# Patient Record
Sex: MALE | Race: BLACK OR AFRICAN AMERICAN | NOT HISPANIC OR LATINO | Employment: OTHER | URBAN - METROPOLITAN AREA
[De-identification: names, ages, dates, MRNs, and addresses within clinical notes are randomized per-mention and may not be internally consistent; named-entity substitution may affect disease eponyms.]

---

## 2019-03-07 ENCOUNTER — OFFICE VISIT (OUTPATIENT)
Dept: FAMILY MEDICINE CLINIC | Facility: CLINIC | Age: 64
End: 2019-03-07

## 2019-03-07 VITALS
OXYGEN SATURATION: 98 % | RESPIRATION RATE: 14 BRPM | HEART RATE: 86 BPM | HEIGHT: 72 IN | WEIGHT: 170 LBS | BODY MASS INDEX: 23.03 KG/M2

## 2019-03-07 DIAGNOSIS — Z12.11 SCREENING FOR COLON CANCER: ICD-10-CM

## 2019-03-07 DIAGNOSIS — R03.0 ELEVATED BLOOD-PRESSURE READING WITHOUT DIAGNOSIS OF HYPERTENSION: Primary | ICD-10-CM

## 2019-03-07 PROBLEM — F17.210 CIGARETTE NICOTINE DEPENDENCE WITHOUT COMPLICATION: Status: ACTIVE | Noted: 2019-03-07

## 2019-03-07 PROCEDURE — 99203 OFFICE O/P NEW LOW 30 MIN: CPT | Performed by: FAMILY MEDICINE

## 2019-03-07 RX ORDER — AMLODIPINE BESYLATE 5 MG/1
5 TABLET ORAL DAILY
Qty: 30 TABLET | Refills: 1 | Status: SHIPPED | OUTPATIENT
Start: 2019-03-07 | End: 2020-10-23

## 2019-03-07 NOTE — PATIENT INSTRUCTIONS
Monitor BP at home  Goal < 130/80- If you remain up, consider medication  Advise decrease smoking and caffeine intake  Bring recent lab results here  You should have colonoscopy  Recheck BP here 2-3 months  If BP remains up ,start RX for amlodipine 5mg daily

## 2019-03-07 NOTE — PROGRESS NOTES
[id number]         NAME: Charla Bamberger is a 58 y o  male  : 1955    MRN: [id number]  DATE: 2019  TIME: 12:28 PM    Assessment and Plan   Elevated blood-pressure reading without diagnosis of hypertension [R03 0]  1  Elevated blood-pressure reading without diagnosis of hypertension  amLODIPine (NORVASC) 5 mg tablet   2  Screening for colon cancer  Ambulatory referral to Gastroenterology       No problem-specific Assessment & Plan notes found for this encounter  Patient Instructions     Patient Instructions   Monitor BP at home  Goal < 130/80- If you remain up, consider medication  Advise decrease smoking and caffeine intake  Bring recent lab results here  You should have colonoscopy  Recheck BP here 2-3 months  If BP remains up ,start RX for amlodipine 5mg daily  Chief Complaint     Chief Complaint   Patient presents with    PT Initial Evaluation     new pt est care - bp readings         History of Present Illness       Pt here to establish care  Had life insurance exam with borderline high blood pressure  Average of 3 readings was 140/90  No h/o hypertension  No current medications  Arthritis of kness and wrists- Aleve 3/day  Review of Systems   Review of Systems   Constitutional: Negative for activity change, appetite change, diaphoresis and fatigue  Respiratory: Negative for cough, chest tightness, shortness of breath and wheezing  Cardiovascular: Negative for chest pain, palpitations and leg swelling  Fast or slow heart rate   Gastrointestinal: Negative for abdominal pain, blood in stool, constipation, diarrhea, nausea and vomiting  Genitourinary: Negative for difficulty urinating, dysuria and frequency  Musculoskeletal: Positive for arthralgias  Negative for gait problem, joint swelling and myalgias  Neurological: Negative for dizziness, light-headedness and headaches     Psychiatric/Behavioral: Negative for agitation, confusion, dysphoric mood and sleep disturbance  The patient is not nervous/anxious  Current Medications       Current Outpatient Medications:     amLODIPine (NORVASC) 5 mg tablet, Take 1 tablet (5 mg total) by mouth daily, Disp: 30 tablet, Rfl: 1    Current Allergies     Allergies as of 03/07/2019    (No Known Allergies)            The following portions of the patient's history were reviewed and updated as appropriate: allergies, current medications, past family history, past medical history, past social history, past surgical history and problem list      Past Medical History:   Diagnosis Date    Arthritis     both knees       Past Surgical History:   Procedure Laterality Date    CARPAL TUNNEL RELEASE  2015    Both wrists       Family History   Problem Relation Age of Onset    Breast cancer Mother     Substance Abuse Neg Hx     Mental illness Neg Hx          Medications have been verified  Objective   Pulse 86   Resp 14   Ht 6' (1 829 m)   Wt 77 1 kg (170 lb)   SpO2 98%   BMI 23 06 kg/m²        Physical Exam     Physical Exam   Constitutional: He is oriented to person, place, and time  He appears well-developed and well-nourished  No distress  HENT:   Head: Normocephalic and atraumatic  Right Ear: Tympanic membrane, external ear and ear canal normal    Left Ear: Tympanic membrane, external ear and ear canal normal    Nose: No mucosal edema or rhinorrhea  Right sinus exhibits no maxillary sinus tenderness  Left sinus exhibits no maxillary sinus tenderness  Mouth/Throat: Uvula is midline  Mucous membranes are not pale and not dry  Normal dentition  No oropharyngeal exudate  Eyes: Pupils are equal, round, and reactive to light  EOM are normal  Right eye exhibits no discharge  Left eye exhibits no discharge  Neck: Normal range of motion  Neck supple  No thyromegaly present  Cardiovascular: Normal rate, regular rhythm, normal heart sounds and intact distal pulses  No murmur heard  Pulmonary/Chest: Effort normal and breath sounds normal  No respiratory distress  He has no wheezes  He has no rales  Abdominal: Soft  He exhibits no mass  There is no tenderness  There is no rebound and no guarding  Musculoskeletal: Normal range of motion  He exhibits no edema or tenderness  Lymphadenopathy:     He has no cervical adenopathy  Neurological: He is alert and oriented to person, place, and time  No cranial nerve deficit  Skin: He is not diaphoretic  Psychiatric: He has a normal mood and affect   His behavior is normal

## 2020-10-23 ENCOUNTER — OFFICE VISIT (OUTPATIENT)
Dept: FAMILY MEDICINE CLINIC | Facility: CLINIC | Age: 65
End: 2020-10-23
Payer: MEDICARE

## 2020-10-23 VITALS
HEIGHT: 71 IN | HEART RATE: 71 BPM | OXYGEN SATURATION: 99 % | TEMPERATURE: 97.8 F | SYSTOLIC BLOOD PRESSURE: 140 MMHG | DIASTOLIC BLOOD PRESSURE: 100 MMHG | WEIGHT: 183.8 LBS | RESPIRATION RATE: 26 BRPM | BODY MASS INDEX: 25.73 KG/M2

## 2020-10-23 DIAGNOSIS — Z23 NEED FOR INFLUENZA VACCINATION: ICD-10-CM

## 2020-10-23 DIAGNOSIS — Z13.6 SCREENING FOR AAA (ABDOMINAL AORTIC ANEURYSM): ICD-10-CM

## 2020-10-23 DIAGNOSIS — Z00.00 WELCOME TO MEDICARE PREVENTIVE VISIT: Primary | ICD-10-CM

## 2020-10-23 DIAGNOSIS — Z13.6 SCREENING FOR CARDIOVASCULAR CONDITION: ICD-10-CM

## 2020-10-23 DIAGNOSIS — I10 ESSENTIAL HYPERTENSION: ICD-10-CM

## 2020-10-23 DIAGNOSIS — Z12.5 ENCOUNTER FOR PROSTATE CANCER SCREENING: ICD-10-CM

## 2020-10-23 DIAGNOSIS — Z12.2 ENCOUNTER FOR SCREENING FOR LUNG CANCER: ICD-10-CM

## 2020-10-23 DIAGNOSIS — Z87.891 PERSONAL HISTORY OF NICOTINE DEPENDENCE: ICD-10-CM

## 2020-10-23 DIAGNOSIS — Z23 NEED FOR PNEUMOCOCCAL VACCINATION: ICD-10-CM

## 2020-10-23 PROCEDURE — G0008 ADMIN INFLUENZA VIRUS VAC: HCPCS

## 2020-10-23 PROCEDURE — 96372 THER/PROPH/DIAG INJ SC/IM: CPT | Performed by: FAMILY MEDICINE

## 2020-10-23 PROCEDURE — 90662 IIV NO PRSV INCREASED AG IM: CPT

## 2020-10-23 PROCEDURE — 90670 PCV13 VACCINE IM: CPT

## 2020-10-23 PROCEDURE — G0438 PPPS, INITIAL VISIT: HCPCS | Performed by: FAMILY MEDICINE

## 2020-10-23 PROCEDURE — G0009 ADMIN PNEUMOCOCCAL VACCINE: HCPCS

## 2020-10-23 RX ORDER — AMLODIPINE BESYLATE 5 MG/1
5 TABLET ORAL DAILY
Qty: 90 TABLET | Refills: 1 | Status: SHIPPED | OUTPATIENT
Start: 2020-10-23 | End: 2021-06-22 | Stop reason: SDUPTHER

## 2020-12-09 LAB
ALBUMIN SERPL-MCNC: 4.1 G/DL (ref 3.6–5.1)
ALBUMIN/GLOB SERPL: 1.5 (CALC) (ref 1–2.5)
ALP SERPL-CCNC: 47 U/L (ref 35–144)
ALT SERPL-CCNC: 10 U/L (ref 9–46)
AST SERPL-CCNC: 17 U/L (ref 10–35)
BILIRUB SERPL-MCNC: 0.6 MG/DL (ref 0.2–1.2)
BUN SERPL-MCNC: 9 MG/DL (ref 7–25)
BUN/CREAT SERPL: NORMAL (CALC) (ref 6–22)
CALCIUM SERPL-MCNC: 9.5 MG/DL (ref 8.6–10.3)
CHLORIDE SERPL-SCNC: 104 MMOL/L (ref 98–110)
CHOLEST SERPL-MCNC: 188 MG/DL
CHOLEST/HDLC SERPL: 4.3 (CALC)
CO2 SERPL-SCNC: 27 MMOL/L (ref 20–32)
CREAT SERPL-MCNC: 1.13 MG/DL (ref 0.7–1.25)
GLOBULIN SER CALC-MCNC: 2.8 G/DL (CALC) (ref 1.9–3.7)
GLUCOSE SERPL-MCNC: 99 MG/DL (ref 65–99)
HDLC SERPL-MCNC: 44 MG/DL
LDLC SERPL CALC-MCNC: 124 MG/DL (CALC)
NONHDLC SERPL-MCNC: 144 MG/DL (CALC)
POTASSIUM SERPL-SCNC: 4.3 MMOL/L (ref 3.5–5.3)
PROT SERPL-MCNC: 6.9 G/DL (ref 6.1–8.1)
PSA SERPL-MCNC: 8 NG/ML
SL AMB EGFR AFRICAN AMERICAN: 78 ML/MIN/1.73M2
SL AMB EGFR NON AFRICAN AMERICAN: 67 ML/MIN/1.73M2
SODIUM SERPL-SCNC: 138 MMOL/L (ref 135–146)
TRIGL SERPL-MCNC: 101 MG/DL

## 2020-12-10 ENCOUNTER — TELEPHONE (OUTPATIENT)
Dept: FAMILY MEDICINE CLINIC | Facility: CLINIC | Age: 65
End: 2020-12-10

## 2021-01-05 ENCOUNTER — TELEPHONE (OUTPATIENT)
Dept: UROLOGY | Facility: MEDICAL CENTER | Age: 66
End: 2021-01-05

## 2021-01-05 NOTE — TELEPHONE ENCOUNTER
Please Triage - [address] Patient- Ref by pcp Dr Karina Kaufman office   [phone]  What is the reason for the patients appointment? Elevated psa 8 0       Imaging/Lab Results: Psa, total screen (12/8/20)      Do we accept the patient's insurance or is the patient Self-Pay?   Provider & Plan: MEDICARE A AND B  Member ID#: [id number]     Has the patient had any previous urologist(s)? no       Have patient records been requested? epic       Has the patient had any outside testing done? epic      Does the patient have a personal history of cancer? no      Patient can be reached at : [phone]

## 2021-02-18 ENCOUNTER — TELEMEDICINE (OUTPATIENT)
Dept: UROLOGY | Facility: HOSPITAL | Age: 66
End: 2021-02-18
Payer: MEDICARE

## 2021-02-18 DIAGNOSIS — Z12.5 ENCOUNTER FOR SCREENING FOR MALIGNANT NEOPLASM OF PROSTATE: ICD-10-CM

## 2021-02-18 DIAGNOSIS — R97.20 ELEVATED PSA: Primary | ICD-10-CM

## 2021-02-18 PROCEDURE — 99213 OFFICE O/P EST LOW 20 MIN: CPT | Performed by: NURSE PRACTITIONER

## 2021-02-18 NOTE — PROGRESS NOTES
Virtual Regular Visit      Assessment/Plan:    Problem List Items Addressed This Visit        Other    Elevated PSA - Primary     We reviewed the results of his recent PSA from 12/8/2020 which was 8 0  No prior PSAs available in Epic to review  We discussed causes for false elevation  Patient believes he engaged in sexual activity before PSA  We will repeat his PSA and have him return to the office to perform digital rectal examination  If PSA remains elevated, we will proceed with prostate biopsy  Relevant Orders    PSA, Total Screen      Other Visit Diagnoses     Encounter for screening for malignant neoplasm of prostate         Relevant Orders    PSA, Total Screen      Follow up in 4 weeks with PSA and YOSHI         Reason for visit is   Chief Complaint   Patient presents with    Virtual Regular Visit        Encounter provider Ubaldo Lambert, [facility]    Provider located at [place] [address] [address] [place] [address]      Recent Visits  No visits were found meeting these conditions  Showing recent visits within past 7 days and meeting all other requirements     Today's Visits  Date Type Provider Dept   [facility]   Showing today's visits and meeting all other requirements     Future Appointments  No visits were found meeting these conditions  Showing future appointments within next 150 days and meeting all other requirements        The patient was identified by name and date of birth  Citlali Moeller was informed that this is a telemedicine visit and that the visit is being conducted through [employer] and patient was informed that this is not a secure, HIPAA-compliant platform  He agrees to proceed     My office door was closed  No one else was in the room  He acknowledged consent and understanding of privacy and security of the video platform   The patient has agreed to participate and understands they can discontinue the visit at any time  Patient is aware this is a billable service  Mely Gonzalez is a 76 y o  male who is being evaluated in consultation for elevated PSA  Patient referred by his PCP  He was recently found to have PSA of 8 0 on annual physical examination in December  Patient denies any prior urologic history, surgical intervention, or instrumentation  He denies any lower urinary tract symptoms, gross hematuria, or dysuria  Patient also denies any strong family history of prostate cancer  HPI     Past Medical History:   Diagnosis Date    Arthritis     both knees       Past Surgical History:   Procedure Laterality Date    CARPAL TUNNEL RELEASE  2015    Both wrists    TONSILLECTOMY         Current Outpatient Medications   Medication Sig Dispense Refill    amLODIPine (NORVASC) 5 mg tablet Take 1 tablet (5 mg total) by mouth daily 90 tablet 1     No current facility-administered medications for this visit  No Known Allergies    Review of Systems   Constitutional: Negative  HENT: Negative  Respiratory: Negative  Cardiovascular: Negative  Gastrointestinal: Negative  Genitourinary: Negative for decreased urine volume, difficulty urinating, dysuria, flank pain, frequency, hematuria and urgency  Musculoskeletal: Negative  Skin: Negative  Neurological: Negative  Psychiatric/Behavioral: Negative  Video Exam    There were no vitals filed for this visit  Physical Exam  Constitutional:       Appearance: Normal appearance  Neurological:      General: No focal deficit present  Mental Status: He is alert and oriented to person, place, and time  Psychiatric:         Mood and Affect: Mood normal          Behavior: Behavior normal          Thought Content:  Thought content normal          Judgment: Judgment normal           I spent 15 minutes with patient today in which greater than 50% of the time was spent in counseling/coordination of care regarding plan of care      Sarita 33 acknowledges that he has consented to an online visit or consultation  He understands that the online visit is based solely on information provided by him, and that, in the absence of a face-to-face physical evaluation by the physician, the diagnosis he receives is both limited and provisional in terms of accuracy and completeness  This is not intended to replace a full medical face-to-face evaluation by the physician  THE [facility] understands and accepts these terms

## 2021-02-18 NOTE — ASSESSMENT & PLAN NOTE
We reviewed the results of his recent PSA from 12/8/2020 which was 8 0  No prior PSAs available in Epic to review  We discussed causes for false elevation  Patient believes he engaged in sexual activity before PSA  We will repeat his PSA and have him return to the office to perform digital rectal examination  If PSA remains elevated, we will proceed with prostate biopsy

## 2021-03-01 ENCOUNTER — TELEPHONE (OUTPATIENT)
Dept: UROLOGY | Facility: AMBULATORY SURGERY CENTER | Age: 66
End: 2021-03-01

## 2021-03-01 DIAGNOSIS — R97.20 ELEVATED PSA: Primary | ICD-10-CM

## 2021-03-01 NOTE — TELEPHONE ENCOUNTER
WORKING ON WAITLIST, WHEN PT CALLS BACK ISAÍAS Hager 3-4 weeks follow up with PSA PTV   1ST ATTEMPT TO CONTACT PT ON 03/01/21 @ 2:01P OLLIE TO CALL THE OFFICE TO SCHED AN APPT

## 2021-03-04 DIAGNOSIS — Z23 ENCOUNTER FOR IMMUNIZATION: ICD-10-CM

## 2021-03-08 NOTE — TELEPHONE ENCOUNTER
Patient's wife called to say he went to [facility] for labs but did not have script   Faxed script to [phone]  One he gets his psa will call back

## 2021-03-13 LAB — PSA SERPL-MCNC: 10.7 NG/ML

## 2021-03-15 ENCOUNTER — TELEPHONE (OUTPATIENT)
Dept: UROLOGY | Facility: AMBULATORY SURGERY CENTER | Age: 66
End: 2021-03-15

## 2021-03-15 RX ORDER — CIPROFLOXACIN 500 MG/1
500 TABLET, FILM COATED ORAL EVERY 12 HOURS SCHEDULED
Qty: 2 TABLET | Refills: 0 | Status: SHIPPED | OUTPATIENT
Start: 2021-03-15 | End: 2021-03-16

## 2021-03-15 NOTE — TELEPHONE ENCOUNTER
[facility] and his wife Laurence Stephenson the a prostate biopsy is needed  Schedule him with Dr Stella Wright in [place] for biopsy and Robert franks for follow up  Reivewed the prostate biopsy with them and mailed information along with appointment cards   Once they receive information - they will call if they have questions

## 2021-03-15 NOTE — TELEPHONE ENCOUNTER
Results of recent PSA remain elevated  His PSA is now 10  Patient needs to be scheduled for next available prostate biopsy  Please review biopsy preparation instructions  Prescription for Cipro was electronically sent to his pharmacy

## 2021-03-25 ENCOUNTER — IMMUNIZATIONS (OUTPATIENT)
Dept: FAMILY MEDICINE CLINIC | Facility: HOSPITAL | Age: 66
End: 2021-03-25

## 2021-03-25 DIAGNOSIS — Z23 ENCOUNTER FOR IMMUNIZATION: Primary | ICD-10-CM

## 2021-03-25 PROCEDURE — 91300 SARS-COV-2 / COVID-19 MRNA VACCINE (PFIZER-BIONTECH) 30 MCG: CPT

## 2021-03-25 PROCEDURE — 0001A SARS-COV-2 / COVID-19 MRNA VACCINE (PFIZER-BIONTECH) 30 MCG: CPT

## 2021-04-15 ENCOUNTER — IMMUNIZATIONS (OUTPATIENT)
Dept: FAMILY MEDICINE CLINIC | Facility: HOSPITAL | Age: 66
End: 2021-04-15

## 2021-04-15 DIAGNOSIS — Z23 ENCOUNTER FOR IMMUNIZATION: Primary | ICD-10-CM

## 2021-04-15 PROCEDURE — 91300 SARS-COV-2 / COVID-19 MRNA VACCINE (PFIZER-BIONTECH) 30 MCG: CPT

## 2021-04-15 PROCEDURE — 0002A SARS-COV-2 / COVID-19 MRNA VACCINE (PFIZER-BIONTECH) 30 MCG: CPT

## 2021-04-21 ENCOUNTER — TELEPHONE (OUTPATIENT)
Dept: UROLOGY | Facility: MEDICAL CENTER | Age: 66
End: 2021-04-21

## 2021-04-21 DIAGNOSIS — R97.20 ELEVATED PSA: Primary | ICD-10-CM

## 2021-04-21 RX ORDER — CIPROFLOXACIN 750 MG/1
750 TABLET, FILM COATED ORAL EVERY 12 HOURS SCHEDULED
Qty: 2 TABLET | Refills: 0 | Status: SHIPPED | OUTPATIENT
Start: 2021-04-21 | End: 2021-04-22

## 2021-04-21 NOTE — TELEPHONE ENCOUNTER
Patient of Dr Carolyne Velasco    Patient wife calling to get antibiotics called into pharmacy before procedure on 04/28

## 2021-04-28 ENCOUNTER — PROCEDURE VISIT (OUTPATIENT)
Dept: UROLOGY | Facility: MEDICAL CENTER | Age: 66
End: 2021-04-28
Payer: MEDICARE

## 2021-04-28 VITALS
HEIGHT: 72 IN | BODY MASS INDEX: 24.24 KG/M2 | SYSTOLIC BLOOD PRESSURE: 126 MMHG | WEIGHT: 179 LBS | DIASTOLIC BLOOD PRESSURE: 96 MMHG

## 2021-04-28 DIAGNOSIS — R97.20 ELEVATED PSA: ICD-10-CM

## 2021-04-28 DIAGNOSIS — Z12.11 ENCOUNTER FOR SCREENING COLONOSCOPY: Primary | ICD-10-CM

## 2021-04-28 PROCEDURE — 96372 THER/PROPH/DIAG INJ SC/IM: CPT | Performed by: UROLOGY

## 2021-04-28 PROCEDURE — G0416 PROSTATE BIOPSY, ANY MTHD: HCPCS | Performed by: PATHOLOGY

## 2021-04-28 PROCEDURE — 88344 IMHCHEM/IMCYTCHM EA MLT ANTB: CPT | Performed by: PATHOLOGY

## 2021-04-28 PROCEDURE — 76942 ECHO GUIDE FOR BIOPSY: CPT | Performed by: UROLOGY

## 2021-04-28 PROCEDURE — 55700 PR BIOPSY OF PROSTATE,NEEDLE/PUNCH: CPT | Performed by: UROLOGY

## 2021-04-28 RX ORDER — CEFTRIAXONE 1 G/1
1000 INJECTION, POWDER, FOR SOLUTION INTRAMUSCULAR; INTRAVENOUS ONCE
Status: COMPLETED | OUTPATIENT
Start: 2021-04-28 | End: 2021-04-28

## 2021-04-28 RX ADMIN — CEFTRIAXONE 1000 MG: 1 INJECTION, POWDER, FOR SOLUTION INTRAMUSCULAR; INTRAVENOUS at 13:57

## 2021-04-28 NOTE — PROGRESS NOTES
Biopsy prostate     Date/Time 4/28/2021 4:44 PM     Performed by  Michaela Peña MD     Authorized by Michaela Peña MD      Procedure Details   Procedure Notes:   Preop diagnosis:  Elevated PSA    Prostate YOSHI abnormality       Postop diagnosis same    Procedure:  Ultrasound-guided transrectal needle biopsy prostate       The patient had undergone preoperative preparation with Fleet enema and antibiotics as described  Informed consent had been obtained  He was positioned in left lateral decubitus position  Digital exam showed moderate induration right lateral lobe at the edge, 1 cm  The ultrasound probe was gently inserted in the rectum  Ultrasound images of the prostate were obtained in two different planes  1% xylocaine, 5 mL on each side, was used to anesthetize the nerves on each side of the prostate  Prostate volume was measured at  51 mL  Careful examination for any abnormalities showed non  Twelve cores of tissue were taken with the 18 gauge biopsy needle, in the appropriate zones of the prostate  Minimal bleeding was encountered upon removal of the probe  No significant bleeding from the urethra was seen  After appropriate observation, he was accompanied to the bathroom, having tolerated the procedure well

## 2021-05-05 ENCOUNTER — TELEPHONE (OUTPATIENT)
Dept: UROLOGY | Facility: MEDICAL CENTER | Age: 66
End: 2021-05-05

## 2021-05-05 DIAGNOSIS — C61 PROSTATE CANCER (HCC): Primary | ICD-10-CM

## 2021-05-05 NOTE — TELEPHONE ENCOUNTER
LMOM for patient to call back  Patient needs to be scheduled for NM bone scan/CT abd/pelvis  BMP ordered

## 2021-05-05 NOTE — PROGRESS NOTES
I discussed results of prostate biopsy showing cancer, [place] seven in numerous cores on right and left side        Will order CT and bone scan, then office visit to discuss option for treatment

## 2021-05-06 ENCOUNTER — TELEPHONE (OUTPATIENT)
Dept: UROLOGY | Facility: MEDICAL CENTER | Age: 66
End: 2021-05-06

## 2021-05-06 NOTE — TELEPHONE ENCOUNTER
Called pt re scheduling CT and bone scan  Left message to call us back re scheduling the testing  Called his cell phone and left message

## 2021-05-10 ENCOUNTER — TELEPHONE (OUTPATIENT)
Dept: UROLOGY | Facility: MEDICAL CENTER | Age: 66
End: 2021-05-10

## 2021-05-10 NOTE — TELEPHONE ENCOUNTER
Called and left message on patient's home phone [phone]  Left message for patient to call to schedule his tests, gave [phone] Central Scheduling #, or to call here if he has any questions

## 2021-05-12 ENCOUNTER — HOSPITAL ENCOUNTER (OUTPATIENT)
Dept: NUCLEAR MEDICINE | Facility: HOSPITAL | Age: 66
Discharge: HOME | End: 2021-05-12
Attending: UROLOGY
Payer: MEDICARE

## 2021-05-12 ENCOUNTER — LAB (OUTPATIENT)
Dept: LAB | Facility: HOSPITAL | Age: 66
End: 2021-05-12
Attending: UROLOGY
Payer: MEDICARE

## 2021-05-12 DIAGNOSIS — C61 PROSTATE CANCER (HCC): ICD-10-CM

## 2021-05-12 LAB
ANION GAP SERPL CALCULATED.3IONS-SCNC: 10 MMOL/L (ref 4–13)
BUN SERPL-MCNC: 9 MG/DL (ref 5–25)
CALCIUM SERPL-MCNC: 9.4 MG/DL (ref 8.3–10.1)
CHLORIDE SERPL-SCNC: 108 MMOL/L (ref 100–108)
CO2 SERPL-SCNC: 23 MMOL/L (ref 21–32)
CREAT SERPL-MCNC: 1.05 MG/DL (ref 0.6–1.3)
GFR SERPL CREATININE-BSD FRML MDRD: 85 ML/MIN/1.73SQ M
GLUCOSE P FAST SERPL-MCNC: 108 MG/DL (ref 65–99)
POTASSIUM SERPL-SCNC: 3.6 MMOL/L (ref 3.5–5.3)
SODIUM SERPL-SCNC: 141 MMOL/L (ref 136–145)

## 2021-05-12 PROCEDURE — 80048 BASIC METABOLIC PNL TOTAL CA: CPT

## 2021-05-12 PROCEDURE — 78306 BONE IMAGING WHOLE BODY: CPT

## 2021-05-12 PROCEDURE — 36415 COLL VENOUS BLD VENIPUNCTURE: CPT

## 2021-05-12 PROCEDURE — A9503 TC99M MEDRONATE: HCPCS

## 2021-05-12 PROCEDURE — G1004 CDSM NDSC: HCPCS

## 2021-05-14 ENCOUNTER — HOSPITAL ENCOUNTER (OUTPATIENT)
Dept: CT IMAGING | Facility: HOSPITAL | Age: 66
Discharge: HOME | End: 2021-05-14
Attending: UROLOGY
Payer: MEDICARE

## 2021-05-14 DIAGNOSIS — C61 PROSTATE CANCER (HCC): ICD-10-CM

## 2021-05-14 PROCEDURE — G1004 CDSM NDSC: HCPCS

## 2021-05-14 PROCEDURE — 74177 CT ABD & PELVIS W/CONTRAST: CPT

## 2021-05-14 RX ADMIN — IOHEXOL 100 ML: 350 INJECTION, SOLUTION INTRAVENOUS at 19:23

## 2021-05-20 ENCOUNTER — TELEPHONE (OUTPATIENT)
Dept: UROLOGY | Facility: MEDICAL CENTER | Age: 66
End: 2021-05-20

## 2021-05-21 ENCOUNTER — OFFICE VISIT (OUTPATIENT)
Dept: UROLOGY | Facility: HOSPITAL | Age: 66
End: 2021-05-21
Payer: MEDICARE

## 2021-05-21 VITALS
SYSTOLIC BLOOD PRESSURE: 112 MMHG | HEART RATE: 106 BPM | BODY MASS INDEX: 24.24 KG/M2 | WEIGHT: 179 LBS | DIASTOLIC BLOOD PRESSURE: 72 MMHG | HEIGHT: 72 IN

## 2021-05-21 DIAGNOSIS — C61 PROSTATE CANCER (HCC): Primary | ICD-10-CM

## 2021-05-21 PROCEDURE — 99214 OFFICE O/P EST MOD 30 MIN: CPT | Performed by: UROLOGY

## 2021-05-21 NOTE — PATIENT INSTRUCTIONS
Call Radiation Oncology [phone]    Ask for consult with Dr Figueroa Severino new diagnosis prostate cancer

## 2021-05-21 NOTE — PROGRESS NOTES
HISTORY:    Recent diagnosis prostate cancer, Rachel score seven in several cores  Summer or 4+3, others were 3+4  Bone scan and CT negative  Minimal BPH symptoms         ASSESSMENT / PLAN:    Long talk regarding options    Carotid radical prostatectomy, robotic technique, catheter and potential complications incontinence impotence    Radiation therapy, length of treatment, potential complications, extensive set up and lead up procedures to the radiation  Patient will likely need hormone therapy based on his pathology    After much discussion, patient says he wants radiation  He does not want any risk of injury to erections, I told him the rad has a lower, but still definite possibility of injury to nerve for erection  Consult arranged    The following portions of the patient's history were reviewed and updated as appropriate: allergies, current medications, past family history, past medical history, past social history, past surgical history and problem list     Review of Systems   All other systems reviewed and are negative  Objective:     Physical Exam  Constitutional:       General: He is not in acute distress  Appearance: He is well-developed  He is not diaphoretic  HENT:      Head: Normocephalic and atraumatic  Eyes:      General: No scleral icterus  Pulmonary:      Effort: Pulmonary effort is normal    Skin:     Coloration: Skin is not pale  Neurological:      Mental Status: He is alert and oriented to person, place, and time  Psychiatric:         Behavior: Behavior normal          Thought Content:  Thought content normal          Judgment: Judgment normal            0   Lab Value Date/Time    PSA 10 7 (H) 03/12/2021 0851    PSA 8 0 (H) 12/08/2020 0905   ]  BUN   Date Value Ref Range Status   05/12/2021 9 5 - 25 mg/dL Final   12/08/2020 9 7 - 25 mg/dL Final     Creatinine   Date Value Ref Range Status   05/12/2021 1 05 0 60 - 1 30 mg/dL Final     Comment:     Standardized to IDMS reference method   05/14/2014 0 94 0 60 - 1 30 mg/dL Final     Comment:     Standardized to IDMS reference method     No components found for: CBC      Patient Active Problem List   Diagnosis    Essential hypertension    Cigarette nicotine dependence without complication    Elevated PSA    Prostate cancer ([facility] [address] )        Diagnoses and all orders for this visit:    Prostate cancer [facility])  -     Ambulatory referral to Radiation Oncology; Future           Patient ID: Moni Munguia is a 76 y o  male        Current Outpatient Medications:     amLODIPine (NORVASC) 5 mg tablet, Take 1 tablet (5 mg total) by mouth daily, Disp: 90 tablet, Rfl: 1    Past Medical History:   Diagnosis Date    Arthritis     both knees       Past Surgical History:   Procedure Laterality Date    CARPAL TUNNEL RELEASE  2015    Both wrists    TONSILLECTOMY         Social History

## 2021-06-03 ENCOUNTER — RADIATION ONCOLOGY CONSULT (OUTPATIENT)
Dept: RADIATION ONCOLOGY | Facility: CLINIC | Age: 66
End: 2021-06-03
Attending: RADIOLOGY
Payer: MEDICARE

## 2021-06-03 ENCOUNTER — TELEPHONE (OUTPATIENT)
Dept: RADIATION ONCOLOGY | Facility: CLINIC | Age: 66
End: 2021-06-03

## 2021-06-03 ENCOUNTER — CLINICAL SUPPORT (OUTPATIENT)
Dept: RADIATION ONCOLOGY | Facility: CLINIC | Age: 66
End: 2021-06-03
Attending: RADIOLOGY

## 2021-06-03 VITALS
HEIGHT: 72 IN | BODY MASS INDEX: 23.83 KG/M2 | WEIGHT: 175.93 LBS | OXYGEN SATURATION: 98 % | DIASTOLIC BLOOD PRESSURE: 90 MMHG | RESPIRATION RATE: 18 BRPM | HEART RATE: 96 BPM | TEMPERATURE: 97.4 F | SYSTOLIC BLOOD PRESSURE: 114 MMHG

## 2021-06-03 DIAGNOSIS — C61 PROSTATE CANCER (HCC): Primary | ICD-10-CM

## 2021-06-03 PROCEDURE — 99211 OFF/OP EST MAY X REQ PHY/QHP: CPT | Performed by: RADIOLOGY

## 2021-06-03 NOTE — PROGRESS NOTES
Radha Foley 1955 is a 76 y o  male with newly diagnosed Rachel 4+3=7 prostate cancer  He presents today for radiation oncology consult  PSA's  3/12/21   10 7  12/8/20   8 0    2/18/21 Christina Morgan NP virtual visit- seen in consult for elevated PSA of 8  Denies lower urinary tract symptoms  He believes he had sexual activity before PSA  Repeat PSA and have him return to office for YOSHI    4/28/21 prostate biopsy    5/12/21 bone scan- 1  Heterogeneous activity in the lumbar spine may be degenerative  Correlation with radiographs recommended  2   Otherwise no scintigraphic evidence of osseous metastasis  5/14/21 CT abdomen pelvis- No evidence of metastatic disease throughout the lung bases, abdomen or pelvis  5/21/21 Dr Akosua Olmos- [place] 7 in several cores, some 4+3 others 3+4  Imaging negative  Minimal BPH symptoms  Discussed surgery and radiation  Patient will likely need hormone therapy based on his pathology  He says he wants radiation  He does not want any risk of injury to erections, I told him radiation has a lower, but still definite possibility of injury to nerve for erection  Oncology History   Prostate cancer ([facility] [address] )   4/28/2021 Initial Diagnosis    Prostate cancer ([facility] [address] )     4/28/2021 Biopsy    Final Diagnosis   A  Prostate, right lateral base:  - Prostatic adenocarcinoma, [place] score 3 + 4 = 7, Prognostic Grade Group 2, discontinuously involving 7% of one core:  - see Note  * tumor is 10% grade 4   * periprostatic fat invasion:  not identified  * lymph-vascular invasion:  not identified  * perineural invasion:  not identified   - Additional pathologic findings:  N/A     B  Prostate, right lateral mid:  - Atypical small acinar proliferation in a single focus, < 5% of one core, suspicious for low grade prostatic adenocarcinoma - see Note       C  Prostate, right lateral apex x 2:  - Prostatic adenocarcinoma, [place] score 4 + 3 = 7, Prognostic Grade Group 3, discontinuously involving 23% of one of two cores:  - see Note  * tumor is 60% grade 4, with focal cribriform morphology   * periprostatic fat invasion:  not identified  * lymph-vascular invasion:  not identified  * perineural invasion:  not identified   - Additional pathologic findings:  N/A     D  Prostate, left lateral base:  - Prostatic adenocarcinoma, Rachel score 4 + 3 = 7, Prognostic Grade Group 3, discontinuously involving 35% of one core:  - see Note  * tumor is 70% grade 4, with focal cribriform morphology   * periprostatic fat invasion:  not identified  * lymph-vascular invasion:  not identified  * perineural invasion:  not identified   - Additional pathologic findings:  N/A     Note: Block D1 is suggested if additional studies are indicated (at least 0 5 mm of tumor for Prolaris)      E  Prostate, left lateral mid:  - Atypical small acinar proliferation in a single focus, < 5% of one core, suspicious for low grade prostatic adenocarcinoma - see Note  F  Prostate, left lateral apex:  - Benign prostate tissue  G  Prostate, left base:  - Prostatic adenocarcinoma, [place] score 3 + 4 = 7, Prognostic Grade Group 2, continuously involving 5% of one core:   * tumor is 20% grade 4   * periprostatic fat invasion:  not identified  * lymph-vascular invasion:  not identified  * perineural invasion:  focally present  - Additional pathologic findings:  N/A     H  Prostate, left mid:  - High grade prostatic intraepithelial neoplasia - see Note        I  Prostate, left apex:  - Benign prostate tissue  J  Prostate, right base:  - Benign prostate tissue  K  Prostate, right mid:  - Prostatic adenocarcinoma, Rachel score 4 + 3 = 7, Prognostic Grade Group 3, continuously involving 19% of one core:   * tumor is 90% grade 4 and displays focal cribriform morphology   * periprostatic fat invasion:  not identified  * lymph-vascular invasion:  not identified  * perineural invasion:  not identified    - Additional pathologic findings:  N/A     L  Prostate, right apex:  - Prostatic adenocarcinoma, [place] score 3 + 4 = 7, Prognostic Grade Group 2, discontinuously involving 19% of one core - see Note  * tumor is 40% grade 4  * periprostatic fat invasion:  not identified  * lymph-vascular invasion:  not identified     * perineural invasion:  focally present  - Additional pathologic findings:  N/A            Clinical Trial: no      Health Maintenance   Topic Date Due    Hepatitis C Screening  Never done    DTaP,Tdap,and Td Vaccines (1 - Tdap) Never done    Lung Cancer Screening  Never done    Colorectal Cancer Screening  Never done    Depression Screening PHQ  10/23/2021    Fall Risk  10/23/2021    Medicare Annual Wellness Visit (AWV)  10/23/2021    Pneumococcal Vaccine: 65+ Years (2 of 2 - PPSV23) 10/23/2021    BMI: Adult  05/21/2022    Influenza Vaccine  Completed    COVID-19 Vaccine  Completed    HIB Vaccine  Aged Out    Hepatitis B Vaccine  Aged Out    IPV Vaccine  Aged Out    Hepatitis A Vaccine  Aged Out    Meningococcal ACWY Vaccine  Aged Out    HPV Vaccine  Aged Out       Past Medical History:   Diagnosis Date    Arthritis     both knees       Past Surgical History:   Procedure Laterality Date    CARPAL TUNNEL RELEASE  2015    Both wrists    TONSILLECTOMY         Family History   Problem Relation Age of Onset    Breast cancer Mother     Substance Abuse Neg Hx     Mental illness Neg Hx        Social History     Tobacco Use    Smoking status: Current Every Day Smoker     Packs/day: 1 00     Years: 40 00     Pack years: 40 00     Types: Cigarettes    Smokeless tobacco: Never Used   Substance Use Topics    Alcohol use: Yes     Frequency: Monthly or less     Drinks per session: 3 or 4     Binge frequency: Never    Drug use: Never          Current Outpatient Medications:     amLODIPine (NORVASC) 5 mg tablet, Take 1 tablet (5 mg total) by mouth daily, Disp: 90 tablet, Rfl: 1    No Known Allergies     Review of Systems:  Review of Systems   Constitutional: Negative  HENT: Negative  Eyes: Negative  Respiratory: Negative  Cardiovascular: Negative  Gastrointestinal: Negative  Endocrine: Negative  Genitourinary: Negative  Negative for dysuria  Nocturia x 1, reports stream is strong, occasional hesitancy   Musculoskeletal: Positive for arthralgias (knees, wrists)  Skin: Negative  Allergic/Immunologic: Negative  Neurological: Positive for numbness (hands in awhile)  Hematological: Negative  Psychiatric/Behavioral: Negative  Vitals:    06/03/21 0935   BP: 114/90   Pulse: 96   Resp: 18   Temp: (!) 97 4 °F (36 3 °C)   SpO2: 98%   Weight: 79 8 kg (175 lb 14 8 oz)   Height: 6' (1 829 m)       Pain Score: 0-No pain    IPSS Questionnaire (AUA-7): Over the past month    1)  How often have you had a sensation of not emptying your bladder completely after you finish urinating? 0 - Not at all   2)  How often have you had to urinate again less than two hours after you finished urinating? 1 - Less than 1 time in 5   3)  How often have you found you stopped and started again several times when you urinated? 0 - Not at all   4) How difficult have you found it to postpone urination? 0 - Not at all   5) How often have you had a weak urinary stream?  0 - Not at all   6) How often have you had to push or strain to begin urination? 0 - Not at all   7) How many times did you most typically get up to urinate from the time you went to bed until the time you got up in the morning? 1 - 1 time   Total Score:  2       Pain assessment: 0    Imaging:No images are attached to the encounter       Teaching NCI RT packet given    Implantable Devices [facility], pacemaker, pain stimulator) no    Hip Replacement no

## 2021-06-03 NOTE — PROGRESS NOTES
Consultation - Radiation Oncology     QMK:[id number] : 1955  Encounter: [id number]  Patient Information: [address]  Chief Complaint   Patient presents with    Consult     Cancer Staging  Prostate cancer [facility])  Staging form: Prostate, AJCC 8th Edition  - Clinical stage from 6/3/2021: Stage IIC (cT2a, cN0, cM0, PSA: 10 7, Grade Group: 3) - Signed by Martin Servin MD on 6/3/2021  Histopathologic type: Adenocarcinoma, NOS  Prostate specific antigen (PSA) range: 10 to 19  Rachel primary pattern: 4  Rachel secondary pattern: 3  Rachel score: 7  Histologic grading system: 5 grade system  Location of positive needle core biopsies: Both sides  Stage used in treatment planning: Yes  National guidelines used in treatment planning: Yes           History of Present Illness   Samuel Colbert is a 76y o  year old male who presents with newly diagnosed [place] 4+3=7 prostate cancer  He presents today for radiation oncology consult      PSA's  3/12/21   10 7  20   8 0     21 Tracie Saleh NP virtual visit- seen in consult for elevated PSA of 8  Denies lower urinary tract symptoms  He believes he had sexual activity before PSA  Repeat PSA and have him return to office for YOSHI     21 prostate biopsy - first biopsy - He had right lateral lobe 1cm induration at biopsy  There were 6/12 biopsy cores positive with 4 on the right side and 2 on the left side 3 of which were [place] score 4+3=7      21 bone scan- 1   Heterogeneous activity in the lumbar spine may be degenerative   Correlation with radiographs recommended  2   Otherwise no scintigraphic evidence of osseous metastasis      21 CT abdomen pelvis- No evidence of metastatic disease throughout the lung bases, abdomen or pelvis      21 Dr Tray Ruiz- Rachel 7 in several cores, some 4+3 others 3+4  Imaging negative  Minimal BPH symptoms  Discussed surgery and radiation   Patient will likely need hormone therapy based on his pathology  He says he wants radiation  He does not want any risk of injury to erections, I told him radiation has a lower, but still definite possibility of injury to nerve for erection  Patient seen for consultation today with his wife  He reports a good urinary stream   He sometimes has nocturia x1 but often none  He can obtain erections and is sexually active  He denies any previous history of any cancer including no skin cancers  Denies any radiation therapy nor any chemotherapy  He has been smoking 1 pack per day for 40 years  His wife does not smoke  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 [facility]  He has 2 sons ages 43 and 40 that are healthy  His father  of myocardial infarction at the age of 61 a and had no history of prostate cancer  Mother  of breast cancer at the age of 46  He has no brothers and no sisters  He and his wife have received COVID vaccination  Historical Information   Oncology History   Prostate cancer ([facility] )   2021 Initial Diagnosis    Prostate cancer ([facility] )     2021 Biopsy    Final Diagnosis   A  Prostate, right lateral base:  - Prostatic adenocarcinoma, [place] score 3 + 4 = 7, Prognostic Grade Group 2, discontinuously involving 7% of one core:  - see Note  * tumor is 10% grade 4   * periprostatic fat invasion:  not identified  * lymph-vascular invasion:  not identified  * perineural invasion:  not identified   - Additional pathologic findings:  N/A     B  Prostate, right lateral mid:  - Atypical small acinar proliferation in a single focus, < 5% of one core, suspicious for low grade prostatic adenocarcinoma - see Note  C  Prostate, right lateral apex x 2:  - Prostatic adenocarcinoma, Rachel score 4 + 3 = 7, Prognostic Grade Group 3, discontinuously involving 23% of one of two cores:  - see Note  * tumor is 60% grade 4, with focal cribriform morphology   * periprostatic fat invasion:  not identified     * lymph-vascular invasion:  not identified  * perineural invasion:  not identified   - Additional pathologic findings:  N/A     D  Prostate, left lateral base:  - Prostatic adenocarcinoma, [place] score 4 + 3 = 7, Prognostic Grade Group 3, discontinuously involving 35% of one core:  - see Note  * tumor is 70% grade 4, with focal cribriform morphology   * periprostatic fat invasion:  not identified  * lymph-vascular invasion:  not identified  * perineural invasion:  not identified   - Additional pathologic findings:  N/A     Note: Block D1 is suggested if additional studies are indicated (at least 0 5 mm of tumor for Prolaris)      E  Prostate, left lateral mid:  - Atypical small acinar proliferation in a single focus, < 5% of one core, suspicious for low grade prostatic adenocarcinoma - see Note  F  Prostate, left lateral apex:  - Benign prostate tissue  G  Prostate, left base:  - Prostatic adenocarcinoma, Rachel score 3 + 4 = 7, Prognostic Grade Group 2, continuously involving 5% of one core:   * tumor is 20% grade 4   * periprostatic fat invasion:  not identified  * lymph-vascular invasion:  not identified  * perineural invasion:  focally present  - Additional pathologic findings:  N/A     H  Prostate, left mid:  - High grade prostatic intraepithelial neoplasia - see Note        I  Prostate, left apex:  - Benign prostate tissue  J  Prostate, right base:  - Benign prostate tissue  K  Prostate, right mid:  - Prostatic adenocarcinoma, Rachel score 4 + 3 = 7, Prognostic Grade Group 3, continuously involving 19% of one core:   * tumor is 90% grade 4 and displays focal cribriform morphology   * periprostatic fat invasion:  not identified  * lymph-vascular invasion:  not identified  * perineural invasion:  not identified   - Additional pathologic findings:  N/A     L   Prostate, right apex:  - Prostatic adenocarcinoma, [place] score 3 + 4 = 7, Prognostic Grade Group 2, discontinuously involving 19% of one core - see Note  * tumor is 40% grade 4  * periprostatic fat invasion:  not identified  * lymph-vascular invasion:  not identified  * perineural invasion:  focally present  - Additional pathologic findings:  N/A        6/3/2021 -  Cancer Staged    Staging form: Prostate, AJCC 8th Edition  - Clinical stage from 6/3/2021: Stage IIC (cT2a, cN0, cM0, PSA: 10 7, Grade Group: 3) - Signed by Angi Carmichael MD on 6/3/2021  Histopathologic type: Adenocarcinoma, NOS  Prostate specific antigen (PSA) range: 10 to 19  Keller primary pattern: 4  Keller secondary pattern: 3  Rachel score: 7  Histologic grading system: 5 grade system  Location of positive needle core biopsies: Both sides  Stage used in treatment planning: Yes  National guidelines used in treatment planning: Yes         Past Medical History:   Diagnosis Date    Arthritis     both knees     Past Surgical History:   Procedure Laterality Date    CARPAL TUNNEL RELEASE  2015    Both wrists    TONSILLECTOMY         Family History   Problem Relation Age of Onset    Breast cancer Mother     Substance Abuse Neg Hx     Mental illness Neg Hx      Social History   Social History     Substance and Sexual Activity   Alcohol Use Yes    Frequency: Monthly or less    Drinks per session: 3 or 4    Binge frequency: Never     Social History     Substance and Sexual Activity   Drug Use Never     Social History     Tobacco Use   Smoking Status Current Every Day Smoker    Packs/day: 1 00    Years: 40 00    Pack years: 40 00    Types: Cigarettes   Smokeless Tobacco Never Used     Meds/Allergies     Current Outpatient Medications:     amLODIPine (NORVASC) 5 mg tablet, Take 1 tablet (5 mg total) by mouth daily, Disp: 90 tablet, Rfl: 1  No Known Allergies      Review of Systems   Constitutional: Negative  HENT: Negative  Eyes: Negative  Respiratory: Negative  Cardiovascular: Negative  Gastrointestinal: Negative     Endocrine: Negative  Genitourinary: Negative  Negative for dysuria  Nocturia x 1, reports stream is strong, occasional hesitancy   Musculoskeletal: Positive for arthralgias (knees, wrists)  Skin: Negative  Allergic/Immunologic: Negative  Neurological: Positive for numbness (hands in awhile)  Hematological: Negative  Psychiatric/Behavioral: Negative  IPSS Questionnaire (AUA-7): Over the past month    1) How often have you had a sensation of not emptying your bladder completely after you finish urinating? 0 - Not at all   2) How often have you had to urinate again less than two hours after you finished urinating? 1 - Less than 1 time in 5   3) How often have you found you stopped and started again several times when you urinated? 0 - Not at all   4) How difficult have you found it to postpone urination? 0 - Not at all   5) How often have you had a weak urinary stream?  0 - Not at all   6) How often have you had to push or strain to begin urination? 0 - Not at all   7) How many times did you most typically get up to urinate from the time you went to bed until the time you got up in the morning? 1 - 1 time   Total Score: 2     OBJECTIVE:   /90   Pulse 96   Temp (!) 97 4 °F (36 3 °C)   Resp 18   Ht 6' (1 829 m)   Wt 79 8 kg (175 lb 14 8 oz)   SpO2 98%   BMI 23 86 kg/m²   Pain Assessment:  0  Performance Status: ECOG/Zubrod/WHO: 0 - Asymptomatic    Physical Exam  Vitals and nursing note reviewed  Constitutional:       General: He is not in acute distress  Appearance: He is well-developed  He is not diaphoretic  HENT:      Head: Normocephalic and atraumatic  Mouth/Throat:      Pharynx: No oropharyngeal exudate  Eyes:      General: No scleral icterus  Conjunctiva/sclera: Conjunctivae normal       Pupils: Pupils are equal, round, and reactive to light  Neck:      Thyroid: No thyromegaly  Trachea: No tracheal deviation     Cardiovascular:      Rate and Rhythm: Normal rate and regular rhythm  Heart sounds: Normal heart sounds  Pulmonary:      Effort: Pulmonary effort is normal  No respiratory distress  Breath sounds: Normal breath sounds  No wheezing or rales  Chest:      Chest wall: No tenderness  Abdominal:      General: Bowel sounds are normal  There is no distension  Palpations: Abdomen is soft  There is no mass  Tenderness: There is no abdominal tenderness  Genitourinary:     Comments: Rectal examination deferred  Musculoskeletal:         General: No tenderness  Normal range of motion  Cervical back: Normal range of motion and neck supple  Lymphadenopathy:      Cervical: No cervical adenopathy  Upper Body:      Right upper body: No supraclavicular adenopathy  Left upper body: No supraclavicular adenopathy  Lower Body: No right inguinal adenopathy  No left inguinal adenopathy  Skin:     General: Skin is warm and dry  Coloration: Skin is not pale  Findings: No erythema or rash  Neurological:      General: No focal deficit present  Mental Status: He is alert and oriented to person, place, and time  Cranial Nerves: No cranial nerve deficit  Coordination: Coordination normal    Psychiatric:         Mood and Affect: Mood normal          Behavior: Behavior normal          Thought Content: Thought content normal          Judgment: Judgment normal        RESULTS  Lab Results  Lab Results   Component Value Date    PSA 10 7 (H) 03/12/2021    PSA 8 0 (H) 12/08/2020     Imaging Studies  Nm Bone Scan Whole Body    Result Date: 5/12/2021  Narrative: BONE SCAN  WHOLE BODY INDICATION: C61: Malignant neoplasm of prostate PREVIOUS FILM CORRELATION:    No prior pertinent studies for comparison  TECHNIQUE:   This study was performed following the intravenous administration of 27 3 mCi Tc-99m labeled MDP  Delayed, anterior and posterior whole body images were acquired, 2-3 hours after radiopharmaceutical administration  FINDINGS:  Heterogeneous activity in the lumbar spine may be degenerative  Degenerative changes in the shoulders, wrists, knees  Symmetric renal uptake  No additional lesions that would be concerning for osseous metastases  Impression: 1  Heterogeneous activity in the lumbar spine may be degenerative  Correlation with radiographs recommended  2   Otherwise no scintigraphic evidence of osseous metastasis  Workstation performed: [id number]     Ct Abdomen Pelvis W Contrast    Result Date: 5/17/2021  Narrative: CT ABDOMEN AND PELVIS WITH IV CONTRAST INDICATION:   C61: Malignant neoplasm of prostate  COMPARISON:  None  TECHNIQUE:  CT examination of the abdomen and pelvis was performed  Axial, sagittal, and coronal 2D reformatted images were created from the source data and submitted for interpretation  Radiation dose length product (DLP) for this visit:  427 21 mGy-cm   This examination, like all CT scans performed in the [facility], was performed utilizing techniques to minimize radiation dose exposure, including the use of iterative  reconstruction and automated exposure control  IV Contrast:  100 mL of iohexol (OMNIPAQUE) Enteric Contrast:  Enteric contrast was not administered  FINDINGS: ABDOMEN LOWER CHEST:  No clinically significant abnormality identified in the visualized lower chest  LIVER/BILIARY TREE:  Enlarged fatty liver noted  GALLBLADDER:  No calcified gallstones  No pericholecystic inflammatory change  SPLEEN:  Unremarkable  PANCREAS:  Unremarkable  ADRENAL GLANDS:  Unremarkable  KIDNEYS/URETERS:  Unremarkable  No hydronephrosis  STOMACH AND BOWEL:  Extensive diverticular disease throughout the colon  APPENDIX:  A normal appendix was visualized  ABDOMINOPELVIC CAVITY:  No ascites  No pneumoperitoneum  No lymphadenopathy  VESSELS:  Unremarkable for patient's age  PELVIS REPRODUCTIVE ORGANS:  Unremarkable for patient's age  URINARY BLADDER:  Unremarkable   ABDOMINAL WALL/INGUINAL REGIONS:  Unremarkable  OSSEOUS STRUCTURES:  Degenerative change noted lower lumbar spine  No sclerotic osseous lesions identified  Impression: No evidence of metastatic disease throughout the lung bases, abdomen or pelvis  Workstation performed: [id number]     Pathology: See Above    ASSESSMENT  1  Prostate cancer [facility])  Ambulatory referral to Radiation Oncology    Radiation Simulation Treatment     Cancer Staging  Prostate cancer [facility])  Staging form: Prostate, AJCC 8th Edition  - Clinical stage from 6/3/2021: Stage IIC (cT2a, cN0, cM0, PSA: 10 7, Grade Group: 3) - Signed by Shawn Alberto MD on 6/3/2021  Histopathologic type: Adenocarcinoma, NOS  Prostate specific antigen (PSA) range: 10 to 19  Rachel primary pattern: 4  Rachel secondary pattern: 3  [place] score: 7  Histologic grading system: 5 grade system  Location of positive needle core biopsies: Both sides  Stage used in treatment planning: Yes  National guidelines used in treatment planning: Yes      Risk Category: moderate  Bone Scan: yes  Androgen Deprivation Therapy: no      PLAN/DISCUSSION  Orders Placed This Encounter   Procedures   Dayana Sanford is a 76y o  year old male with a stage IIC [place] score 4+3=7 prostate adenocarcinoma diagnosed in the elevated PSA 10 7 NG/mL  He had his 1st prostate biopsies April 28, 2021 that were positive in 6/12 cores bilaterally for [place] score 4+3=7 disease in 3 cores with the other 3 cores showing 3+4=7 disease  His bone scan was negative for any evidence of metastatic disease and CT scan of the abdomen pelvis was also negative for evidence of metastatic disease  We discussed treatment options to include prostatectomy versus definitive external beam radiation therapy as well as observation  He is not comfortable with observation and also does not wish to pursue surgery    We discussed definitive radiation therapy alone or definitive radiation therapy with concurrent androgen deprivation therapy  He declined the androgen deprivation therapy and wants to be treated with radiation therapy alone  We discussed placement of fiducial markers for image guided radiation therapy as well as the placement of a space-oar to reduce dose to the rectum  We recommend treatment with intensity modulated radiation therapy using RapidArc technology as well as the image guided radiation therapy  This will allow for delivery of curative doses radiation to the prostate while sparing the normal surrounding bladder, rectal, bowel, and hip tissues  We discussed the acute side effects and the potential chronic complications of treatment  He does agree to proceed with the radiation therapy  He will return for simulation treatment planning purposes in the [facility] after placement of fiducial markers and SpaceOAR  Shawn Howard MD  6/3/2021,10:41 AM      Portions of the record may have been created with voice recognition software   Occasional wrong word or "sound a like" substitutions may have occurred due to the inherent limitations of voice recognition software   Read the chart carefully and recognize, using context, where substitutions have occurred

## 2021-06-04 NOTE — TELEPHONE ENCOUNTER
Patient scheduled for 1st available 7/29/2021 at [facility] with Dr Kelly Notice     -instructions given verbally and mailed  -patient aware IV sed, needs  and to be NPO after midnight the night prior  -CBC, CMP, PSA and EKG 10 days prior  -MCR only - no auth required

## 2021-06-17 ENCOUNTER — PATIENT OUTREACH (OUTPATIENT)
Dept: UROLOGY | Facility: AMBULATORY SURGERY CENTER | Age: 66
End: 2021-06-17

## 2021-06-17 DIAGNOSIS — C61 PROSTATE CANCER (HCC): Primary | ICD-10-CM

## 2021-06-17 NOTE — PROGRESS NOTES
Called [facility] to schedule Urology approximately 3 months after completing RT  OV scheduled on 1/17/21  He was instructed to have PSA PTV  [facility] declined ADT

## 2021-06-22 DIAGNOSIS — I10 ESSENTIAL HYPERTENSION: ICD-10-CM

## 2021-06-22 RX ORDER — AMLODIPINE BESYLATE 5 MG/1
5 TABLET ORAL DAILY
Qty: 90 TABLET | Refills: 1 | Status: SHIPPED | OUTPATIENT
Start: 2021-06-22 | End: 2021-06-23 | Stop reason: SDUPTHER

## 2021-06-22 RX ORDER — AMLODIPINE BESYLATE 5 MG/1
5 TABLET ORAL DAILY
Qty: 90 TABLET | Refills: 0 | Status: SHIPPED | OUTPATIENT
Start: 2021-06-22 | End: 2021-06-22 | Stop reason: SDUPTHER

## 2021-06-23 DIAGNOSIS — I10 ESSENTIAL HYPERTENSION: ICD-10-CM

## 2021-06-23 RX ORDER — AMLODIPINE BESYLATE 5 MG/1
5 TABLET ORAL DAILY
Qty: 90 TABLET | Refills: 1 | Status: SHIPPED | OUTPATIENT
Start: 2021-06-23 | End: 2022-02-21

## 2021-06-23 NOTE — TELEPHONE ENCOUNTER
Medication was sent to the wrong pharmacy   amLODIPine (NORVASC) 5 mg tablet    Please send to     [facility] [phone]

## 2021-07-27 NOTE — H&P
HISTORY AND PHYSICAL  ? ? Patient Name: Sandhya Ramos  Patient MRN: [id number]  Attending Provider: Gaby Valentine MD  Service: Urology  Chief Complaint    Prostate cancer    HPI   Sandhya Ramos is a 76 y o  male with prostate cancer  He has elected radiation therapy  I plan insertion of fiducial markers and SpaceOAR  Potential risks and complications discussed, and informed consent was given by the patient  Medications  Meds/Allergies   No current facility-administered medications for this encounter  Prior to Admission Medications   Prescriptions Last Dose Informant Patient Reported? Taking? amLODIPine (NORVASC) 5 mg tablet   No No   Sig: Take 1 tablet (5 mg total) by mouth daily      Facility-Administered Medications: None     No current facility-administered medications for this encounter  Review of Systems  10 point review of systems negative except as noted in HPI  Allergies  No Known Allergies  PMH  Past Medical History:   Diagnosis Date    Arthritis     both knees     Past surgical history  Past Surgical History:   Procedure Laterality Date    CARPAL TUNNEL RELEASE  2015    Both wrists    TONSILLECTOMY       Social history  Social History     Tobacco Use    Smoking status: Current Every Day Smoker     Packs/day: 1 00     Years: 40 00     Pack years: 40 00     Types: Cigarettes    Smokeless tobacco: Never Used   Vaping Use    Vaping Use: Never used   Substance Use Topics    Alcohol use: Yes    Drug use: Never     ? Physical Exam      There were no vitals taken for this visit  General appearance: alert and oriented, in no acute distress  Head: Normocephalic, without obvious abnormality, atraumatic  Neck: supple, symmetrical, trachea midline  Back: symmetric, no curvature  ROM normal  No CVA tenderness    Lungs: Normal effort  Heart: regular rate and rhythm  Abdomen: soft, non-tender; bowel sounds normal; no masses,  no organomegaly  Male genitalia: normal  Extremities: extremities normal, warm and well-perfused; no cyanosis, clubbing, or edema  Neurologic: Grossly normal     Monserrat Weston MD

## 2021-07-28 ENCOUNTER — ANESTHESIA EVENT (OUTPATIENT)
Dept: GASTROENTEROLOGY | Facility: HOSPITAL | Age: 66
End: 2021-07-28
Payer: MEDICARE

## 2021-07-28 ENCOUNTER — TELEPHONE (OUTPATIENT)
Dept: UROLOGY | Facility: MEDICAL CENTER | Age: 66
End: 2021-07-28

## 2021-07-28 NOTE — TELEPHONE ENCOUNTER
Called and left VM     "My name is Gabriel Kearney     I am calling in regrades to your prep instructions for your appointment at the [facility] [address]  on 7/29/21 with Dr Edin Stern  under IV SE please make sure to stop all blood 7 day prior to you appointment  Nothing to eat after midnight the day before the procedure   And please make sure you have a    Pt will need to use an enema with in 2 hour of the appointment  If you have any questions please call [phone] and ask for Nolanmandie Gone "

## 2021-07-29 ENCOUNTER — ANESTHESIA (OUTPATIENT)
Dept: GASTROENTEROLOGY | Facility: HOSPITAL | Age: 66
End: 2021-07-29
Payer: MEDICARE

## 2021-07-29 ENCOUNTER — HOSPITAL ENCOUNTER (OUTPATIENT)
Facility: HOSPITAL | Age: 66
Setting detail: OUTPATIENT SURGERY
Discharge: HOME | End: 2021-07-29
Attending: UROLOGY | Admitting: UROLOGY
Payer: MEDICARE

## 2021-07-29 VITALS
SYSTOLIC BLOOD PRESSURE: 126 MMHG | WEIGHT: 175 LBS | DIASTOLIC BLOOD PRESSURE: 94 MMHG | TEMPERATURE: 97.1 F | OXYGEN SATURATION: 100 % | RESPIRATION RATE: 18 BRPM | HEART RATE: 80 BPM | BODY MASS INDEX: 23.7 KG/M2 | HEIGHT: 72 IN

## 2021-07-29 PROBLEM — M19.90 ARTHRITIS: Status: ACTIVE | Noted: 2021-07-29

## 2021-07-29 PROBLEM — F17.200 SMOKING: Status: ACTIVE | Noted: 2021-07-29

## 2021-07-29 PROBLEM — IMO0001 SMOKING: Status: ACTIVE | Noted: 2021-07-29

## 2021-07-29 PROCEDURE — 55876 PLACE RT DEVICE/MARKER PROS: CPT | Performed by: UROLOGY

## 2021-07-29 PROCEDURE — A4648 IMPLANTABLE TISSUE MARKER: HCPCS | Performed by: UROLOGY

## 2021-07-29 PROCEDURE — NC001 PR NO CHARGE: Performed by: UROLOGY

## 2021-07-29 PROCEDURE — 55874 TPRNL PLMT BIODEGRDABL MATRL: CPT | Performed by: UROLOGY

## 2021-07-29 DEVICE — STERILE PLACEMENT NEEDLES (17GA ETW X 20CM) WITH BONE WAX AND (1.2 X 3MM) SOFT TISSUE GOLD MARKER [3]
Type: IMPLANTABLE DEVICE | Site: PROSTATE | Status: FUNCTIONAL
Brand: FIDUCIAL MARKER KIT

## 2021-07-29 RX ORDER — LIDOCAINE HYDROCHLORIDE 20 MG/ML
INJECTION, SOLUTION EPIDURAL; INFILTRATION; INTRACAUDAL; PERINEURAL AS NEEDED
Status: DISCONTINUED | OUTPATIENT
Start: 2021-07-29 | End: 2021-07-29 | Stop reason: HOSPADM

## 2021-07-29 RX ORDER — ACETAMINOPHEN 325 MG/1
650 TABLET ORAL EVERY 6 HOURS PRN
Status: CANCELLED | OUTPATIENT
Start: 2021-07-29

## 2021-07-29 RX ORDER — KETOROLAC TROMETHAMINE 30 MG/ML
15 INJECTION, SOLUTION INTRAMUSCULAR; INTRAVENOUS EVERY 6 HOURS SCHEDULED
Status: CANCELLED | OUTPATIENT
Start: 2021-07-29 | End: 2021-07-30

## 2021-07-29 RX ORDER — SODIUM CHLORIDE 9 MG/ML
100 INJECTION, SOLUTION INTRAVENOUS CONTINUOUS
Status: DISCONTINUED | OUTPATIENT
Start: 2021-07-29 | End: 2021-07-29 | Stop reason: HOSPADM

## 2021-07-29 RX ORDER — PROPOFOL 10 MG/ML
INJECTION, EMULSION INTRAVENOUS AS NEEDED
Status: DISCONTINUED | OUTPATIENT
Start: 2021-07-29 | End: 2021-07-29

## 2021-07-29 RX ORDER — SODIUM CHLORIDE 9 MG/ML
INJECTION, SOLUTION INTRAVENOUS CONTINUOUS PRN
Status: DISCONTINUED | OUTPATIENT
Start: 2021-07-29 | End: 2021-07-29

## 2021-07-29 RX ORDER — BUPIVACAINE HYDROCHLORIDE 5 MG/ML
INJECTION, SOLUTION EPIDURAL; INTRACAUDAL AS NEEDED
Status: DISCONTINUED | OUTPATIENT
Start: 2021-07-29 | End: 2021-07-29 | Stop reason: HOSPADM

## 2021-07-29 RX ORDER — LIDOCAINE HYDROCHLORIDE 10 MG/ML
INJECTION, SOLUTION EPIDURAL; INFILTRATION; INTRACAUDAL; PERINEURAL AS NEEDED
Status: DISCONTINUED | OUTPATIENT
Start: 2021-07-29 | End: 2021-07-29

## 2021-07-29 RX ORDER — FENTANYL CITRATE 50 UG/ML
INJECTION, SOLUTION INTRAMUSCULAR; INTRAVENOUS AS NEEDED
Status: DISCONTINUED | OUTPATIENT
Start: 2021-07-29 | End: 2021-07-29

## 2021-07-29 RX ORDER — LIDOCAINE HYDROCHLORIDE 10 MG/ML
0.5 INJECTION, SOLUTION EPIDURAL; INFILTRATION; INTRACAUDAL; PERINEURAL ONCE AS NEEDED
Status: DISCONTINUED | OUTPATIENT
Start: 2021-07-29 | End: 2021-07-29 | Stop reason: HOSPADM

## 2021-07-29 RX ADMIN — CEFTRIAXONE SODIUM 1000 MG: 10 INJECTION, POWDER, FOR SOLUTION INTRAVENOUS at 09:04

## 2021-07-29 RX ADMIN — PROPOFOL 100 MG: 10 INJECTION, EMULSION INTRAVENOUS at 09:47

## 2021-07-29 RX ADMIN — PROPOFOL 50 MG: 10 INJECTION, EMULSION INTRAVENOUS at 09:49

## 2021-07-29 RX ADMIN — LIDOCAINE HYDROCHLORIDE 100 MG: 10 INJECTION, SOLUTION EPIDURAL; INFILTRATION; INTRACAUDAL; PERINEURAL at 09:46

## 2021-07-29 RX ADMIN — FENTANYL CITRATE 25 MCG: 50 INJECTION INTRAMUSCULAR; INTRAVENOUS at 09:56

## 2021-07-29 RX ADMIN — SODIUM CHLORIDE: 0.9 INJECTION, SOLUTION INTRAVENOUS at 09:35

## 2021-07-29 RX ADMIN — PROPOFOL 30 MG: 10 INJECTION, EMULSION INTRAVENOUS at 09:59

## 2021-07-29 RX ADMIN — SODIUM CHLORIDE 100 ML/HR: 0.9 INJECTION, SOLUTION INTRAVENOUS at 09:04

## 2021-07-29 RX ADMIN — PROPOFOL 30 MG: 10 INJECTION, EMULSION INTRAVENOUS at 10:03

## 2021-07-29 RX ADMIN — FENTANYL CITRATE 25 MCG: 50 INJECTION INTRAMUSCULAR; INTRAVENOUS at 09:46

## 2021-07-29 RX ADMIN — PROPOFOL 30 MG: 10 INJECTION, EMULSION INTRAVENOUS at 09:56

## 2021-07-29 RX ADMIN — PROPOFOL 20 MG: 10 INJECTION, EMULSION INTRAVENOUS at 09:52

## 2021-07-29 RX ADMIN — PROPOFOL 30 MG: 10 INJECTION, EMULSION INTRAVENOUS at 10:05

## 2021-07-29 RX ADMIN — FENTANYL CITRATE 25 MCG: 50 INJECTION INTRAMUSCULAR; INTRAVENOUS at 09:51

## 2021-07-29 NOTE — OP NOTE
OPERATIVE REPORT  PATIENT NAME: Severo Zaragoza    :  1955  MRN: [id number]  Pt Location: [facility]    SURGERY DATE: 2021    Surgeon(s) and Role:     * Thelma Anguiano MD - Primary    Preop Diagnosis:  Malignant neoplasm of prostate ([facility] ) Dangelo Ann    Post-Op Diagnosis Codes:     * Malignant neoplasm of prostate ([facility] ) [C61]    Procedure(s) (LRB):  INSERTION OF FIDUCIAL MARKER (TRANSRECTAL ULTRASOUND GUIDANCE), SPACEOAR (N/A)    Specimen(s):  * No specimens in log *    Estimated Blood Loss:   Minimal    Drains:  * No LDAs found *    Anesthesia Type:   IV Sedation with Anesthesia    Operative Indications:  Malignant [facility]oplasm [facility] prostate ([facility] ) [C61]      Operative Findings:  Successful placement of fiducial markers and SpaceOAR    Complications:   None    Procedure and Technique:  The patient was brought to the operating room properly identified  Intravenous sedation was administered  He was placed in lithotomy position prepped and draped in usual sterile fashion  Intravenous antibiotic was administered by preoperatively in the holding area  An appropriate time-out was performed  The patient was prepped and draped  The scrotum was taped up to the anterior abdominal wall to allow access to the perineum  Digital rectal examination was then performed  Transrectal ultrasound probe was then placed into the rectum and the prostate visualized  Under ultrasound guidance gold fiducials were then placed into the prostate in the right base, left base and right apex of the gland  SpaceOAR Hydrogel-Frankie was then prepared as described in the manufacture's instructions for use  With the patient maintained  in dorsal lithotomy position, the transrectal ultrasound probe was positioned  to enable visual guidance of the needle into the space between the prostate and the rectum    Under transrectal ultrasound guidance, the 15 cm 18 gauge needle was  inserted through the skin, subcutaneous tissue and rectal urethralis muscle and the needle tip advanced into the perirectal fat inferior to the prostate all by using a transperineal approach and with side fire transrectal ultrasound guidance  The needle position was confirmed in both sagittal and axial fields  Saline was used to dissect the space between Denonvilliers' fascia and the anterior rectal wall  In this way a space was created with hydro dissection  With the needle tip at mid gland, the axial field was used to  confirm the needle was not in the rectal wall  While maintaining  desired position aspiration was done to ensure that the needle was not in a vascular space  The assembled SpaceOAR  delivery system was then attached to the 18 gauge needle  Under ultrasound guidance in the sagittal plane, smooth continuous  injection technique was used to dispense the SpaceOAR  Hydrogel into the space between the prostate and rectum  The entire syringe contents (10 mL) was injected without stopping  Optimal visualization of the needle during Hydrogel administration was maintained at all times  No suspected penetration or compromise of the rectal wall occurred  The patient tolerated procedure well  The needle was removed  The patient was taken out of lithotomy position and awakened from anesthesia and taken to the recovery room in satisfactory condition     I was present for the entire procedure    Patient Disposition:  APU    SIGNATURE: Yamilka Robert MD  DATE: July 29, 2021  TIME: 10:15 AM

## 2021-07-29 NOTE — ANESTHESIA POSTPROCEDURE EVALUATION
Post-Op Assessment Note    CV Status:  Stable  Pain Score: 0    Pain management: adequate     Mental Status:  Awake   Hydration Status:  Euvolemic   PONV Controlled:  None   Airway Patency:  Patent      Post Op Vitals Reviewed: Yes      Staff: Anesthesiologist, CRNA   Comments: report given to RN; VSS; RA        No complications documented      BP   92/64   Temp   97 1   Pulse  72   Resp   20   SpO2   98

## 2021-07-29 NOTE — INTERVAL H&P NOTE
H&P reviewed  After examining the patient I find no changes in the patients condition since the H&P had been written  Vitals:    07/29/21 0902   BP: 139/85   Pulse: 81   Resp: 18   Temp: 98 °F (36 7 °C)   SpO2: 99%   Procedure reviewed with the patient in the holding area  Risks were discussed and consent form signed

## 2021-07-29 NOTE — DISCHARGE INSTRUCTIONS
Rest today and tomorrow  Tylenol and or ibuprofen can be used for pain  An ice pack can be used for pain in the perineum  Call for fever, difficulty voiding or heavy bleeding

## 2021-08-05 ENCOUNTER — APPOINTMENT (OUTPATIENT)
Dept: RADIATION ONCOLOGY | Facility: CLINIC | Age: 66
End: 2021-08-05
Attending: RADIOLOGY
Payer: MEDICARE

## 2021-08-05 PROCEDURE — 77334 RADIATION TREATMENT AID(S): CPT | Performed by: RADIOLOGY

## 2021-08-05 PROCEDURE — 77263 THER RADIOLOGY TX PLNG CPLX: CPT | Performed by: RADIOLOGY

## 2021-08-05 PROCEDURE — 77399 UNLISTED PX MED RADJ PHYSICS: CPT | Performed by: RADIOLOGY

## 2021-08-18 ENCOUNTER — APPOINTMENT (OUTPATIENT)
Dept: RADIATION ONCOLOGY | Facility: CLINIC | Age: 66
End: 2021-08-18
Payer: MEDICARE

## 2021-08-18 PROCEDURE — 77300 RADIATION THERAPY DOSE PLAN: CPT | Performed by: RADIOLOGY

## 2021-08-18 PROCEDURE — 77301 RADIOTHERAPY DOSE PLAN IMRT: CPT | Performed by: RADIOLOGY

## 2021-08-18 PROCEDURE — 77338 DESIGN MLC DEVICE FOR IMRT: CPT | Performed by: RADIOLOGY

## 2021-08-19 ENCOUNTER — APPOINTMENT (OUTPATIENT)
Dept: RADIATION ONCOLOGY | Facility: CLINIC | Age: 66
End: 2021-08-19
Attending: RADIOLOGY
Payer: MEDICARE

## 2021-08-20 ENCOUNTER — APPOINTMENT (OUTPATIENT)
Dept: RADIATION ONCOLOGY | Facility: CLINIC | Age: 66
End: 2021-08-20
Attending: RADIOLOGY
Payer: MEDICARE

## 2021-08-20 PROCEDURE — 77014 CHG CT GUIDANCE PLACEMENT RAD THERAPY FIELDS: CPT | Performed by: INTERNAL MEDICINE

## 2021-08-23 ENCOUNTER — APPOINTMENT (OUTPATIENT)
Dept: RADIATION ONCOLOGY | Facility: CLINIC | Age: 66
End: 2021-08-23
Attending: RADIOLOGY
Payer: MEDICARE

## 2021-08-23 PROCEDURE — 77427 RADIATION TX MANAGEMENT X5: CPT | Performed by: RADIOLOGY

## 2021-08-23 PROCEDURE — 77014 CHG CT GUIDANCE PLACEMENT RAD THERAPY FIELDS: CPT | Performed by: RADIOLOGY

## 2021-08-23 PROCEDURE — 77385 HB NTSTY MODUL RAD TX DLVR SMPL: CPT | Performed by: RADIOLOGY

## 2021-08-24 ENCOUNTER — APPOINTMENT (OUTPATIENT)
Dept: RADIATION ONCOLOGY | Facility: CLINIC | Age: 66
End: 2021-08-24
Attending: RADIOLOGY
Payer: MEDICARE

## 2021-08-24 PROCEDURE — 77385 HB NTSTY MODUL RAD TX DLVR SMPL: CPT | Performed by: INTERNAL MEDICINE

## 2021-08-25 ENCOUNTER — APPOINTMENT (OUTPATIENT)
Dept: RADIATION ONCOLOGY | Facility: CLINIC | Age: 66
End: 2021-08-25
Attending: RADIOLOGY
Payer: MEDICARE

## 2021-08-25 PROCEDURE — 77385 HB NTSTY MODUL RAD TX DLVR SMPL: CPT | Performed by: RADIOLOGY

## 2021-08-25 PROCEDURE — 77014 CHG CT GUIDANCE PLACEMENT RAD THERAPY FIELDS: CPT | Performed by: RADIOLOGY

## 2021-08-26 ENCOUNTER — APPOINTMENT (OUTPATIENT)
Dept: RADIATION ONCOLOGY | Facility: CLINIC | Age: 66
End: 2021-08-26
Attending: RADIOLOGY
Payer: MEDICARE

## 2021-08-26 PROCEDURE — 77385 HB NTSTY MODUL RAD TX DLVR SMPL: CPT | Performed by: RADIOLOGY

## 2021-08-26 PROCEDURE — G6002 STEREOSCOPIC X-RAY GUIDANCE: HCPCS | Performed by: RADIOLOGY

## 2021-08-27 ENCOUNTER — APPOINTMENT (OUTPATIENT)
Dept: RADIATION ONCOLOGY | Facility: CLINIC | Age: 66
End: 2021-08-27
Attending: RADIOLOGY
Payer: MEDICARE

## 2021-08-27 PROCEDURE — 77385 HB NTSTY MODUL RAD TX DLVR SMPL: CPT | Performed by: RADIOLOGY

## 2021-08-27 PROCEDURE — 77014 CHG CT GUIDANCE PLACEMENT RAD THERAPY FIELDS: CPT | Performed by: RADIOLOGY

## 2021-08-27 PROCEDURE — 77336 RADIATION PHYSICS CONSULT: CPT | Performed by: RADIOLOGY

## 2021-08-30 ENCOUNTER — APPOINTMENT (OUTPATIENT)
Dept: RADIATION ONCOLOGY | Facility: CLINIC | Age: 66
End: 2021-08-30
Attending: RADIOLOGY
Payer: MEDICARE

## 2021-08-30 PROCEDURE — 77427 RADIATION TX MANAGEMENT X5: CPT | Performed by: INTERNAL MEDICINE

## 2021-08-30 PROCEDURE — 77385 HB NTSTY MODUL RAD TX DLVR SMPL: CPT | Performed by: RADIOLOGY

## 2021-08-30 PROCEDURE — 77014 CHG CT GUIDANCE PLACEMENT RAD THERAPY FIELDS: CPT | Performed by: RADIOLOGY

## 2021-08-31 ENCOUNTER — APPOINTMENT (OUTPATIENT)
Dept: RADIATION ONCOLOGY | Facility: CLINIC | Age: 66
End: 2021-08-31
Attending: RADIOLOGY
Payer: MEDICARE

## 2021-08-31 PROCEDURE — 77385 HB NTSTY MODUL RAD TX DLVR SMPL: CPT | Performed by: RADIOLOGY

## 2021-08-31 PROCEDURE — G6002 STEREOSCOPIC X-RAY GUIDANCE: HCPCS | Performed by: RADIOLOGY

## 2021-09-01 ENCOUNTER — APPOINTMENT (OUTPATIENT)
Dept: RADIATION ONCOLOGY | Facility: CLINIC | Age: 66
End: 2021-09-01
Attending: RADIOLOGY
Payer: MEDICARE

## 2021-09-01 PROCEDURE — 77385 HB NTSTY MODUL RAD TX DLVR SMPL: CPT | Performed by: RADIOLOGY

## 2021-09-01 PROCEDURE — 77014 CHG CT GUIDANCE PLACEMENT RAD THERAPY FIELDS: CPT | Performed by: RADIOLOGY

## 2021-09-02 ENCOUNTER — APPOINTMENT (OUTPATIENT)
Dept: RADIATION ONCOLOGY | Facility: CLINIC | Age: 66
End: 2021-09-02
Attending: RADIOLOGY
Payer: MEDICARE

## 2021-09-03 ENCOUNTER — APPOINTMENT (OUTPATIENT)
Dept: RADIATION ONCOLOGY | Facility: CLINIC | Age: 66
End: 2021-09-03
Attending: RADIOLOGY
Payer: MEDICARE

## 2021-09-03 ENCOUNTER — APPOINTMENT (OUTPATIENT)
Dept: LAB | Facility: MEDICAL CENTER | Age: 66
End: 2021-09-03
Payer: MEDICARE

## 2021-09-03 DIAGNOSIS — C61 PROSTATE CANCER (HCC): ICD-10-CM

## 2021-09-03 LAB
BASOPHILS # BLD AUTO: 0.03 THOUSANDS/ΜL (ref 0–0.1)
BASOPHILS NFR BLD AUTO: 1 % (ref 0–1)
EOSINOPHIL # BLD AUTO: 0.05 THOUSAND/ΜL (ref 0–0.61)
EOSINOPHIL NFR BLD AUTO: 1 % (ref 0–6)
ERYTHROCYTE [DISTWIDTH] IN BLOOD BY AUTOMATED COUNT: 15.5 % (ref 11.6–15.1)
HCT VFR BLD AUTO: 40.2 % (ref 36.5–49.3)
HGB BLD-MCNC: 12.9 G/DL (ref 12–17)
IMM GRANULOCYTES # BLD AUTO: 0.01 THOUSAND/UL (ref 0–0.2)
IMM GRANULOCYTES NFR BLD AUTO: 0 % (ref 0–2)
LYMPHOCYTES # BLD AUTO: 1.54 THOUSANDS/ΜL (ref 0.6–4.47)
LYMPHOCYTES NFR BLD AUTO: 40 % (ref 14–44)
MCH RBC QN AUTO: 29.7 PG (ref 26.8–34.3)
MCHC RBC AUTO-ENTMCNC: 32.1 G/DL (ref 31.4–37.4)
MCV RBC AUTO: 93 FL (ref 82–98)
MONOCYTES # BLD AUTO: 0.56 THOUSAND/ΜL (ref 0.17–1.22)
MONOCYTES NFR BLD AUTO: 15 % (ref 4–12)
NEUTROPHILS # BLD AUTO: 1.63 THOUSANDS/ΜL (ref 1.85–7.62)
NEUTS SEG NFR BLD AUTO: 43 % (ref 43–75)
NRBC BLD AUTO-RTO: 0 /100 WBCS
PLATELET # BLD AUTO: 344 THOUSANDS/UL (ref 149–390)
PMV BLD AUTO: 8.6 FL (ref 8.9–12.7)
PSA SERPL-MCNC: 9.1 NG/ML (ref 0–4)
RBC # BLD AUTO: 4.34 MILLION/UL (ref 3.88–5.62)
WBC # BLD AUTO: 3.82 THOUSAND/UL (ref 4.31–10.16)

## 2021-09-03 PROCEDURE — 84153 ASSAY OF PSA TOTAL: CPT

## 2021-09-03 PROCEDURE — 77385 HB NTSTY MODUL RAD TX DLVR SMPL: CPT | Performed by: RADIOLOGY

## 2021-09-03 PROCEDURE — G6002 STEREOSCOPIC X-RAY GUIDANCE: HCPCS | Performed by: RADIOLOGY

## 2021-09-03 PROCEDURE — 36415 COLL VENOUS BLD VENIPUNCTURE: CPT

## 2021-09-03 PROCEDURE — 85025 COMPLETE CBC W/AUTO DIFF WBC: CPT

## 2021-09-07 ENCOUNTER — APPOINTMENT (OUTPATIENT)
Dept: RADIATION ONCOLOGY | Facility: CLINIC | Age: 66
End: 2021-09-07
Attending: RADIOLOGY
Payer: MEDICARE

## 2021-09-07 PROCEDURE — 77385 HB NTSTY MODUL RAD TX DLVR SMPL: CPT | Performed by: INTERNAL MEDICINE

## 2021-09-07 PROCEDURE — G6002 STEREOSCOPIC X-RAY GUIDANCE: HCPCS | Performed by: INTERNAL MEDICINE

## 2021-09-07 PROCEDURE — 77336 RADIATION PHYSICS CONSULT: CPT | Performed by: INTERNAL MEDICINE

## 2021-09-08 ENCOUNTER — APPOINTMENT (OUTPATIENT)
Dept: RADIATION ONCOLOGY | Facility: CLINIC | Age: 66
End: 2021-09-08
Attending: RADIOLOGY
Payer: MEDICARE

## 2021-09-08 PROCEDURE — 77427 RADIATION TX MANAGEMENT X5: CPT | Performed by: RADIOLOGY

## 2021-09-08 PROCEDURE — 77385 HB NTSTY MODUL RAD TX DLVR SMPL: CPT | Performed by: RADIOLOGY

## 2021-09-09 ENCOUNTER — APPOINTMENT (OUTPATIENT)
Dept: RADIATION ONCOLOGY | Facility: CLINIC | Age: 66
End: 2021-09-09
Attending: RADIOLOGY
Payer: MEDICARE

## 2021-09-09 PROCEDURE — 77385 HB NTSTY MODUL RAD TX DLVR SMPL: CPT | Performed by: RADIOLOGY

## 2021-09-09 PROCEDURE — G6002 STEREOSCOPIC X-RAY GUIDANCE: HCPCS | Performed by: RADIOLOGY

## 2021-09-10 ENCOUNTER — APPOINTMENT (OUTPATIENT)
Dept: RADIATION ONCOLOGY | Facility: CLINIC | Age: 66
End: 2021-09-10
Attending: RADIOLOGY
Payer: MEDICARE

## 2021-09-10 PROCEDURE — 77014 CHG CT GUIDANCE PLACEMENT RAD THERAPY FIELDS: CPT | Performed by: RADIOLOGY

## 2021-09-10 PROCEDURE — 77385 HB NTSTY MODUL RAD TX DLVR SMPL: CPT | Performed by: RADIOLOGY

## 2021-09-13 ENCOUNTER — APPOINTMENT (OUTPATIENT)
Dept: RADIATION ONCOLOGY | Facility: CLINIC | Age: 66
End: 2021-09-13
Attending: RADIOLOGY
Payer: MEDICARE

## 2021-09-13 PROCEDURE — 77014 CHG CT GUIDANCE PLACEMENT RAD THERAPY FIELDS: CPT | Performed by: RADIOLOGY

## 2021-09-13 PROCEDURE — 77385 HB NTSTY MODUL RAD TX DLVR SMPL: CPT | Performed by: RADIOLOGY

## 2021-09-14 ENCOUNTER — APPOINTMENT (OUTPATIENT)
Dept: RADIATION ONCOLOGY | Facility: CLINIC | Age: 66
End: 2021-09-14
Attending: RADIOLOGY
Payer: MEDICARE

## 2021-09-14 DIAGNOSIS — C61 PROSTATE CANCER (HCC): Primary | ICD-10-CM

## 2021-09-14 LAB
BILIRUB UR QL STRIP: NEGATIVE
CLARITY UR: CLEAR
COLOR UR: YELLOW
GLUCOSE UR STRIP-MCNC: NEGATIVE MG/DL
HGB UR QL STRIP.AUTO: NEGATIVE
KETONES UR STRIP-MCNC: NEGATIVE MG/DL
LEUKOCYTE ESTERASE UR QL STRIP: NEGATIVE
NITRITE UR QL STRIP: NEGATIVE
PH UR STRIP.AUTO: 6 [PH]
PROT UR STRIP-MCNC: NEGATIVE MG/DL
SP GR UR STRIP.AUTO: 1.01 (ref 1–1.03)
UROBILINOGEN UR QL STRIP.AUTO: 0.2 E.U./DL

## 2021-09-14 PROCEDURE — G6002 STEREOSCOPIC X-RAY GUIDANCE: HCPCS | Performed by: RADIOLOGY

## 2021-09-14 PROCEDURE — 77336 RADIATION PHYSICS CONSULT: CPT | Performed by: RADIOLOGY

## 2021-09-14 PROCEDURE — 77385 HB NTSTY MODUL RAD TX DLVR SMPL: CPT | Performed by: RADIOLOGY

## 2021-09-14 PROCEDURE — 81003 URINALYSIS AUTO W/O SCOPE: CPT

## 2021-09-15 ENCOUNTER — APPOINTMENT (OUTPATIENT)
Dept: RADIATION ONCOLOGY | Facility: CLINIC | Age: 66
End: 2021-09-15
Attending: RADIOLOGY
Payer: MEDICARE

## 2021-09-15 PROCEDURE — 77427 RADIATION TX MANAGEMENT X5: CPT | Performed by: RADIOLOGY

## 2021-09-15 PROCEDURE — 77014 CHG CT GUIDANCE PLACEMENT RAD THERAPY FIELDS: CPT | Performed by: RADIOLOGY

## 2021-09-15 PROCEDURE — 77385 HB NTSTY MODUL RAD TX DLVR SMPL: CPT | Performed by: RADIOLOGY

## 2021-09-16 ENCOUNTER — APPOINTMENT (OUTPATIENT)
Dept: RADIATION ONCOLOGY | Facility: CLINIC | Age: 66
End: 2021-09-16
Attending: RADIOLOGY
Payer: MEDICARE

## 2021-09-16 DIAGNOSIS — C61 PROSTATE CANCER (HCC): Primary | ICD-10-CM

## 2021-09-16 DIAGNOSIS — N52.9 ERECTILE DYSFUNCTION, UNSPECIFIED ERECTILE DYSFUNCTION TYPE: ICD-10-CM

## 2021-09-16 PROCEDURE — G6002 STEREOSCOPIC X-RAY GUIDANCE: HCPCS | Performed by: RADIOLOGY

## 2021-09-16 PROCEDURE — 77385 HB NTSTY MODUL RAD TX DLVR SMPL: CPT | Performed by: RADIOLOGY

## 2021-09-16 RX ORDER — SILDENAFIL 100 MG/1
100 TABLET, FILM COATED ORAL DAILY PRN
Qty: 6 TABLET | Refills: 6 | Status: SHIPPED | OUTPATIENT
Start: 2021-09-16 | End: 2022-02-22 | Stop reason: SDUPTHER

## 2021-09-17 ENCOUNTER — APPOINTMENT (OUTPATIENT)
Dept: RADIATION ONCOLOGY | Facility: CLINIC | Age: 66
End: 2021-09-17
Attending: RADIOLOGY
Payer: MEDICARE

## 2021-09-20 ENCOUNTER — APPOINTMENT (OUTPATIENT)
Dept: RADIATION ONCOLOGY | Facility: CLINIC | Age: 66
End: 2021-09-20
Attending: RADIOLOGY
Payer: MEDICARE

## 2021-09-21 ENCOUNTER — APPOINTMENT (OUTPATIENT)
Dept: RADIATION ONCOLOGY | Facility: CLINIC | Age: 66
End: 2021-09-21
Attending: RADIOLOGY
Payer: MEDICARE

## 2021-09-21 PROCEDURE — 77385 HB NTSTY MODUL RAD TX DLVR SMPL: CPT | Performed by: RADIOLOGY

## 2021-09-21 PROCEDURE — G6002 STEREOSCOPIC X-RAY GUIDANCE: HCPCS | Performed by: RADIOLOGY

## 2021-09-22 ENCOUNTER — APPOINTMENT (OUTPATIENT)
Dept: RADIATION ONCOLOGY | Facility: CLINIC | Age: 66
End: 2021-09-22
Attending: RADIOLOGY
Payer: MEDICARE

## 2021-09-22 PROCEDURE — 77385 HB NTSTY MODUL RAD TX DLVR SMPL: CPT | Performed by: RADIOLOGY

## 2021-09-22 PROCEDURE — 77014 CHG CT GUIDANCE PLACEMENT RAD THERAPY FIELDS: CPT | Performed by: RADIOLOGY

## 2021-09-23 ENCOUNTER — APPOINTMENT (OUTPATIENT)
Dept: RADIATION ONCOLOGY | Facility: CLINIC | Age: 66
End: 2021-09-23
Attending: RADIOLOGY
Payer: MEDICARE

## 2021-09-23 PROCEDURE — 77336 RADIATION PHYSICS CONSULT: CPT | Performed by: RADIOLOGY

## 2021-09-23 PROCEDURE — 77385 HB NTSTY MODUL RAD TX DLVR SMPL: CPT | Performed by: RADIOLOGY

## 2021-09-23 PROCEDURE — G6002 STEREOSCOPIC X-RAY GUIDANCE: HCPCS | Performed by: RADIOLOGY

## 2021-09-24 ENCOUNTER — APPOINTMENT (OUTPATIENT)
Dept: RADIATION ONCOLOGY | Facility: CLINIC | Age: 66
End: 2021-09-24
Attending: RADIOLOGY
Payer: MEDICARE

## 2021-09-24 PROCEDURE — 77385 HB NTSTY MODUL RAD TX DLVR SMPL: CPT | Performed by: RADIOLOGY

## 2021-09-24 PROCEDURE — 77014 CHG CT GUIDANCE PLACEMENT RAD THERAPY FIELDS: CPT | Performed by: RADIOLOGY

## 2021-09-24 PROCEDURE — 77427 RADIATION TX MANAGEMENT X5: CPT | Performed by: RADIOLOGY

## 2021-09-27 ENCOUNTER — TELEPHONE (OUTPATIENT)
Dept: UROLOGY | Facility: HOSPITAL | Age: 66
End: 2021-09-27

## 2021-09-27 ENCOUNTER — APPOINTMENT (OUTPATIENT)
Dept: RADIATION ONCOLOGY | Facility: CLINIC | Age: 66
End: 2021-09-27
Attending: RADIOLOGY
Payer: MEDICARE

## 2021-09-27 PROCEDURE — 77385 HB NTSTY MODUL RAD TX DLVR SMPL: CPT | Performed by: RADIOLOGY

## 2021-09-27 PROCEDURE — 77014 CHG CT GUIDANCE PLACEMENT RAD THERAPY FIELDS: CPT | Performed by: RADIOLOGY

## 2021-09-27 NOTE — TELEPHONE ENCOUNTER
Patient's wife dropped off insurance claim forms to be filled out by Dr Lisa Candelaria or Nurse  (Envelope on Shweta's desk)  I was unable to find a form for records fee  Patient's wife informed of fee required for filling out forms  Please fill out and Pt will pay when forms are ready to

## 2021-09-28 ENCOUNTER — APPOINTMENT (OUTPATIENT)
Dept: RADIATION ONCOLOGY | Facility: CLINIC | Age: 66
End: 2021-09-28
Attending: RADIOLOGY
Payer: MEDICARE

## 2021-09-28 PROCEDURE — 77385 HB NTSTY MODUL RAD TX DLVR SMPL: CPT | Performed by: RADIOLOGY

## 2021-09-28 PROCEDURE — G6002 STEREOSCOPIC X-RAY GUIDANCE: HCPCS | Performed by: RADIOLOGY

## 2021-09-28 NOTE — TELEPHONE ENCOUNTER
Called Veda back and  Notified her that paperwork is almost complete   Once Dr Martines Pick signs paperwork will have her come in to office to pick it up

## 2021-09-29 ENCOUNTER — APPOINTMENT (OUTPATIENT)
Dept: RADIATION ONCOLOGY | Facility: CLINIC | Age: 66
End: 2021-09-29
Attending: RADIOLOGY
Payer: MEDICARE

## 2021-09-29 PROCEDURE — 77014 CHG CT GUIDANCE PLACEMENT RAD THERAPY FIELDS: CPT | Performed by: RADIOLOGY

## 2021-09-29 PROCEDURE — 77385 HB NTSTY MODUL RAD TX DLVR SMPL: CPT | Performed by: RADIOLOGY

## 2021-09-30 ENCOUNTER — APPOINTMENT (OUTPATIENT)
Dept: RADIATION ONCOLOGY | Facility: CLINIC | Age: 66
End: 2021-09-30
Attending: RADIOLOGY
Payer: MEDICARE

## 2021-09-30 PROCEDURE — 77336 RADIATION PHYSICS CONSULT: CPT | Performed by: RADIOLOGY

## 2021-09-30 PROCEDURE — G6002 STEREOSCOPIC X-RAY GUIDANCE: HCPCS | Performed by: RADIOLOGY

## 2021-09-30 PROCEDURE — 77385 HB NTSTY MODUL RAD TX DLVR SMPL: CPT | Performed by: RADIOLOGY

## 2021-10-01 ENCOUNTER — APPOINTMENT (OUTPATIENT)
Dept: RADIATION ONCOLOGY | Facility: CLINIC | Age: 66
End: 2021-10-01
Attending: RADIOLOGY
Payer: MEDICARE

## 2021-10-01 PROCEDURE — 77427 RADIATION TX MANAGEMENT X5: CPT | Performed by: INTERNAL MEDICINE

## 2021-10-01 PROCEDURE — 77385 HB NTSTY MODUL RAD TX DLVR SMPL: CPT | Performed by: RADIOLOGY

## 2021-10-04 ENCOUNTER — APPOINTMENT (OUTPATIENT)
Dept: RADIATION ONCOLOGY | Facility: CLINIC | Age: 66
End: 2021-10-04
Attending: RADIOLOGY
Payer: MEDICARE

## 2021-10-04 PROCEDURE — 77014 CHG CT GUIDANCE PLACEMENT RAD THERAPY FIELDS: CPT | Performed by: RADIOLOGY

## 2021-10-04 PROCEDURE — 77385 HB NTSTY MODUL RAD TX DLVR SMPL: CPT | Performed by: RADIOLOGY

## 2021-10-05 ENCOUNTER — APPOINTMENT (OUTPATIENT)
Dept: RADIATION ONCOLOGY | Facility: CLINIC | Age: 66
End: 2021-10-05
Attending: RADIOLOGY
Payer: MEDICARE

## 2021-10-05 PROCEDURE — 77385 HB NTSTY MODUL RAD TX DLVR SMPL: CPT | Performed by: RADIOLOGY

## 2021-10-05 PROCEDURE — G6002 STEREOSCOPIC X-RAY GUIDANCE: HCPCS | Performed by: RADIOLOGY

## 2021-10-06 ENCOUNTER — APPOINTMENT (OUTPATIENT)
Dept: RADIATION ONCOLOGY | Facility: CLINIC | Age: 66
End: 2021-10-06
Attending: RADIOLOGY
Payer: MEDICARE

## 2021-10-06 PROCEDURE — 77385 HB NTSTY MODUL RAD TX DLVR SMPL: CPT | Performed by: RADIOLOGY

## 2021-10-06 PROCEDURE — 77014 CHG CT GUIDANCE PLACEMENT RAD THERAPY FIELDS: CPT | Performed by: RADIOLOGY

## 2021-10-07 ENCOUNTER — APPOINTMENT (OUTPATIENT)
Dept: RADIATION ONCOLOGY | Facility: CLINIC | Age: 66
End: 2021-10-07
Attending: RADIOLOGY
Payer: MEDICARE

## 2021-10-07 PROCEDURE — G6002 STEREOSCOPIC X-RAY GUIDANCE: HCPCS | Performed by: INTERNAL MEDICINE

## 2021-10-07 PROCEDURE — 77336 RADIATION PHYSICS CONSULT: CPT | Performed by: INTERNAL MEDICINE

## 2021-10-07 PROCEDURE — 77385 HB NTSTY MODUL RAD TX DLVR SMPL: CPT | Performed by: INTERNAL MEDICINE

## 2021-10-08 ENCOUNTER — APPOINTMENT (OUTPATIENT)
Dept: RADIATION ONCOLOGY | Facility: CLINIC | Age: 66
End: 2021-10-08
Attending: RADIOLOGY
Payer: MEDICARE

## 2021-10-08 PROCEDURE — 77427 RADIATION TX MANAGEMENT X5: CPT | Performed by: RADIOLOGY

## 2021-10-08 PROCEDURE — 77014 CHG CT GUIDANCE PLACEMENT RAD THERAPY FIELDS: CPT | Performed by: INTERNAL MEDICINE

## 2021-10-08 PROCEDURE — 77385 HB NTSTY MODUL RAD TX DLVR SMPL: CPT | Performed by: INTERNAL MEDICINE

## 2021-10-11 ENCOUNTER — APPOINTMENT (OUTPATIENT)
Dept: RADIATION ONCOLOGY | Facility: CLINIC | Age: 66
End: 2021-10-11
Attending: RADIOLOGY
Payer: MEDICARE

## 2021-10-11 PROCEDURE — 77385 HB NTSTY MODUL RAD TX DLVR SMPL: CPT | Performed by: RADIOLOGY

## 2021-10-11 PROCEDURE — 77014 CHG CT GUIDANCE PLACEMENT RAD THERAPY FIELDS: CPT | Performed by: RADIOLOGY

## 2021-10-12 ENCOUNTER — APPOINTMENT (OUTPATIENT)
Dept: RADIATION ONCOLOGY | Facility: CLINIC | Age: 66
End: 2021-10-12
Attending: RADIOLOGY
Payer: MEDICARE

## 2021-10-12 PROCEDURE — G6002 STEREOSCOPIC X-RAY GUIDANCE: HCPCS | Performed by: RADIOLOGY

## 2021-10-12 PROCEDURE — 77385 HB NTSTY MODUL RAD TX DLVR SMPL: CPT | Performed by: RADIOLOGY

## 2021-10-13 ENCOUNTER — APPOINTMENT (OUTPATIENT)
Dept: RADIATION ONCOLOGY | Facility: CLINIC | Age: 66
End: 2021-10-13
Attending: RADIOLOGY
Payer: MEDICARE

## 2021-10-13 PROCEDURE — 77385 HB NTSTY MODUL RAD TX DLVR SMPL: CPT | Performed by: RADIOLOGY

## 2021-10-13 PROCEDURE — 77014 CHG CT GUIDANCE PLACEMENT RAD THERAPY FIELDS: CPT | Performed by: RADIOLOGY

## 2021-10-14 ENCOUNTER — APPOINTMENT (OUTPATIENT)
Dept: RADIATION ONCOLOGY | Facility: CLINIC | Age: 66
End: 2021-10-14
Attending: RADIOLOGY
Payer: MEDICARE

## 2021-10-14 ENCOUNTER — IMMUNIZATIONS (OUTPATIENT)
Dept: FAMILY MEDICINE CLINIC | Facility: CLINIC | Age: 66
End: 2021-10-14
Payer: MEDICARE

## 2021-10-14 DIAGNOSIS — Z23 FLU VACCINE NEED: Primary | ICD-10-CM

## 2021-10-14 PROCEDURE — 90662 IIV NO PRSV INCREASED AG IM: CPT | Performed by: FAMILY MEDICINE

## 2021-10-14 PROCEDURE — 77014 CHG CT GUIDANCE PLACEMENT RAD THERAPY FIELDS: CPT | Performed by: RADIOLOGY

## 2021-10-14 PROCEDURE — 77336 RADIATION PHYSICS CONSULT: CPT | Performed by: RADIOLOGY

## 2021-10-14 PROCEDURE — G0008 ADMIN INFLUENZA VIRUS VAC: HCPCS | Performed by: FAMILY MEDICINE

## 2021-10-14 PROCEDURE — 77385 HB NTSTY MODUL RAD TX DLVR SMPL: CPT | Performed by: RADIOLOGY

## 2021-10-15 ENCOUNTER — APPOINTMENT (OUTPATIENT)
Dept: RADIATION ONCOLOGY | Facility: CLINIC | Age: 66
End: 2021-10-15
Attending: RADIOLOGY
Payer: MEDICARE

## 2021-10-15 PROCEDURE — 77427 RADIATION TX MANAGEMENT X5: CPT | Performed by: RADIOLOGY

## 2021-10-15 PROCEDURE — 77014 CHG CT GUIDANCE PLACEMENT RAD THERAPY FIELDS: CPT | Performed by: INTERNAL MEDICINE

## 2021-10-15 PROCEDURE — 77385 HB NTSTY MODUL RAD TX DLVR SMPL: CPT | Performed by: INTERNAL MEDICINE

## 2021-10-18 ENCOUNTER — APPOINTMENT (OUTPATIENT)
Dept: RADIATION ONCOLOGY | Facility: CLINIC | Age: 66
End: 2021-10-18
Attending: RADIOLOGY
Payer: MEDICARE

## 2021-10-18 PROCEDURE — 77014 CHG CT GUIDANCE PLACEMENT RAD THERAPY FIELDS: CPT | Performed by: RADIOLOGY

## 2021-10-18 PROCEDURE — 77385 HB NTSTY MODUL RAD TX DLVR SMPL: CPT | Performed by: RADIOLOGY

## 2021-10-19 ENCOUNTER — APPOINTMENT (OUTPATIENT)
Dept: RADIATION ONCOLOGY | Facility: CLINIC | Age: 66
End: 2021-10-19
Attending: RADIOLOGY
Payer: MEDICARE

## 2021-10-19 PROCEDURE — 77385 HB NTSTY MODUL RAD TX DLVR SMPL: CPT | Performed by: INTERNAL MEDICINE

## 2021-10-19 PROCEDURE — G6002 STEREOSCOPIC X-RAY GUIDANCE: HCPCS | Performed by: INTERNAL MEDICINE

## 2021-10-20 ENCOUNTER — APPOINTMENT (OUTPATIENT)
Dept: RADIATION ONCOLOGY | Facility: CLINIC | Age: 66
End: 2021-10-20
Attending: RADIOLOGY
Payer: MEDICARE

## 2021-10-20 PROCEDURE — 77014 CHG CT GUIDANCE PLACEMENT RAD THERAPY FIELDS: CPT | Performed by: RADIOLOGY

## 2021-10-20 PROCEDURE — 77385 HB NTSTY MODUL RAD TX DLVR SMPL: CPT | Performed by: RADIOLOGY

## 2021-10-21 ENCOUNTER — APPOINTMENT (OUTPATIENT)
Dept: RADIATION ONCOLOGY | Facility: CLINIC | Age: 66
End: 2021-10-21
Attending: RADIOLOGY
Payer: MEDICARE

## 2021-10-21 DIAGNOSIS — C61 PROSTATE CANCER (HCC): Primary | ICD-10-CM

## 2021-10-21 PROCEDURE — G6002 STEREOSCOPIC X-RAY GUIDANCE: HCPCS | Performed by: RADIOLOGY

## 2021-10-21 PROCEDURE — 77336 RADIATION PHYSICS CONSULT: CPT | Performed by: RADIOLOGY

## 2021-10-21 PROCEDURE — 77385 HB NTSTY MODUL RAD TX DLVR SMPL: CPT | Performed by: RADIOLOGY

## 2021-10-22 ENCOUNTER — APPOINTMENT (OUTPATIENT)
Dept: RADIATION ONCOLOGY | Facility: CLINIC | Age: 66
End: 2021-10-22
Attending: RADIOLOGY
Payer: MEDICARE

## 2021-10-22 PROCEDURE — 77014 CHG CT GUIDANCE PLACEMENT RAD THERAPY FIELDS: CPT | Performed by: INTERNAL MEDICINE

## 2021-10-22 PROCEDURE — 77385 HB NTSTY MODUL RAD TX DLVR SMPL: CPT | Performed by: INTERNAL MEDICINE

## 2021-10-25 ENCOUNTER — APPOINTMENT (OUTPATIENT)
Dept: RADIATION ONCOLOGY | Facility: CLINIC | Age: 66
End: 2021-10-25
Attending: RADIOLOGY
Payer: MEDICARE

## 2021-10-25 PROCEDURE — 77014 CHG CT GUIDANCE PLACEMENT RAD THERAPY FIELDS: CPT | Performed by: RADIOLOGY

## 2021-10-25 PROCEDURE — 77385 HB NTSTY MODUL RAD TX DLVR SMPL: CPT | Performed by: RADIOLOGY

## 2021-10-26 ENCOUNTER — APPOINTMENT (OUTPATIENT)
Dept: RADIATION ONCOLOGY | Facility: CLINIC | Age: 66
End: 2021-10-26
Attending: RADIOLOGY
Payer: MEDICARE

## 2021-10-26 PROCEDURE — G6002 STEREOSCOPIC X-RAY GUIDANCE: HCPCS | Performed by: INTERNAL MEDICINE

## 2021-10-26 PROCEDURE — 77385 HB NTSTY MODUL RAD TX DLVR SMPL: CPT | Performed by: INTERNAL MEDICINE

## 2021-10-27 ENCOUNTER — APPOINTMENT (OUTPATIENT)
Dept: RADIATION ONCOLOGY | Facility: CLINIC | Age: 66
End: 2021-10-27
Attending: RADIOLOGY
Payer: MEDICARE

## 2021-10-27 PROCEDURE — 77336 RADIATION PHYSICS CONSULT: CPT | Performed by: RADIOLOGY

## 2021-10-27 PROCEDURE — 77385 HB NTSTY MODUL RAD TX DLVR SMPL: CPT | Performed by: RADIOLOGY

## 2021-10-27 PROCEDURE — 77014 CHG CT GUIDANCE PLACEMENT RAD THERAPY FIELDS: CPT | Performed by: RADIOLOGY

## 2021-11-01 ENCOUNTER — TELEPHONE (OUTPATIENT)
Dept: FAMILY MEDICINE CLINIC | Facility: CLINIC | Age: 66
End: 2021-11-01

## 2021-11-16 ENCOUNTER — TELEPHONE (OUTPATIENT)
Dept: FAMILY MEDICINE CLINIC | Facility: CLINIC | Age: 66
End: 2021-11-16

## 2021-12-01 ENCOUNTER — LAB (OUTPATIENT)
Dept: LAB | Facility: HOSPITAL | Age: 66
End: 2021-12-01
Payer: MEDICARE

## 2021-12-01 DIAGNOSIS — R97.20 ELEVATED PSA: ICD-10-CM

## 2021-12-01 DIAGNOSIS — Z12.5 ENCOUNTER FOR SCREENING FOR MALIGNANT NEOPLASM OF PROSTATE: ICD-10-CM

## 2021-12-01 DIAGNOSIS — C61 PROSTATE CANCER (HCC): ICD-10-CM

## 2021-12-01 LAB — PSA SERPL-MCNC: 2.8 NG/ML (ref 0–4)

## 2021-12-01 PROCEDURE — G0103 PSA SCREENING: HCPCS

## 2021-12-01 PROCEDURE — 36415 COLL VENOUS BLD VENIPUNCTURE: CPT

## 2021-12-07 ENCOUNTER — TELEPHONE (OUTPATIENT)
Dept: FAMILY MEDICINE CLINIC | Facility: CLINIC | Age: 66
End: 2021-12-07

## 2021-12-09 ENCOUNTER — TELEMEDICINE (OUTPATIENT)
Dept: RADIATION ONCOLOGY | Facility: CLINIC | Age: 66
End: 2021-12-09
Attending: RADIOLOGY

## 2021-12-09 DIAGNOSIS — C61 PROSTATE CANCER (HCC): Primary | ICD-10-CM

## 2021-12-09 PROCEDURE — 99024 POSTOP FOLLOW-UP VISIT: CPT | Performed by: RADIOLOGY

## 2022-01-06 NOTE — TELEPHONE ENCOUNTER
Pt calling back regarding this encounter, stated she had spoken to Smita Locke prior  She had questions in regards to paperwork/billing  Please advise

## 2022-01-10 ENCOUNTER — TELEPHONE (OUTPATIENT)
Dept: OTHER | Facility: OTHER | Age: 67
End: 2022-01-10

## 2022-01-10 NOTE — TELEPHONE ENCOUNTER
Called Erika back about and she was looking for dates that [place] saw Dr Sharona Puente  - Radiation oncology  Explained that I didn't see any of he treatments in the system   She will have to call that office

## 2022-01-10 NOTE — TELEPHONE ENCOUNTER
Pt's wife called in stating she would like Yusuf Lao from the office to call her back regarding paperwork

## 2022-01-17 ENCOUNTER — OFFICE VISIT (OUTPATIENT)
Dept: UROLOGY | Facility: HOSPITAL | Age: 67
End: 2022-01-17
Payer: MEDICARE

## 2022-01-17 VITALS
BODY MASS INDEX: 24.38 KG/M2 | SYSTOLIC BLOOD PRESSURE: 128 MMHG | OXYGEN SATURATION: 94 % | DIASTOLIC BLOOD PRESSURE: 78 MMHG | HEART RATE: 111 BPM | WEIGHT: 180 LBS | HEIGHT: 72 IN

## 2022-01-17 DIAGNOSIS — C61 PROSTATE CANCER (HCC): Primary | ICD-10-CM

## 2022-01-17 PROCEDURE — 99213 OFFICE O/P EST LOW 20 MIN: CPT | Performed by: NURSE PRACTITIONER

## 2022-01-17 NOTE — PROGRESS NOTES
01/17/22    [facility]   1955   [id number]     Assessment  1 Sabina 7 prostate cancer s/p radiation therapy (10/2021)    Discussion/Plan  1 Rachel 7 prostate cancer s/p radiation therapy (10/2021)   12/1/21 PSA 2 8     Patient will obtain PSA in 3 months  Follow up with Radiation Oncology in 6 months  All questions answered at this time  Subjective  HPI   Rich Osman is a 78year old male known to Dr Bella Weiner who presents in follow up  He has history of Sabina 7 prostate cancer  He had his first prostate biopsy on 4/28/2021 which was positive in 6/12 cores bilaterally for Rachel score 4+3=7 disease in 3 cores with the other 3 cores showing 3+4=7 disease  His bone scan and CT were negative for metastatic disease  He elected to pursue radiation therapy and he was referred to Radiation Oncology  He had fiducial markers and SpaceOAR placed in July 2021 by Dr Jayla Guzman  He was diagnosed when his PSA was 10 7  He is a current 1 ppd smoker x 40 years  He has follow up with [facility] in 6 months  He takes 50 mg Sildenafil as needed for erectile dysfunction  He denies lower urinary jerome symptoms, pain, or gross hematuria  He completed Radiation in October 2021     PMH: HTN, arthritis, nicotine dependence, prostate cancer     Component      Latest Ref Rng & Units 12/8/2020 3/12/2021 9/3/2021 12/1/2021           9:05 AM  8:51 AM 12:06 PM 11:38 AM   PSA, Total      0 0 - 4 0 ng/mL 8 0 (H) 10 7 (H) 9 1 (H) 2 8       Review of Systems - History obtained from chart review and the patient  General ROS: negative  Psychological ROS: negative  Endocrine ROS: negative  Breast ROS: negative for breast lumps  Respiratory ROS: no cough, shortness of breath, or wheezing  Cardiovascular ROS: negative  Gastrointestinal ROS: no abdominal pain, change in bowel habits, or black or bloody stools  Genito-Urinary ROS: no dysuria, trouble voiding, or hematuria  Musculoskeletal ROS: negative  Neurological ROS: no TIA or stroke symptoms  Dermatological ROS: negative     Objective  Physical Exam  Vitals and nursing note reviewed  Constitutional:       General: He is awake  He is not in acute distress  Appearance: Normal appearance  He is well-developed, well-groomed and normal weight  He is not ill-appearing, toxic-appearing or diaphoretic  Pulmonary:      Effort: Pulmonary effort is normal    Abdominal:      Tenderness: There is no right CVA tenderness or left CVA tenderness  Musculoskeletal:         General: Normal range of motion  Cervical back: Normal range of motion and neck supple  Skin:     General: Skin is warm  Neurological:      General: No focal deficit present  Mental Status: He is alert and oriented to person, place, and time  Mental status is at baseline  Psychiatric:         Attention and Perception: Attention normal          Mood and Affect: Mood normal          Speech: Speech normal          Behavior: Behavior normal  Behavior is cooperative  Thought Content: Thought content normal          Cognition and Memory: Cognition normal          Judgment: Judgment normal            Final Diagnosis 4/2021    A  Prostate, right lateral base:  - Prostatic adenocarcinoma, [place] score 3 + 4 = 7, Prognostic Grade Group 2, discontinuously involving 7% of one core:  - see Note  * tumor is 10% grade 4   * periprostatic fat invasion:  not identified  * lymph-vascular invasion:  not identified  * perineural invasion:  not identified   - Additional pathologic findings:  N/A     B  Prostate, right lateral mid:  - Atypical small acinar proliferation in a single focus, < 5% of one core, suspicious for low grade prostatic adenocarcinoma - see Note  C  Prostate, right lateral apex x 2:  - Prostatic adenocarcinoma, [place] score 4 + 3 = 7, Prognostic Grade Group 3, discontinuously involving 23% of one of two cores:  - see Note     * tumor is 60% grade 4, with focal cribriform morphology   * periprostatic fat invasion:  not identified  * lymph-vascular invasion:  not identified  * perineural invasion:  not identified   - Additional pathologic findings:  N/A     D  Prostate, left lateral base:  - Prostatic adenocarcinoma, Rachel score 4 + 3 = 7, Prognostic Grade Group 3, discontinuously involving 35% of one core:  - see Note  * tumor is 70% grade 4, with focal cribriform morphology   * periprostatic fat invasion:  not identified  * lymph-vascular invasion:  not identified  * perineural invasion:  not identified   - Additional pathologic findings:  N/A     Note: Block D1 is suggested if additional studies are indicated (at least 0 5 mm of tumor for Prolaris)      E  Prostate, left lateral mid:  - Atypical small acinar proliferation in a single focus, < 5% of one core, suspicious for low grade prostatic adenocarcinoma - see Note  F  Prostate, left lateral apex:  - Benign prostate tissue      G  Prostate, left base:  - Prostatic adenocarcinoma, Rachel score 3 + 4 = 7, Prognostic Grade Group 2, continuously involving 5% of one core:   * tumor is 20% grade 4   * periprostatic fat invasion:  not identified  * lymph-vascular invasion:  not identified  * perineural invasion:  focally present  - Additional pathologic findings:  N/A     H  Prostate, left mid:  - High grade prostatic intraepithelial neoplasia - see Note        I  Prostate, left apex:  - Benign prostate tissue  J  Prostate, right base:  - Benign prostate tissue  K  Prostate, right mid:  - Prostatic adenocarcinoma, [place] score 4 + 3 = 7, Prognostic Grade Group 3, continuously involving 19% of one core:   * tumor is 90% grade 4 and displays focal cribriform morphology   * periprostatic fat invasion:  not identified  * lymph-vascular invasion:  not identified  * perineural invasion:  not identified   - Additional pathologic findings:  N/A     L   Prostate, right apex:  - Prostatic adenocarcinoma, Rachel score 3 + 4 = 7, Prognostic Grade Group 2, discontinuously involving 19% of one core - see Note  * tumor is 40% grade 4  * periprostatic fat invasion:  not identified  * lymph-vascular invasion:  not identified  * perineural invasion:  focally present  - Additional pathologic findings:  N/A         BONE SCAN  WHOLE BODY     INDICATION: C61: Malignant neoplasm of prostate     PREVIOUS FILM CORRELATION:    No prior pertinent studies for comparison      TECHNIQUE:   This study was performed following the intravenous administration of 27 3 mCi Tc-99m labeled MDP  Delayed, anterior and posterior whole body images were acquired, 2-3 hours after radiopharmaceutical administration      FINDINGS:     Heterogeneous activity in the lumbar spine may be degenerative  Degenerative changes in the shoulders, wrists, knees  Symmetric renal uptake  No additional lesions that would be concerning for osseous metastases      IMPRESSION:     1  Heterogeneous activity in the lumbar spine may be degenerative  Correlation with radiographs recommended  2   Otherwise no scintigraphic evidence of osseous metastasis  AUA SYMPTOM SCORE      Most Recent Value   AUA SYMPTOM SCORE    How often have you had a sensation of not emptying your bladder completely after you finished urinating? 1   How often have you had to urinate again less than two hours after you finished urinating? 2   How often have you found you stopped and started again several times when you urinate? 0   How often have you found it difficult to postpone urination? 1   How often have you had a weak urinary stream? 1   How often have you had to push or strain to begin urination?  1   How many times did you most typically get up to urinate from the time you went to bed at night until the time you got up in the morning? 0   Quality of Life: If you were to spend the rest of your life with your urinary condition just the way it is now, how would you feel about that? 1   AUA SYMPTOM SCORE 6 Jim Dior

## 2022-01-22 ENCOUNTER — IMMUNIZATIONS (OUTPATIENT)
Dept: FAMILY MEDICINE CLINIC | Facility: HOSPITAL | Age: 67
End: 2022-01-22

## 2022-01-22 DIAGNOSIS — Z23 ENCOUNTER FOR IMMUNIZATION: Primary | ICD-10-CM

## 2022-01-22 PROCEDURE — 0001A COVID-19 PFIZER VACC 0.3 ML: CPT

## 2022-01-22 PROCEDURE — 91300 COVID-19 PFIZER VACC 0.3 ML: CPT

## 2022-02-22 DIAGNOSIS — N52.9 ERECTILE DYSFUNCTION, UNSPECIFIED ERECTILE DYSFUNCTION TYPE: ICD-10-CM

## 2022-02-22 DIAGNOSIS — C61 PROSTATE CANCER (HCC): ICD-10-CM

## 2022-02-22 RX ORDER — SILDENAFIL 100 MG/1
100 TABLET, FILM COATED ORAL DAILY PRN
Qty: 6 TABLET | Refills: 6 | Status: SHIPPED | OUTPATIENT
Start: 2022-02-22 | End: 2022-07-06 | Stop reason: SDUPTHER

## 2022-04-08 ENCOUNTER — OFFICE VISIT (OUTPATIENT)
Dept: FAMILY MEDICINE CLINIC | Facility: CLINIC | Age: 67
End: 2022-04-08
Payer: MEDICARE

## 2022-04-08 VITALS
WEIGHT: 185 LBS | BODY MASS INDEX: 25.06 KG/M2 | HEART RATE: 80 BPM | OXYGEN SATURATION: 97 % | HEIGHT: 72 IN | SYSTOLIC BLOOD PRESSURE: 124 MMHG | DIASTOLIC BLOOD PRESSURE: 80 MMHG

## 2022-04-08 DIAGNOSIS — Z12.11 COLON CANCER SCREENING: ICD-10-CM

## 2022-04-08 DIAGNOSIS — F17.210 CIGARETTE NICOTINE DEPENDENCE WITHOUT COMPLICATION: ICD-10-CM

## 2022-04-08 DIAGNOSIS — M19.90 ARTHRITIS: ICD-10-CM

## 2022-04-08 DIAGNOSIS — I10 ESSENTIAL HYPERTENSION: ICD-10-CM

## 2022-04-08 DIAGNOSIS — Z00.00 MEDICARE ANNUAL WELLNESS VISIT, SUBSEQUENT: Primary | ICD-10-CM

## 2022-04-08 DIAGNOSIS — C61 PROSTATE CANCER (HCC): ICD-10-CM

## 2022-04-08 PROCEDURE — 1123F ACP DISCUSS/DSCN MKR DOCD: CPT | Performed by: FAMILY MEDICINE

## 2022-04-08 PROCEDURE — 99214 OFFICE O/P EST MOD 30 MIN: CPT | Performed by: FAMILY MEDICINE

## 2022-04-08 PROCEDURE — G0439 PPPS, SUBSEQ VISIT: HCPCS | Performed by: FAMILY MEDICINE

## 2022-04-08 RX ORDER — AMLODIPINE BESYLATE 5 MG/1
5 TABLET ORAL DAILY
Qty: 90 TABLET | Refills: 3 | Status: SHIPPED | OUTPATIENT
Start: 2022-04-08 | End: 2022-06-17

## 2022-04-08 NOTE — PATIENT INSTRUCTIONS
Continue current high blood pressure medication  Complete Cologuard when kit arise  Consider Prevnar-20 vaccine in the future  COVID-19 booster 2-6 months  Medicare Preventive Visit Patient Instructions  Thank you for completing your Welcome to Medicare Visit or Medicare Annual Wellness Visit today  Your next wellness visit will be due in one year (4/9/2023)  The screening/preventive services that you may require over the next 5-10 years are detailed below  Some tests may not apply to you based off risk factors and/or age  Screening tests ordered at today's visit but not completed yet may show as past due  Also, please note that scanned in results may not display below  Preventive Screenings:  Service Recommendations Previous Testing/Comments   Colorectal Cancer Screening  · Colonoscopy    · Fecal Occult Blood Test (FOBT)/Fecal Immunochemical Test (FIT)  · Fecal DNA/Cologuard Test  · Flexible Sigmoidoscopy Age: 54-65 years old   Colonoscopy: every 10 years (May be performed more frequently if at higher risk)  OR  FOBT/FIT: every 1 year  OR  Cologuard: every 3 years  OR  Sigmoidoscopy: every 5 years  Screening may be recommended earlier than age 48 if at higher risk for colorectal cancer  Also, an individualized decision between you and your healthcare provider will decide whether screening between the ages of 74-80 would be appropriate   Colonoscopy: Not on file  FOBT/FIT: Not on file  Cologuard: Not on file  Sigmoidoscopy: Not on file          Prostate Cancer Screening Individualized decision between patient and health care provider in men between ages of 53-78   Medicare will cover every 12 months beginning on the day after your 50th birthday PSA: 2 8 ng/mL           Hepatitis C Screening Once for adults born between [facility]  More frequently in patients at high risk for Hepatitis C Hep C Antibody: Not on file        Diabetes Screening 1-2 times per year if you're at risk for diabetes or have pre-diabetes Fasting glucose: 108 mg/dL   A1C: No results in last 5 years        Cholesterol Screening Once every 5 years if you don't have a lipid disorder  May order more often based on risk factors  Lipid panel: 12/08/2020           Other Preventive Screenings Covered by Medicare:  1  Abdominal Aortic Aneurysm (AAA) Screening: covered once if your at risk  You're considered to be at risk if you have a family history of AAA or a male between the age of 73-68 who smoking at least 100 cigarettes in your lifetime  2  Lung Cancer Screening: covers low dose CT scan once per year if you meet all of the following conditions: (1) Age 50-69; (2) No signs or symptoms of lung cancer; (3) Current smoker or have quit smoking within the last 15 years; (4) You have a tobacco smoking history of at least 30 pack years (packs per day x number of years you smoked); (5) You get a written order from a healthcare provider  3  Glaucoma Screening: covered annually if you're considered high risk: (1) You have diabetes OR (2) Family history of glaucoma OR (3)  aged 48 and older OR (3)  American aged 72 and older  3  Osteoporosis Screening: covered every 2 years if you meet one of the following conditions: (1) Have a vertebral abnormality; (2) On glucocorticoid therapy for more than 3 months; (3) Have primary hyperparathyroidism; (4) On osteoporosis medications and need to assess response to drug therapy  5  HIV Screening: covered annually if you're between the age of 12-76  Also covered annually if you are younger than 13 and older than 72 with risk factors for HIV infection  For pregnant patients, it is covered up to 3 times per pregnancy      Immunizations:  Immunization Recommendations   Influenza Vaccine Annual influenza vaccination during flu season is recommended for all persons aged >= 6 months who do not have contraindications   Pneumococcal Vaccine (Prevnar and Pneumovax)  * Prevnar = PCV13  * Pneumovax = PPSV23 Adults 25-60 years old: 1-3 doses may be recommended based on certain risk factors  Adults 72 years old: Prevnar (PCV13) vaccine recommended followed by Pneumovax (PPSV23) vaccine  If already received PPSV23 since turning 65, then PCV13 recommended at least one year after PPSV23 dose  Hepatitis B Vaccine 3 dose series if at intermediate or high risk (ex: diabetes, end stage renal disease, liver disease)   Tetanus (Td) Vaccine - COST NOT COVERED BY MEDICARE PART B Following completion of primary series, a booster dose should be given every 10 years to maintain immunity against tetanus  Td may also be given as tetanus wound prophylaxis  Tdap Vaccine - COST NOT COVERED BY MEDICARE PART B Recommended at least once for all adults  For pregnant patients, recommended with each pregnancy  Shingles Vaccine (Shingrix) - COST NOT COVERED BY MEDICARE PART B  2 shot series recommended in those aged 48 and above     Health Maintenance Due:      Topic Date Due    Hepatitis C Screening  Never done    Lung Cancer Screening  Never done    Colorectal Cancer Screening  Never done     Immunizations Due:      Topic Date Due    DTaP,Tdap,and Td Vaccines (1 - Tdap) Never done    Pneumococcal Vaccine: 65+ Years (1 of 2 - PPSV23) 12/18/2020     Advance Directives   What are advance directives? Advance directives are legal documents that state your wishes and plans for medical care  These plans are made ahead of time in case you lose your ability to make decisions for yourself  Advance directives can apply to any medical decision, such as the treatments you want, and if you want to donate organs  What are the types of advance directives? There are many types of advance directives, and each state has rules about how to use them  You may choose a combination of any of the following:  · Living will: This is a written record of the treatment you want   You can also choose which treatments you do not want, which to limit, and which to stop at a certain time  This includes surgery, medicine, IV fluid, and tube feedings  · Durable power of  for healthcare [facility]): This is a written record that states who you want to make healthcare choices for you when you are unable to make them for yourself  This person, called a proxy, is usually a family member or a friend  You may choose more than 1 proxy  · Do not resuscitate (DNR) order:  A DNR order is used in case your heart stops beating or you stop breathing  It is a request not to have certain forms of treatment, such as CPR  A DNR order may be included in other types of advance directives  · Medical directive: This covers the care that you want if you are in a coma, near death, or unable to make decisions for yourself  You can list the treatments you want for each condition  Treatment may include pain medicine, surgery, blood transfusions, dialysis, IV or tube feedings, and a ventilator (breathing machine)  · Values history: This document has questions about your views, beliefs, and how you feel and think about life  This information can help others choose the care that you would choose  Why are advance directives important? An advance directive helps you control your care  Although spoken wishes may be used, it is better to have your wishes written down  Spoken wishes can be misunderstood, or not followed  Treatments may be given even if you do not want them  An advance directive may make it easier for your family to make difficult choices about your care  Cigarette Smoking and Your Health   Risks to your health if you smoke:  Nicotine and other chemicals found in tobacco damage every cell in your body  Even if you are a light smoker, you have an increased risk for cancer, heart disease, and lung disease  If you are pregnant or have diabetes, smoking increases your risk for complications     Benefits to your health if you stop smoking:   · You decrease respiratory symptoms such as coughing, wheezing, and shortness of breath  · You reduce your risk for cancers of the lung, mouth, throat, kidney, bladder, pancreas, stomach, and cervix  If you already have cancer, you increase the benefits of chemotherapy  You also reduce your risk for cancer returning or a second cancer from developing  · You reduce your risk for heart disease, blood clots, heart attack, and stroke  · You reduce your risk for lung infections, and diseases such as pneumonia, asthma, chronic bronchitis, and emphysema  · Your circulation improves  More oxygen can be delivered to your body  If you have diabetes, you lower your risk for complications, such as kidney, artery, and eye diseases  You also lower your risk for nerve damage  Nerve damage can lead to amputations, poor vision, and blindness  · You improve your body's ability to heal and to fight infections  For more information and support to stop smoking:   · [facility]  Phone: 7- [phone]  Web Address: [employer]  Weight Management   Why it is important to manage your weight:  Being overweight increases your risk of health conditions such as heart disease, high blood pressure, type 2 diabetes, and certain types of cancer  It can also increase your risk for osteoarthritis, sleep apnea, and other respiratory problems  Aim for a slow, steady weight loss  Even a small amount of weight loss can lower your risk of health problems  How to lose weight safely:  A safe and healthy way to lose weight is to eat fewer calories and get regular exercise  You can lose up about 1 pound a week by decreasing the number of calories you eat by 500 calories each day  Healthy meal plan for weight management:  A healthy meal plan includes a variety of foods, contains fewer calories, and helps you stay healthy  A healthy meal plan includes the following:  · Eat whole-grain foods more often  A healthy meal plan should contain fiber   Fiber is the part of grains, fruits, and vegetables that is not broken down by your body  Whole-grain foods are healthy and provide extra fiber in your diet  Some examples of whole-grain foods are whole-wheat breads and pastas, oatmeal, brown rice, and bulgur  · Eat a variety of vegetables every day  Include dark, leafy greens such as spinach, kale, jose l greens, and mustard greens  Eat yellow and orange vegetables such as carrots, sweet potatoes, and winter squash  · Eat a variety of fruits every day  Choose fresh or canned fruit (canned in its own juice or light syrup) instead of juice  Fruit juice has very little or no fiber  · Eat low-fat dairy foods  Drink fat-free (skim) milk or 1% milk  Eat fat-free yogurt and low-fat cottage cheese  Try low-fat cheeses such as mozzarella and other reduced-fat cheeses  · Choose meat and other protein foods that are low in fat  Choose beans or other legumes such as split peas or lentils  Choose fish, skinless poultry (chicken or turkey), or lean cuts of red meat (beef or pork)  Before you cook meat or poultry, cut off any visible fat  · Use less fat and oil  Try baking foods instead of frying them  Add less fat, such as margarine, sour cream, regular salad dressing and mayonnaise to foods  Eat fewer high-fat foods  Some examples of high-fat foods include french fries, doughnuts, ice cream, and cakes  · Eat fewer sweets  Limit foods and drinks that are high in sugar  This includes candy, cookies, regular soda, and sweetened drinks  Exercise:  Exercise at least 30 minutes per day on most days of the week  Some examples of exercise include walking, biking, dancing, and swimming  You can also fit in more physical activity by taking the stairs instead of the elevator or parking farther away from stores  Ask your healthcare provider about the best exercise plan for you        © Copyright [address] 2018 Information is for End User's use only and may not be sold, redistributed or otherwise used for commercial purposes   All illustrations and images included in CareNotes® are the copyrighted property of A D A M , Inc  or [facility] Mecca Santos

## 2022-04-08 NOTE — PROGRESS NOTES
Depression Screening and Follow-up Plan: Patient was screened for depression during today's encounter  They screened negative with a PHQ-2 score of 0  Tobacco Cessation Counseling: Tobacco cessation counseling was provided  The patient is sincerely urged to quit consumption of tobacco  He is not ready to quit tobacco        Subjective:   Chief Complaint   Patient presents with    Medicare Wellness Visit        Patient ID: Rahat Lundberg is a 78 y o  male  Medical management-recent labs reviewed  Medications reviewed  1) hypertension-good control current medications  2) arthritis-mostly the knees  Using over-the-counter medication  3) prostate cancer-continues to be followed by urology  4) cigarette-nicotine addiction-1 pack per day  Not ready to quit as of yet  The following portions of the patient's history were reviewed and updated as appropriate: allergies, current medications, past family history, past medical history, past social history, past surgical history and problem list     Review of Systems   Constitutional: Negative for activity change, appetite change, diaphoresis and fatigue  HENT: Negative for congestion, rhinorrhea and sore throat  Respiratory: Negative for cough, chest tightness, shortness of breath and wheezing  Cardiovascular: Negative for chest pain, palpitations and leg swelling  Fast or slow heart rate   Gastrointestinal: Negative for abdominal pain, blood in stool, constipation, diarrhea, nausea and vomiting  Genitourinary: Negative for difficulty urinating, dysuria, frequency and hematuria  Musculoskeletal: Positive for arthralgias  Negative for gait problem, joint swelling and myalgias  Neurological: Negative for dizziness, light-headedness and headaches  Psychiatric/Behavioral: Negative for agitation, confusion, dysphoric mood and sleep disturbance  The patient is not nervous/anxious                Objective:  Vitals:    04/08/22 0751 BP: 124/80   Pulse: 80   SpO2: 97%   Weight: 83 9 kg (185 lb)   Height: 6' (1 829 m)      Physical Exam  Vitals reviewed  Constitutional:       General: He is not in acute distress  Appearance: He is well-developed  HENT:      Head: Normocephalic and atraumatic  Right Ear: Tympanic membrane and external ear normal  No drainage  Left Ear: Tympanic membrane normal  No drainage  Mouth/Throat:      Pharynx: No oropharyngeal exudate  Eyes:      General:         Right eye: No discharge  Left eye: No discharge  Conjunctiva/sclera: Conjunctivae normal    Neck:      Thyroid: No thyromegaly  Cardiovascular:      Rate and Rhythm: Normal rate and regular rhythm  Heart sounds: Normal heart sounds  Pulmonary:      Effort: Pulmonary effort is normal  No respiratory distress  Breath sounds: No wheezing or rales  Musculoskeletal:      Cervical back: Normal range of motion and neck supple  Right lower leg: No edema  Left lower leg: No edema  Comments: Knees-diffuse tenderness bilaterally  No effusion palpable  Good range of motion  Lymphadenopathy:      Cervical: No cervical adenopathy  Psychiatric:         Mood and Affect: Mood normal          Behavior: Behavior normal            Assessment/Plan:    Prostate cancer ([facility] [address] )  Continues to be care for by Urology  Diagnoses and all orders for this visit:    Medicare annual wellness visit, subsequent    Essential hypertension  -     amLODIPine (NORVASC) 5 mg tablet; Take 1 tablet (5 mg total) by mouth daily  -     Comprehensive metabolic panel; Future    Arthritis    Prostate cancer ([facility] [address] )    Cigarette nicotine dependence without complication    Colon cancer screening  -     Cologuard        Continue current high blood pressure medication  Complete Cologuard when kit arise  Consider Prevnar-20 vaccine in the future  COVID-19 booster 2-6 months

## 2022-04-08 NOTE — PROGRESS NOTES
Assessment and Plan:     Problem List Items Addressed This Visit     None        BMI Counseling: Body mass index is 25 09 kg/m²  The BMI is above normal  Nutrition recommendations include decreasing portion sizes and moderation in carbohydrate intake  No pharmacotherapy was ordered  Rationale for BMI follow-up plan is due to patient being overweight or obese  Depression Screening and Follow-up Plan: Patient was screened for depression during today's encounter  They screened negative with a PHQ-2 score of 0  Preventive health issues were discussed with patient, and age appropriate screening tests were ordered as noted in patient's After Visit Summary  Personalized health advice and appropriate referrals for health education or preventive services given if needed, as noted in patient's After Visit Summary       History of Present Illness:     Patient presents for Medicare Annual Wellness visit    Patient Care Team:  Peter Ewing MD as PCP - General (Family Medicine)  Peter Ewing MD (Family Medicine)     Problem List:     Patient Active Problem List   Diagnosis    Essential hypertension    Cigarette nicotine dependence without complication    Elevated PSA    Prostate cancer ([facility] [address] )    Smoking    Arthritis      Past Medical and Surgical History:     Past Medical History:   Diagnosis Date    Arthritis     both knees     Past Surgical History:   Procedure Laterality Date    CARPAL TUNNEL RELEASE  2015    Both wrists    GA PLACE RADIOTHER DEVICE/MARKER, PROSTATE N/A 7/29/2021    Procedure: INSERTION OF FIDUCIAL MARKER (TRANSRECTAL ULTRASOUND GUIDANCE), Parmjit Diaz;  Surgeon: Sagar Andrews MD;  Location: [facility];  Service: Urology    TONSILLECTOMY        Family History:     Family History   Problem Relation Age of Onset    Breast cancer Mother     Substance Abuse Neg Hx     Mental illness Neg Hx       Social History:     Social History     Socioeconomic History    Marital status: /Civil Union Spouse name: None    Number of children: None    Years of education: None    Highest education level: None   Occupational History    None   Tobacco Use    Smoking status: Current Every Day Smoker     Packs/day: 1 00     Years: 40 00     Pack years: 40 00     Types: Cigarettes    Smokeless tobacco: Never Used   Vaping Use    Vaping Use: Never used   Substance and Sexual Activity    Alcohol use: Yes    Drug use: Yes     Types: Marijuana     Comment: yest    Sexual activity: Yes     Partners: Female   Other Topics Concern    None   Social History Narrative    None     Social Determinants of Health     Financial Resource Strain: Not on file   Food Insecurity: Not on file   Transportation Needs: Not on file   Physical Activity: Not on file   Stress: Not on file   Social Connections: Not on file   Intimate Partner Violence: Not on file   Housing Stability: Not on file      Medications and Allergies:     Current Outpatient Medications   Medication Sig Dispense Refill    sildenafil (VIAGRA) 100 mg tablet Take 1 tablet (100 mg total) by mouth daily as needed for erectile dysfunction 6 tablet 6    amLODIPine (NORVASC) 5 mg tablet Take 1 tablet (5 mg total) by mouth daily 30 tablet 0     No current facility-administered medications for this visit       No Known Allergies   Immunizations:     Immunization History   Administered Date(s) Administered    COVID-19 PFIZER VACCINE 0 3 ML IM 03/25/2021, 04/15/2021, 01/22/2022    INFLUENZA 11/05/2018    Influenza, high dose seasonal 0 7 mL 10/23/2020, 10/14/2021    Pneumococcal Conjugate 13-Valent 10/23/2020      Health Maintenance:         Topic Date Due    Hepatitis C Screening  Never done    Lung Cancer Screening  Never done    Colorectal Cancer Screening  Never done         Topic Date Due    DTaP,Tdap,and Td Vaccines (1 - Tdap) Never done    Pneumococcal Vaccine: 65+ Years (1 of 2 - PPSV23) 12/18/2020      Medicare Health Risk Assessment:     /80 Pulse 80   Ht 6' (1 829 m)   Wt 83 9 kg (185 lb)   SpO2 97%   BMI 25 09 kg/m²      Nataliia Jack is here for his Initial Wellness and Subsequent Wellness visit  Last Medicare Wellness visit information reviewed, patient interviewed, no change since last AWV  Health Risk Assessment:   Patient rates overall health as very good  Patient feels that their physical health rating is same  Eyesight was rated as same  Hearing was rated as same  Patient feels that their emotional and mental health rating is same  Patients states they are never, rarely angry  Patient states they are never, rarely unusually tired/fatigued  Pain experienced in the last 7 days has been some  Patient's pain rating has been 4/10  Patient states that he has experienced no weight loss or gain in last 6 months  Knee pain and wrist - arthritis pain    Depression Screening:   PHQ-2 Score: 0      Fall Risk Screening: In the past year, patient has experienced: no history of falling in past year      Home Safety:  Patient does not have trouble with stairs inside or outside of their home  Patient has working smoke alarms and has no working carbon monoxide detector  Home safety hazards include: none  Nutrition:   Current diet is Regular  Medications:   Patient is not currently taking any over-the-counter supplements  Patient is able to manage medications  Activities of Daily Living (ADLs)/Instrumental Activities of Daily Living (IADLs):   Walk and transfer into and out of bed and chair?: Yes  Dress and groom yourself?: Yes    Bathe or shower yourself?: Yes    Feed yourself?  Yes  Do your laundry/housekeeping?: Yes  Manage your money, pay your bills and track your expenses?: Yes  Make your own meals?: Yes    Do your own shopping?: Yes    Previous Hospitalizations:   Any hospitalizations or ED visits within the last 12 months?: No      Advance Care Planning:   Living will: No    Durable POA for healthcare: No    Advanced directive: No    Five wishes given: Yes      Cognitive Screening:   Provider or family/friend/caregiver concerned regarding cognition?: No    PREVENTIVE SCREENINGS      Cardiovascular Screening:    General: Screening Current    Due for: Lipid Panel      Diabetes Screening:     General: Screening Current    Due for: Blood Glucose      Colorectal Cancer Screening:       Due for: Cologuard      Prostate Cancer Screening:    General: History Prostate Cancer    Due for: PSA      Osteoporosis Screening:    General: Screening Not Indicated      Abdominal Aortic Aneurysm (AAA) Screening:    Risk factors include: age between 73-69 yo and tobacco use        General: Screening Current      Lung Cancer Screening:     General: Screening Not Indicated and Risks and Benefits Discussed      Preventive Screening Comments: CT abd May, 2021- No AAA    Screening, Brief Intervention, and Referral to Treatment (SBIRT)    Screening  Typical number of drinks in a day: 0  Typical number of drinks in a week: 0  Interpretation: Low risk drinking behavior  AUDIT-C Screenin) How often did you have a drink containing alcohol in the past year? never  2) How many drinks did you have on a typical day when you were drinking in the past year? 0  3) How often did you have 6 or more drinks on one occasion in the past year? never    AUDIT-C Score: 0  Interpretation: Score 0-3 (male): Negative screen for alcohol misuse    Single Item Drug Screening:  How often have you used an illegal drug (including marijuana) or a prescription medication for non-medical reasons in the past year? never    Single Item Drug Screen Score: 0  Interpretation: Negative screen for possible drug use disorder    Brief Intervention  Alcohol & drug use screenings were reviewed  No concerns regarding substance use disorder identified  Healthy alcohol use/limits discussed  Other Counseling Topics:   Car/seat belt/driving safety and regular weightbearing exercise         Argenis Deluna MD

## 2022-04-19 ENCOUNTER — OFFICE VISIT (OUTPATIENT)
Dept: UROLOGY | Facility: HOSPITAL | Age: 67
End: 2022-04-19
Payer: MEDICARE

## 2022-04-19 VITALS
OXYGEN SATURATION: 97 % | SYSTOLIC BLOOD PRESSURE: 128 MMHG | HEIGHT: 72 IN | DIASTOLIC BLOOD PRESSURE: 78 MMHG | BODY MASS INDEX: 24.79 KG/M2 | HEART RATE: 104 BPM | WEIGHT: 183 LBS

## 2022-04-19 DIAGNOSIS — C61 PROSTATE CANCER (HCC): Primary | ICD-10-CM

## 2022-04-19 PROCEDURE — 99213 OFFICE O/P EST LOW 20 MIN: CPT | Performed by: NURSE PRACTITIONER

## 2022-04-19 NOTE — PROGRESS NOTES
04/19/22    [facility]   1955   [id number]     Assessment  1 Conrad 7 prostate cancer s/p radiation therapy (10/2021)     Discussion/Plan  1 Conrad 7 prostate cancer s/p radiation therapy (10/2021)              12/1/21 PSA 2 8   PSA due now   PSA in 3 months   Continue to follow with Radiation Oncology 6/6/22     Patient will obtain PSA this week and again in 3 months  All questions answered at this time  Subjective  HPI   Conception Manuela is a 78year old male known to Dr Lisy Fenton who presents in follow up  He has history of Conrad 7 prostate cancer  He had his first prostate biopsy on 4/28/2021 which was positive in 6/12 cores bilaterally for Conrad score 4+3=7 disease in 3 cores with the other 3 cores showing 3+4=7 disease  His bone scan and CT were negative for metastatic disease  He elected to pursue radiation therapy and he was referred to Radiation Oncology  He had fiducial markers and SpaceOAR placed in July 2021 by Dr Susan Suh  He was diagnosed when his PSA was 10 7  He is a current 1 ppd smoker x 40 years  He takes 50 mg Sildenafil as needed for erectile dysfunction  He denies lower urinary tract symptoms, pain, or gross hematuria  He completed Radiation in October 2021  He is accompanied by his wife      Component      Latest Ref Rng & Units 12/8/2020 3/12/2021 9/3/2021 12/1/2021           9:05 AM  8:51 AM 12:06 PM 11:38 AM   PSA, Total      0 0 - 4 0 ng/mL 8 0 (H) 10 7 (H) 9 1 (H) 2 8       Review of Systems - History obtained from chart review and the patient  General ROS: negative  Psychological ROS: negative  Endocrine ROS: negative  Breast ROS: negative for breast lumps  Respiratory ROS: no cough, shortness of breath, or wheezing  Cardiovascular ROS: no chest pain or dyspnea on exertion  Gastrointestinal ROS: no abdominal pain, change in bowel habits, or black or bloody stools  Genito-Urinary ROS: no dysuria, trouble voiding, or hematuria  Musculoskeletal ROS: negative  Neurological ROS: no TIA or stroke symptoms  Dermatological ROS: negative       Objective  Physical Exam  Vitals and nursing note reviewed  Constitutional:       General: He is awake  He is not in acute distress  Appearance: Normal appearance  He is well-developed, well-groomed and normal weight  He is not ill-appearing, toxic-appearing or diaphoretic  Pulmonary:      Effort: Pulmonary effort is normal    Abdominal:      Tenderness: There is no right CVA tenderness or left CVA tenderness  Musculoskeletal:         General: Normal range of motion  Cervical back: Normal range of motion and neck supple  Skin:     General: Skin is warm  Neurological:      General: No focal deficit present  Mental Status: He is alert and oriented to person, place, and time  Mental status is at baseline  Psychiatric:         Attention and Perception: Attention normal          Mood and Affect: Mood normal          Speech: Speech normal          Behavior: Behavior normal  Behavior is cooperative  Thought Content: Thought content normal          Cognition and Memory: Cognition normal          Judgment: Judgment normal              SEGUN Alarcon     I have spent 15 minutes with Patient and family today in which greater than 50% of this time was spent in counseling/coordination of care regarding Diagnostic results

## 2022-04-21 ENCOUNTER — LAB (OUTPATIENT)
Dept: LAB | Facility: HOSPITAL | Age: 67
End: 2022-04-21
Payer: MEDICARE

## 2022-04-21 DIAGNOSIS — C61 PROSTATE CANCER (HCC): ICD-10-CM

## 2022-04-21 DIAGNOSIS — I10 ESSENTIAL HYPERTENSION: ICD-10-CM

## 2022-04-21 LAB
ALBUMIN SERPL BCP-MCNC: 4 G/DL (ref 3.5–5)
ALP SERPL-CCNC: 54 U/L (ref 46–116)
ALT SERPL W P-5'-P-CCNC: 17 U/L (ref 12–78)
ANION GAP SERPL CALCULATED.3IONS-SCNC: 4 MMOL/L (ref 4–13)
AST SERPL W P-5'-P-CCNC: 24 U/L (ref 5–45)
BILIRUB SERPL-MCNC: 1.03 MG/DL (ref 0.2–1)
BUN SERPL-MCNC: 10 MG/DL (ref 5–25)
CALCIUM SERPL-MCNC: 9.8 MG/DL (ref 8.3–10.1)
CHLORIDE SERPL-SCNC: 108 MMOL/L (ref 100–108)
CO2 SERPL-SCNC: 27 MMOL/L (ref 21–32)
CREAT SERPL-MCNC: 1.1 MG/DL (ref 0.6–1.3)
GFR SERPL CREATININE-BSD FRML MDRD: 69 ML/MIN/1.73SQ M
GLUCOSE P FAST SERPL-MCNC: 115 MG/DL (ref 65–99)
POTASSIUM SERPL-SCNC: 3.7 MMOL/L (ref 3.5–5.3)
PROT SERPL-MCNC: 7.9 G/DL (ref 6.4–8.2)
PSA SERPL-MCNC: 1 NG/ML (ref 0–4)
SODIUM SERPL-SCNC: 139 MMOL/L (ref 136–145)

## 2022-04-21 PROCEDURE — 84153 ASSAY OF PSA TOTAL: CPT

## 2022-04-21 PROCEDURE — 80053 COMPREHEN METABOLIC PANEL: CPT

## 2022-04-21 PROCEDURE — 36415 COLL VENOUS BLD VENIPUNCTURE: CPT

## 2022-04-21 NOTE — RESULT ENCOUNTER NOTE
Call patient with lab result-slightly high fasting sugar, would advised repeating fasting sugar with A1c in 6-8 weeks  Should try to intensify his diet, rest of the labs look okay  PSA is down

## 2022-04-22 ENCOUNTER — TELEPHONE (OUTPATIENT)
Dept: FAMILY MEDICINE CLINIC | Facility: CLINIC | Age: 67
End: 2022-04-22

## 2022-04-22 NOTE — TELEPHONE ENCOUNTER
----- Message from Grayson Banks MD sent at 4/21/2022  4:26 PM EDT -----  Call patient with lab result-slightly high fasting sugar, would advised repeating fasting sugar with A1c in 6-8 weeks  Should try to intensify his diet, rest of the labs look okay  PSA is down

## 2022-05-07 LAB — COLOGUARD RESULT REPORTABLE: POSITIVE

## 2022-05-09 ENCOUNTER — TELEPHONE (OUTPATIENT)
Dept: FAMILY MEDICINE CLINIC | Facility: CLINIC | Age: 67
End: 2022-05-09

## 2022-05-09 NOTE — TELEPHONE ENCOUNTER
Pt's wife accepted phone call -    Pt is aware of the test results, and is advised to call Simi ROSENTHAL to follow-up with a colonoscopy

## 2022-05-09 NOTE — TELEPHONE ENCOUNTER
----- Message from Ian Sands MD sent at 5/9/2022  6:57 AM EDT -----  Call patient with lab result-Cologuard is positive, should be referred for colonoscopy

## 2022-05-10 ENCOUNTER — TELEPHONE (OUTPATIENT)
Dept: GASTROENTEROLOGY | Facility: CLINIC | Age: 67
End: 2022-05-10

## 2022-05-10 NOTE — TELEPHONE ENCOUNTER
05/10/22  Screened by: Rowan Jack    Referring Provider   Pre- Screening: There is no height or weight on file to calculate BMI  Has patient been referred for a routine screening Colonoscopy? no  Is the patient between 39-70 years old? yes      Previous Colonoscopy no   If yes:    Date:     Facility:     Reason:       SCHEDULING STAFF: If the patient is between 45yrs-49yrs, please advise patient to confirm benefits/coverage with their insurance company for a routine screening colonoscopy, some insurance carriers will only cover at [id number] or older  If the patient is over 66years old, please schedule an office visit  Does the patient want to see a Gastroenterologist prior to their procedure OR are they having any GI symptoms? no    Has the patient been hospitalized or had abdominal surgery in the past 6 months? no    Does the patient use supplemental oxygen? no    Does the patient take Coumadin, Lovenox, Plavix, Elliquis, Xarelto, or other blood thinning medication? no    Has the patient had a stroke, cardiac event, or stent placed in the past year? no    SCHEDULING STAFF: If patient answers NO to above questions, then schedule procedure  If patient answers YES to above questions, then schedule office appointment  If patient is between 45yrs - 49yrs, please advise patient that we will have to confirm benefits & coverage with their insurance company for a routine screening colonoscopy

## 2022-06-01 ENCOUNTER — TELEPHONE (OUTPATIENT)
Dept: GASTROENTEROLOGY | Facility: CLINIC | Age: 67
End: 2022-06-01

## 2022-06-01 DIAGNOSIS — Z12.11 SCREENING FOR COLON CANCER: Primary | ICD-10-CM

## 2022-06-02 NOTE — TELEPHONE ENCOUNTER
Scheduled date of colonoscopy (as of today): 6/8/22  Physician performing colonoscopy: Dr Jamal Olmos  Location of colonoscopy: [facility]  Bowel prep reviewed with patient: tucker  Instructions reviewed with patient by: Hai Mayberry  Clearances: none

## 2022-06-03 ENCOUNTER — APPOINTMENT (OUTPATIENT)
Dept: LAB | Facility: HOSPITAL | Age: 67
End: 2022-06-03
Attending: RADIOLOGY
Payer: MEDICARE

## 2022-06-03 DIAGNOSIS — C61 PROSTATE CANCER (HCC): ICD-10-CM

## 2022-06-03 LAB — PSA SERPL-MCNC: 1 NG/ML (ref 0–4)

## 2022-06-03 PROCEDURE — 84153 ASSAY OF PSA TOTAL: CPT

## 2022-06-06 ENCOUNTER — RADIATION ONCOLOGY FOLLOW-UP (OUTPATIENT)
Dept: RADIATION ONCOLOGY | Facility: CLINIC | Age: 67
End: 2022-06-06
Attending: RADIOLOGY
Payer: MEDICARE

## 2022-06-06 VITALS
DIASTOLIC BLOOD PRESSURE: 91 MMHG | SYSTOLIC BLOOD PRESSURE: 124 MMHG | WEIGHT: 174.82 LBS | RESPIRATION RATE: 16 BRPM | TEMPERATURE: 98.7 F | HEART RATE: 126 BPM | BODY MASS INDEX: 23.71 KG/M2 | OXYGEN SATURATION: 96 %

## 2022-06-06 DIAGNOSIS — C61 PROSTATE CANCER (HCC): Primary | ICD-10-CM

## 2022-06-06 PROCEDURE — 99213 OFFICE O/P EST LOW 20 MIN: CPT | Performed by: RADIOLOGY

## 2022-06-06 PROCEDURE — 99211 OFF/OP EST MAY X REQ PHY/QHP: CPT | Performed by: RADIOLOGY

## 2022-06-06 NOTE — PROGRESS NOTES
Obed Cooney 1955 is a 78 y o  male History of Rachel 7 (4+3) prostate cancer  Completed EBRT to prostate/proximal SV on 10/27/21  Last visit was 21 via telemed  Today's visit is a follow-up        22  Urology  Denies lower urinary tract symptoms  Needs PSA now and in 3 months    Component PSA, Total   Latest Ref Rng & Units 0 0 - 4 0 ng/mL   2021 2 8   2022 1 0   6/3/2022 1 0     Upcomin22  Urology    Follow up visit     Oncology History Overview Note   History of Rachel 7 (4+3) prostate cancer  Completed EBRT to prostate/proximal SV on 10/27/21  Last visit was 21 via telemed  Today's visit is a follow-up         22  Urology  Denies lower urinary tract symptoms  Needs PSA now and in 3 months    Upcomin22  Urology             Prostate cancer [facility])   2021 Initial Diagnosis    Prostate cancer ([facility] [address] )     2021 Biopsy    Final Diagnosis   A  Prostate, right lateral base:  - Prostatic adenocarcinoma, [place] score 3 + 4 = 7, Prognostic Grade Group 2, discontinuously involving 7% of one core:  - see Note  * tumor is 10% grade 4   * periprostatic fat invasion:  not identified  * lymph-vascular invasion:  not identified  * perineural invasion:  not identified   - Additional pathologic findings:  N/A     B  Prostate, right lateral mid:  - Atypical small acinar proliferation in a single focus, < 5% of one core, suspicious for low grade prostatic adenocarcinoma - see Note  C  Prostate, right lateral apex x 2:  - Prostatic adenocarcinoma, [place] score 4 + 3 = 7, Prognostic Grade Group 3, discontinuously involving 23% of one of two cores:  - see Note  * tumor is 60% grade 4, with focal cribriform morphology   * periprostatic fat invasion:  not identified  * lymph-vascular invasion:  not identified  * perineural invasion:  not identified   - Additional pathologic findings:  N/A     D   Prostate, left lateral base:  - Prostatic adenocarcinoma, Rachel score 4 + 3 = 7, Prognostic Grade Group 3, discontinuously involving 35% of one core:  - see Note  * tumor is 70% grade 4, with focal cribriform morphology   * periprostatic fat invasion:  not identified  * lymph-vascular invasion:  not identified  * perineural invasion:  not identified   - Additional pathologic findings:  N/A     Note: Block D1 is suggested if additional studies are indicated (at least 0 5 mm of tumor for Prolaris)      E  Prostate, left lateral mid:  - Atypical small acinar proliferation in a single focus, < 5% of one core, suspicious for low grade prostatic adenocarcinoma - see Note  F  Prostate, left lateral apex:  - Benign prostate tissue  G  Prostate, left base:  - Prostatic adenocarcinoma, [place] score 3 + 4 = 7, Prognostic Grade Group 2, continuously involving 5% of one core:   * tumor is 20% grade 4   * periprostatic fat invasion:  not identified  * lymph-vascular invasion:  not identified  * perineural invasion:  focally present  - Additional pathologic findings:  N/A     H  Prostate, left mid:  - High grade prostatic intraepithelial neoplasia - see Note        I  Prostate, left apex:  - Benign prostate tissue  J  Prostate, right base:  - Benign prostate tissue  K  Prostate, right mid:  - Prostatic adenocarcinoma, [place] score 4 + 3 = 7, Prognostic Grade Group 3, continuously involving 19% of one core:   * tumor is 90% grade 4 and displays focal cribriform morphology   * periprostatic fat invasion:  not identified  * lymph-vascular invasion:  not identified  * perineural invasion:  not identified   - Additional pathologic findings:  N/A     L  Prostate, right apex:  - Prostatic adenocarcinoma, [place] score 3 + 4 = 7, Prognostic Grade Group 2, discontinuously involving 19% of one core - see Note  * tumor is 40% grade 4  * periprostatic fat invasion:  not identified  * lymph-vascular invasion:  not identified     * perineural invasion:  focally present  - Additional pathologic findings:  N/A        6/3/2021 -  Cancer Staged    Staging form: Prostate, AJCC 8th Edition  - Clinical stage from 6/3/2021: Stage IIC (cT2a, cN0, cM0, PSA: 10 7, Grade Group: 3) - Signed by Joanne Ayala MD on 6/3/2021  Histopathologic type: Adenocarcinoma, NOS  Prostate specific antigen (PSA) range: 10 to 19  Rachel primary pattern: 4  Rachel secondary pattern: 3  [place] score: 7  Histologic grading system: 5 grade system  Location of positive needle core biopsies: Both sides  Stage used in treatment planning: Yes  National guidelines used in treatment planning: Yes       8/23/2021 - 10/27/2021 Radiation    Prost ProxSV 6X 44 / 44 180 0 7,920 65      Treatment dates:  C1: 8/23/2021 - 10/27/2021         Review of Systems:  Review of Systems   Constitutional: Positive for appetite change and unexpected weight change  Negative for activity change and fatigue  HENT: Positive for sneezing  Eyes: Negative  Reading glasses   Respiratory: Positive for cough (occasional productive ccough when sneezing)  Cardiovascular: Negative  Gastrointestinal: Negative  Genitourinary: Positive for frequency and urgency  Nocturia x1   Musculoskeletal: Positive for arthralgias (bilaterasl wrist and knee pain)  Allergic/Immunologic: Positive for environmental allergies  Neurological: Negative  Hematological: Negative  Psychiatric/Behavioral: Positive for sleep disturbance  Clinical Trial: no    IPSS Questionnaire (AUA-7): Over the past month    1)  How often have you had a sensation of not emptying your bladder completely after you finish urinating? 1 - Less than 1 time in 5   2)  How often have you had to urinate again less than two hours after you finished urinating? 1 - Less than 1 time in 5   3)  How often have you found you stopped and started again several times when you urinated?   3 - About half the time   4) How difficult have you found it to postpone urination? 3 - About half the time   5) How often have you had a weak urinary stream?  3 - About half the time   6) How often have you had to push or strain to begin urination? 0 - Not at all   7) How many times did you most typically get up to urinate from the time you went to bed until the time you got up in the morning?   1 - 1 time   Total Score:  12       Teaching    Covid Vaccine Status Vaccinated x3    Health Maintenance   Topic Date Due    Hepatitis C Screening  Never done    DTaP,Tdap,and Td Vaccines (1 - Tdap) Never done    Lung Cancer Screening  Never done    Pneumococcal Vaccine: 65+ Years (2 - PPSV23 or PCV20) 10/23/2021    COVID-19 Vaccine (4 - Booster for Ni Peter series) 04/22/2022    Fall Risk  04/08/2023    Depression Screening  04/08/2023    Medicare Annual Wellness Visit (AWV)  04/08/2023    BMI: Adult  04/19/2023    Colorectal Cancer Screening  04/28/2025    Influenza Vaccine  Completed    HIB Vaccine  Aged Out    Hepatitis B Vaccine  Aged Out    IPV Vaccine  Aged Out    Hepatitis A Vaccine  Aged Out    Meningococcal ACWY Vaccine  Aged Out    HPV Vaccine  Aged Out     Patient Active Problem List   Diagnosis    Essential hypertension    Cigarette nicotine dependence without complication    Elevated PSA    Prostate cancer ([facility] [address] )    Smoking    Arthritis     Past Medical History:   Diagnosis Date    Arthritis     both knees     Past Surgical History:   Procedure Laterality Date    CARPAL TUNNEL RELEASE  2015    Both wrists    [place] PLACE RADIOTHER DEVICE/MARKER, PROSTATE N/A 7/29/2021    Procedure: INSERTION OF FIDUCIAL MARKER (TRANSRECTAL ULTRASOUND GUIDANCE), Randy Baer;  Surgeon: Ada Cain MD;  [facility];  Service: Urology    TONSILLECTOMY       Family History   Problem Relation Age of Onset    Breast cancer Mother     Substance Abuse Neg Hx     Mental illness Neg Hx      Social History     Socioeconomic History    Marital status: /Civil Union     Spouse name: Not on file    Number of children: Not on file    Years of education: Not on file    Highest education level: Not on file   Occupational History    Not on file   Tobacco Use    Smoking status: Current Every Day Smoker     Packs/day: 1 00     Years: 40 00     Pack years: 40 00     Types: Cigarettes    Smokeless tobacco: Never Used   Vaping Use    Vaping Use: Never used   Substance and Sexual Activity    Alcohol use: Yes    Drug use: Yes     Types: Marijuana     Comment: yest    Sexual activity: Yes     Partners: Female   Other Topics Concern    Not on file   Social History Narrative    Not on file     Social Determinants of Health     Financial Resource Strain: Not on file   Food Insecurity: Not on file   Transportation Needs: Not on file   Physical Activity: Not on file   Stress: Not on file   Social Connections: Not on file   Intimate Partner Violence: Not on file   Housing Stability: Not on file       Current Outpatient Medications:     amLODIPine (NORVASC) 5 mg tablet, Take 1 tablet (5 mg total) by mouth daily, Disp: 90 tablet, Rfl: 3    polyethylene glycol (GOLYTELY) 4000 mL solution, Take 4,000 mL by mouth once for 1 dose, Disp: 4000 mL, Rfl: 0    sildenafil (VIAGRA) 100 mg tablet, Take 1 tablet (100 mg total) by mouth daily as needed for erectile dysfunction, Disp: 6 tablet, Rfl: 6  No Known Allergies  There were no vitals filed for this visit

## 2022-06-06 NOTE — PROGRESS NOTES
Follow-up - Radiation Oncology   [facility] 1955 78 y o  male [id number]      History of Present Illness   Cancer Staging  Prostate cancer [facility])  Staging form: Prostate, AJCC 8th Edition  - Clinical stage from 6/3/2021: Stage IIC (cT2a, cN0, cM0, PSA: 10 7, Grade Group: 3) - Signed by Amarilis Donato MD on 6/3/2021  Histopathologic type: Adenocarcinoma, NOS  Prostate specific antigen (PSA) range: 10 to 19  [place] primary pattern: 4  [place] secondary pattern: 3  [place] score: 7  Histologic grading system: 5 grade system  Location of positive needle core biopsies: Both sides  Stage used in treatment planning: Yes  National guidelines used in treatment planning: Yes      THE [facility] is a 78y o  year old male with a history of Reseda 7 (4+3) prostate cancer  Completed EBRT to prostate/proximal SV on 10/27/21  Last visit was 21 via [employer]  Today's visit is a follow-up  Interval History:   22  Urology  Denies lower urinary tract symptoms  Needs PSA now and in 3 months     Component PSA, Total   Latest Ref Rng & Units 0 0 - 4 0 ng/mL   2021 2 8   2022 1 0   6/3/2022 1 0      Patient seen today with his wife  Reports he is feeling well  He has stable nocturia x1 with a weak stream at times  He is having no hematuria nor dysuria  He reports normal bowel movements without any diarrhea  He has no blood in his bowel movements  He denies any hemorrhoids  He has a positive Cologuard test on 2022 and is scheduled for colonoscopy on 2022  He has lost about 10 lb since April and reports he is eating less  He has some erectile dysfunction that is relieved with the use of Viagra 50 mg  He has received COVID booster vaccine  Upcomin22  Urology    Historical Information   Oncology History Overview Note   History of Reseda 7 (4+3) prostate cancer  Completed EBRT to prostate/proximal SV on 10/27/21  Last visit was 21 via [employer]  Today's visit is a follow-up         22  Urology  Denies lower urinary tract symptoms  Needs PSA now and in 3 months    Upcomin22  Urology             Prostate cancer [facility])   2021 Initial Diagnosis    Prostate cancer ([facility] [address] )     2021 Biopsy    Final Diagnosis   A  Prostate, right lateral base:  - Prostatic adenocarcinoma, Rachel score 3 + 4 = 7, Prognostic Grade Group 2, discontinuously involving 7% of one core:  - see Note  * tumor is 10% grade 4   * periprostatic fat invasion:  not identified  * lymph-vascular invasion:  not identified  * perineural invasion:  not identified   - Additional pathologic findings:  N/A     B  Prostate, right lateral mid:  - Atypical small acinar proliferation in a single focus, < 5% of one core, suspicious for low grade prostatic adenocarcinoma - see Note  C  Prostate, right lateral apex x 2:  - Prostatic adenocarcinoma, [place] score 4 + 3 = 7, Prognostic Grade Group 3, discontinuously involving 23% of one of two cores:  - see Note  * tumor is 60% grade 4, with focal cribriform morphology   * periprostatic fat invasion:  not identified  * lymph-vascular invasion:  not identified  * perineural invasion:  not identified   - Additional pathologic findings:  N/A     D  Prostate, left lateral base:  - Prostatic adenocarcinoma, Rachel score 4 + 3 = 7, Prognostic Grade Group 3, discontinuously involving 35% of one core:  - see Note  * tumor is 70% grade 4, with focal cribriform morphology   * periprostatic fat invasion:  not identified  * lymph-vascular invasion:  not identified  * perineural invasion:  not identified   - Additional pathologic findings:  N/A     Note: Block D1 is suggested if additional studies are indicated (at least 0 5 mm of tumor for Prolaris)      E  Prostate, left lateral mid:  - Atypical small acinar proliferation in a single focus, < 5% of one core, suspicious for low grade prostatic adenocarcinoma - see Note       F  Prostate, left lateral apex:  - Benign prostate tissue  G  Prostate, left base:  - Prostatic adenocarcinoma, [place] score 3 + 4 = 7, Prognostic Grade Group 2, continuously involving 5% of one core:   * tumor is 20% grade 4   * periprostatic fat invasion:  not identified  * lymph-vascular invasion:  not identified  * perineural invasion:  focally present  - Additional pathologic findings:  N/A     H  Prostate, left mid:  - High grade prostatic intraepithelial neoplasia - see Note        I  Prostate, left apex:  - Benign prostate tissue  J  Prostate, right base:  - Benign prostate tissue  K  Prostate, right mid:  - Prostatic adenocarcinoma, [place] score 4 + 3 = 7, Prognostic Grade Group 3, continuously involving 19% of one core:   * tumor is 90% grade 4 and displays focal cribriform morphology   * periprostatic fat invasion:  not identified  * lymph-vascular invasion:  not identified  * perineural invasion:  not identified   - Additional pathologic findings:  N/A     L  Prostate, right apex:  - Prostatic adenocarcinoma, [place] score 3 + 4 = 7, Prognostic Grade Group 2, discontinuously involving 19% of one core - see Note  * tumor is 40% grade 4  * periprostatic fat invasion:  not identified  * lymph-vascular invasion:  not identified  * perineural invasion:  focally present  - Additional pathologic findings:  N/A        6/3/2021 -  Cancer Staged    Staging form: Prostate, AJCC 8th Edition  - Clinical stage from 6/3/2021: Stage IIC (cT2a, cN0, cM0, PSA: 10 7, Grade Group: 3) - Signed by Elizabeth Adams MD on 6/3/2021  Histopathologic type:  Adenocarcinoma, NOS  Prostate specific antigen (PSA) range: 10 to 19  [place] primary pattern: 4  Rachel secondary pattern: 3  [place] score: 7  Histologic grading system: 5 grade system  Location of positive needle core biopsies: Both sides  Stage used in treatment planning: Yes  National guidelines used in treatment planning: Yes       8/23/2021 - 10/27/2021 Radiation    Prost ProxSV 6X 44 / 40 180 0 7,920 65      Treatment dates:  C1: 8/23/2021 - 10/27/2021         Past Medical History:   Diagnosis Date    Arthritis     both knees    Prostate cancer [facility])      Past Surgical History:   Procedure Laterality Date    CARPAL TUNNEL RELEASE  2015    Both wrists    [place] RADIOTHER DEVICE/MARKER, PROSTATE N/A 7/29/2021    Procedure: INSERTION OF FIDUCIAL MARKER (TRANSRECTAL ULTRASOUND GUIDANCE), SPACEOAR;  Surgeon: Brain Renee MD;  [facility];  Service: Urology    TONSILLECTOMY         Social History   Social History     Substance and Sexual Activity   Alcohol Use Yes    Comment: drinks fifth of alcohol during week     Social History     Substance and Sexual Activity   Drug Use Yes    Types: Marijuana    Comment: daily     Social History     Tobacco Use   Smoking Status Current Every Day Smoker    Packs/day: 1 00    Years: 40 00    Pack years: 40 00    Types: Cigarettes   Smokeless Tobacco Never Used     Meds/Allergies     Current Outpatient Medications:     amLODIPine (NORVASC) 5 mg tablet, Take 1 tablet (5 mg total) by mouth daily, Disp: 90 tablet, Rfl: 3    sildenafil (VIAGRA) 100 mg tablet, Take 1 tablet (100 mg total) by mouth daily as needed for erectile dysfunction, Disp: 6 tablet, Rfl: 6    polyethylene glycol (GOLYTELY) 4000 mL solution, Take 4,000 mL by mouth once for 1 dose, Disp: 4000 mL, Rfl: 0  No Known Allergies    Review of Systems  Constitutional: Positive for appetite change and unexpected weight change  Negative for activity change and fatigue  HENT: Positive for sneezing  Eyes: Negative  Reading glasses   Respiratory: Positive for cough (occasional productive ccough when sneezing)  Cardiovascular: Negative  Gastrointestinal: Negative  Genitourinary: Positive for frequency and urgency  Nocturia x1   Musculoskeletal: Positive for arthralgias (bilaterasl wrist and knee pain)     Allergic/Immunologic: Positive for environmental allergies  Neurological: Negative  Hematological: Negative  Psychiatric/Behavioral: Positive for sleep disturbance  Clinical Trial: no   IPSS Questionnaire (AUA-7): Over the past month    1) How often have you had a sensation of not emptying your bladder completely after you finish urinating? 1 - Less than 1 time in 5   2) How often have you had to urinate again less than two hours after you finished urinating? 1 - Less than 1 time in 5   3) How often have you found you stopped and started again several times when you urinated? 3 - About half the time   4) How difficult have you found it to postpone urination? 3 - About half the time   5) How often have you had a weak urinary stream?  3 - About half the time   6) How often have you had to push or strain to begin urination? 0 - Not at all   7) How many times did you most typically get up to urinate from the time you went to bed until the time you got up in the morning? 1 - 1 time   Total Score: 12     OBJECTIVE:   /91 (BP Location: Left arm, Patient Position: Sitting, Cuff Size: Large)   Pulse (!) 126   Temp 98 7 °F (37 1 °C) (Temporal)   Resp 16   Wt 79 3 kg (174 lb 13 2 oz)   SpO2 96%   BMI 23 71 kg/m²   Pain Assessment:  0  ECOG/Zubrod/WHO: 1 - Symptomatic but completely ambulatory    Physical Exam  Vitals and nursing note reviewed  Constitutional:       General: He is not in acute distress  Appearance: He is well-developed  He is not diaphoretic  HENT:      Head: Normocephalic and atraumatic  Mouth/Throat:      Pharynx: No oropharyngeal exudate  Eyes:      General: No scleral icterus  Conjunctiva/sclera: Conjunctivae normal       Pupils: Pupils are equal, round, and reactive to light  Neck:      Thyroid: No thyromegaly  Trachea: No tracheal deviation  Cardiovascular:      Rate and Rhythm: Normal rate and regular rhythm  Heart sounds: Normal heart sounds     Pulmonary:      Effort: Pulmonary effort is normal  No respiratory distress  Breath sounds: Normal breath sounds  No stridor  No wheezing, rhonchi or rales  Chest:      Chest wall: No tenderness  Abdominal:      General: Bowel sounds are normal  There is no distension  Palpations: Abdomen is soft  There is no mass  Tenderness: There is no abdominal tenderness  Genitourinary:     Comments: Rectal exam differed  Musculoskeletal:         General: No swelling or tenderness  Normal range of motion  Cervical back: Normal range of motion and neck supple  Lymphadenopathy:      Cervical: No cervical adenopathy  Skin:     General: Skin is warm and dry  Coloration: Skin is not jaundiced or pale  Findings: No erythema or rash  Neurological:      General: No focal deficit present  Mental Status: He is alert and oriented to person, place, and time  Cranial Nerves: No cranial nerve deficit  Coordination: Coordination normal    Psychiatric:         Mood and Affect: Mood normal          Behavior: Behavior normal          Thought Content: Thought content normal          Judgment: Judgment normal         RESULTS    Lab Results:   Recent Results (from the past 672 hour(s))   PSA Total, Diagnostic    Collection Time: 06/03/22  8:43 AM   Result Value Ref Range    PSA, Diagnostic 1 0 0 0 - 4 0 ng/mL       Imaging Studies:No results found          Assessment/Plan:  Orders Placed This Encounter   Procedures    PSA Total, Diagnostic        Car Hardwick is a 78y o  year old male with a stage IIC [place] score 4+3=7 prostate adenocarcinoma diagnosed in the elevated PSA 10 7 NG/mL  [facility] had his 1st prostate biopsies April 28, 2021 that were positive in 6/12 cores bilaterally for [place] score 4+3=7 disease in 3 cores with the other 3 cores showing 3+4=7 disease   His bone scan was negative for any evidence of metastatic disease and CT scan of the abdomen pelvis was also negative for evidence of metastatic disease   We discussed treatment options to include prostatectomy versus definitive external beam radiation therapy as well as observation  Gadiel Moeller was not comfortable with observation and also did not wish to pursue surgery   We discussed definitive radiation therapy alone or definitive radiation therapy with concurrent androgen deprivation therapy  Gadiel Moeller declined the androgen deprivation therapy and wanted to be treated with radiation therapy alone  Gadiel Moeller had placement of fiducial markers for image guided radiation therapy as well as the placement of a space-oar to reduce dose to the rectum  He completed treatment with intensity modulated radiation therapy using RapidArc technology as well as the image guided radiation therapy in the [address] October 27, 2021      He is seen for follow-up today with his wife  He reports he is feeling well  He has stable nocturia x1 with an occasional weak stream   He has normal bowel movements  He had a positive Cologuard test is scheduled for colonoscopy on June 8, 2022  PSA level had decreased down to 1 0 on April 21st and again on [place] 3, 2022  He is doing well and has no evidence of any disease  He has urology follow-up scheduled for July 25, 2022  He will return here for follow-up in 1 year with a repeat PSA level  Krista Vivar MD  6/6/2022,11:02 AM    Portions of the record may have been created with voice recognition software   Occasional wrong word or "sound a like" substitutions may have occurred due to the inherent limitations of voice recognition software   Read the chart carefully and recognize, using context, where substitutions have occurred

## 2022-06-09 ENCOUNTER — TELEMEDICINE (OUTPATIENT)
Dept: FAMILY MEDICINE CLINIC | Facility: CLINIC | Age: 67
End: 2022-06-09
Payer: MEDICARE

## 2022-06-09 ENCOUNTER — TELEPHONE (OUTPATIENT)
Dept: FAMILY MEDICINE CLINIC | Facility: CLINIC | Age: 67
End: 2022-06-09

## 2022-06-09 VITALS — HEART RATE: 121 BPM | DIASTOLIC BLOOD PRESSURE: 88 MMHG | TEMPERATURE: 101.3 F | SYSTOLIC BLOOD PRESSURE: 100 MMHG

## 2022-06-09 DIAGNOSIS — B34.9 VIRAL SYNDROME: Primary | ICD-10-CM

## 2022-06-09 PROCEDURE — 99213 OFFICE O/P EST LOW 20 MIN: CPT | Performed by: FAMILY MEDICINE

## 2022-06-09 NOTE — PATIENT INSTRUCTIONS
Wife will perform home test for COVID-19  Otherwise should increase his fluid with a lighter diet  Tylenol as needed for fever  It COVID test is negative, would observe for the next 1-2 days  If severe abdominal pain or any new symptoms should contact us or be evaluated the hospital emergency room  If COVID test is positive may be candidate for Paxlovid  Addendum-home COVID test negative a day 3 and half a symptoms  Blood pressure slightly down with elevated pulse  Wife instructed to push oral intake particularly fluids  She is aware should he be getting worse she should be evaluated hospital emergency room  This would include lightheaded dizziness, increasing heart rate, and lower blood pressure

## 2022-06-09 NOTE — TELEPHONE ENCOUNTER
Patient's wife called in with results, was told by Christo Segura to call back with results so he can follow up  Covid test was negative and blood pressure was 100 over 88

## 2022-06-09 NOTE — PROGRESS NOTES
Virtual Regular Visit    Verification of patient location:    Patient is located in the following state in which I hold an active license [place]      Assessment/Plan:    Problem List Items Addressed This Visit    None     Visit Diagnoses     Viral syndrome    -  Primary               Reason for visit is   Chief Complaint   Patient presents with    Virtual Brief Visit     Sick since Tuesday morning with lose bowels, fever has been continuing-103,102,101 3, chills--Negative Home covid test 06/09/2022    Virtual Regular Visit        Encounter provider Paco Mclain MD    Provider located at [address] [place] [id number]      Recent Visits  No visits were found meeting these conditions  Showing recent visits within past 7 days and meeting all other requirements  Today's Visits  Date Type Provider Dept   06/09/22 Telephone [place] [facility]   06/09/22 [id number], MD [facility] [address] [facility] today's visits and meeting all other requirements  Future Appointments  No visits were found meeting these conditions  Showing future appointments within next 150 days and meeting all other requirements       The patient was identified by name and date of birth  Gi Luis E was informed that this is a telemedicine visit and that the visit is being conducted through [address] and patient was informed this is a secure, HIPAA-complaint platform  He agrees to proceed     My office door was closed  No one else was in the room  He acknowledged consent and understanding of privacy and security of the video platform  The patient has agreed to participate and understands they can discontinue the visit at any time  Patient is aware this is a billable service  Rina Hicks is a 78 y o  male -   Patient calls in today with illness over the last 3 days    Initially had fever with some diarrhea  Poor appetite  This has improved was back to normal bowel movement still running low-grade fever about 101  No COVID testing today  Patient is vaccinated in boosted  Patient most last some fatigue and decreased appetite  There is also congestion and cough  Patient is able take blood pressure  Home COVID test was negative  Past Medical History:   Diagnosis Date    Arthritis     both knees    Prostate cancer [facility])        Past Surgical History:   Procedure Laterality Date    CARPAL TUNNEL RELEASE  2015    Both wrists    [place] PL[place] RADIOTHER DEVICE/MARKER, PROSTATE N/A 7/29/2021    Procedure: INSERTION OF FIDUCIAL MARKER (TRANSRECTAL ULTRASOUND GUIDANCE), SPACEOAR;  Surgeon: Petar Norton MD;  [facility];  Service: Urology    TONSILLECTOMY         Current Outpatient Medications   Medication Sig Dispense Refill    amLODIPine (NORVASC) 5 mg tablet Take 1 tablet (5 mg total) by mouth daily 90 tablet 3    sildenafil (VIAGRA) 100 mg tablet Take 1 tablet (100 mg total) by mouth daily as needed for erectile dysfunction 6 tablet 6    polyethylene glycol (GOLYTELY) 4000 mL solution Take 4,000 mL by mouth once for 1 dose 4000 mL 0     No current facility-administered medications for this visit  No Known Allergies    Review of Systems   Constitutional: Positive for fatigue and fever  Negative for appetite change, chills and diaphoresis  HENT: Positive for congestion  Negative for ear pain, rhinorrhea, sinus pressure and sore throat  Eyes: Negative for discharge, redness and itching  Respiratory: Positive for cough  Negative for shortness of breath and wheezing  Cardiovascular: Negative for chest pain and palpitations  Rapid or slow heart rate   Gastrointestinal: Positive for diarrhea  Negative for abdominal pain, nausea and vomiting         Video Exam    Vitals:    06/09/22 0921   BP: 100/88   Pulse: (!) 121   Temp: (!) 101 3 °F (38 5 °C)   TempSrc: Oral Physical Exam  Constitutional:       General: He is not in acute distress  Appearance: He is ill-appearing  HENT:      Head: Normocephalic and atraumatic  Pulmonary:      Effort: Pulmonary effort is normal  No respiratory distress  Neurological:      Mental Status: He is alert  Psychiatric:         Mood and Affect: Mood normal          Behavior: Behavior normal         Patient Instructions   Wife will perform home test for COVID-19  Otherwise should increase his fluid with a lighter diet  Tylenol as needed for fever  It COVID test is negative, would observe for the next 1-2 days  If severe abdominal pain or any new symptoms should contact us or be evaluated the hospital emergency room  If COVID test is positive may be candidate for Paxlovid  Addendum-home COVID test negative a day 3 and half a symptoms  Blood pressure slightly down with elevated pulse  Wife instructed to push oral intake particularly fluids  She is aware should he be getting worse she should be evaluated hospital emergency room  This would include lightheaded dizziness, increasing heart rate, and lower blood pressure  I spent 15 minutes directly with the patient during this visit    [address] [place] [address] verbally agrees to participate in [employer]  Pt is aware that [employer] could be limited without vital signs or the ability to perform a full hands-on physical exam  [facility] understands he or the provider may request at any time to terminate the video visit and request the patient to seek care or treatment in person

## 2022-06-14 ENCOUNTER — HOSPITAL ENCOUNTER (INPATIENT)
Facility: HOSPITAL | Age: 67
LOS: 3 days | Discharge: HOME | DRG: 871 | End: 2022-06-17
Attending: EMERGENCY MEDICINE | Admitting: INTERNAL MEDICINE
Payer: MEDICARE

## 2022-06-14 ENCOUNTER — APPOINTMENT (EMERGENCY)
Dept: RADIOLOGY | Facility: HOSPITAL | Age: 67
DRG: 871 | End: 2022-06-14
Payer: MEDICARE

## 2022-06-14 ENCOUNTER — OFFICE VISIT (OUTPATIENT)
Dept: FAMILY MEDICINE CLINIC | Facility: CLINIC | Age: 67
End: 2022-06-14
Payer: MEDICARE

## 2022-06-14 VITALS — HEART RATE: 120 BPM | OXYGEN SATURATION: 89 %

## 2022-06-14 DIAGNOSIS — M19.90 ARTHRITIS: ICD-10-CM

## 2022-06-14 DIAGNOSIS — A41.9 SEPSIS (HCC): ICD-10-CM

## 2022-06-14 DIAGNOSIS — F17.200 SMOKING: ICD-10-CM

## 2022-06-14 DIAGNOSIS — E87.1 HYPONATREMIA: ICD-10-CM

## 2022-06-14 DIAGNOSIS — J18.9 PNEUMONIA: ICD-10-CM

## 2022-06-14 DIAGNOSIS — R63.4 UNINTENTIONAL WEIGHT LOSS: Chronic | ICD-10-CM

## 2022-06-14 DIAGNOSIS — J96.01 ACUTE RESPIRATORY FAILURE WITH HYPOXIA (HCC): Primary | ICD-10-CM

## 2022-06-14 DIAGNOSIS — Z20.822 SUSPECTED COVID-19 VIRUS INFECTION: ICD-10-CM

## 2022-06-14 DIAGNOSIS — R74.01 TRANSAMINITIS: ICD-10-CM

## 2022-06-14 DIAGNOSIS — R50.9 ACUTE FEBRILE ILLNESS: Primary | ICD-10-CM

## 2022-06-14 DIAGNOSIS — R09.02 HYPOXEMIA: ICD-10-CM

## 2022-06-14 LAB
2HR DELTA HS TROPONIN: 1 NG/L
4HR DELTA HS TROPONIN: 2 NG/L
ALBUMIN SERPL BCP-MCNC: 2.6 G/DL (ref 3.5–5)
ALP SERPL-CCNC: 91 U/L (ref 46–116)
ALT SERPL W P-5'-P-CCNC: 129 U/L (ref 12–78)
ANION GAP SERPL CALCULATED.3IONS-SCNC: 10 MMOL/L (ref 4–13)
ANISOCYTOSIS BLD QL SMEAR: PRESENT
APTT PPP: 26 SECONDS (ref 23–37)
AST SERPL W P-5'-P-CCNC: 178 U/L (ref 5–45)
BASOPHILS # BLD MANUAL: 0 THOUSAND/UL (ref 0–0.1)
BASOPHILS NFR MAR MANUAL: 0 % (ref 0–1)
BILIRUB SERPL-MCNC: 0.8 MG/DL (ref 0.2–1)
BUN SERPL-MCNC: 11 MG/DL (ref 5–25)
CALCIUM ALBUM COR SERPL-MCNC: 10.4 MG/DL (ref 8.3–10.1)
CALCIUM SERPL-MCNC: 9.3 MG/DL (ref 8.3–10.1)
CARDIAC TROPONIN I PNL SERPL HS: 5 NG/L
CARDIAC TROPONIN I PNL SERPL HS: 6 NG/L
CARDIAC TROPONIN I PNL SERPL HS: 7 NG/L
CHLORIDE SERPL-SCNC: 98 MMOL/L (ref 100–108)
CO2 SERPL-SCNC: 24 MMOL/L (ref 21–32)
CREAT SERPL-MCNC: 1.26 MG/DL (ref 0.6–1.3)
EOSINOPHIL # BLD MANUAL: 0 THOUSAND/UL (ref 0–0.4)
EOSINOPHIL NFR BLD MANUAL: 0 % (ref 0–6)
ERYTHROCYTE [DISTWIDTH] IN BLOOD BY AUTOMATED COUNT: 15.1 % (ref 11.6–15.1)
FLUAV RNA RESP QL NAA+PROBE: NEGATIVE
FLUBV RNA RESP QL NAA+PROBE: NEGATIVE
GFR SERPL CREATININE-BSD FRML MDRD: 58 ML/MIN/1.73SQ M
GLUCOSE SERPL-MCNC: 120 MG/DL (ref 65–140)
HCT VFR BLD AUTO: 34.2 % (ref 36.5–49.3)
HGB BLD-MCNC: 11.5 G/DL (ref 12–17)
INR PPP: 1.15 (ref 0.84–1.19)
LACTATE SERPL-SCNC: 1.5 MMOL/L (ref 0.5–2)
LYMPHOCYTES # BLD AUTO: 0.55 THOUSAND/UL (ref 0.6–4.47)
LYMPHOCYTES # BLD AUTO: 5 % (ref 14–44)
MCH RBC QN AUTO: 29 PG (ref 26.8–34.3)
MCHC RBC AUTO-ENTMCNC: 33.6 G/DL (ref 31.4–37.4)
MCV RBC AUTO: 86 FL (ref 82–98)
METAMYELOCYTES NFR BLD MANUAL: 2 % (ref 0–1)
MONOCYTES # BLD AUTO: 0.22 THOUSAND/UL (ref 0–1.22)
MONOCYTES NFR BLD: 2 % (ref 4–12)
NEUTROPHILS # BLD MANUAL: 10.05 THOUSAND/UL (ref 1.85–7.62)
NEUTS BAND NFR BLD MANUAL: 5 % (ref 0–8)
NEUTS SEG NFR BLD AUTO: 86 % (ref 43–75)
PLATELET # BLD AUTO: 494 THOUSANDS/UL (ref 149–390)
PLATELET BLD QL SMEAR: ABNORMAL
PMV BLD AUTO: 9.1 FL (ref 8.9–12.7)
POTASSIUM SERPL-SCNC: 3.6 MMOL/L (ref 3.5–5.3)
PROCALCITONIN SERPL-MCNC: 0.47 NG/ML
PROT SERPL-MCNC: 7.9 G/DL (ref 6.4–8.2)
PROTHROMBIN TIME: 14.6 SECONDS (ref 11.6–14.5)
RBC # BLD AUTO: 3.97 MILLION/UL (ref 3.88–5.62)
RBC MORPH BLD: PRESENT
RSV RNA RESP QL NAA+PROBE: NEGATIVE
SARS-COV-2 RNA RESP QL NAA+PROBE: NEGATIVE
SODIUM SERPL-SCNC: 132 MMOL/L (ref 136–145)
WBC # BLD AUTO: 11.04 THOUSAND/UL (ref 4.31–10.16)

## 2022-06-14 PROCEDURE — 84484 ASSAY OF TROPONIN QUANT: CPT

## 2022-06-14 PROCEDURE — 87040 BLOOD CULTURE FOR BACTERIA: CPT | Performed by: EMERGENCY MEDICINE

## 2022-06-14 PROCEDURE — 87449 NOS EACH ORGANISM AG IA: CPT

## 2022-06-14 PROCEDURE — 99285 EMERGENCY DEPT VISIT HI MDM: CPT

## 2022-06-14 PROCEDURE — 94664 DEMO&/EVAL PT USE INHALER: CPT

## 2022-06-14 PROCEDURE — 0241U HB NFCT DS VIR RESP RNA 4 TRGT: CPT | Performed by: EMERGENCY MEDICINE

## 2022-06-14 PROCEDURE — 94760 N-INVAS EAR/PLS OXIMETRY 1: CPT

## 2022-06-14 PROCEDURE — 93005 ELECTROCARDIOGRAM TRACING: CPT

## 2022-06-14 PROCEDURE — 85610 PROTHROMBIN TIME: CPT | Performed by: EMERGENCY MEDICINE

## 2022-06-14 PROCEDURE — 85027 COMPLETE CBC AUTOMATED: CPT | Performed by: EMERGENCY MEDICINE

## 2022-06-14 PROCEDURE — 71045 X-RAY EXAM CHEST 1 VIEW: CPT

## 2022-06-14 PROCEDURE — 36415 COLL VENOUS BLD VENIPUNCTURE: CPT

## 2022-06-14 PROCEDURE — 84145 PROCALCITONIN (PCT): CPT | Performed by: EMERGENCY MEDICINE

## 2022-06-14 PROCEDURE — 83605 ASSAY OF LACTIC ACID: CPT | Performed by: EMERGENCY MEDICINE

## 2022-06-14 PROCEDURE — 99285 EMERGENCY DEPT VISIT HI MDM: CPT | Performed by: EMERGENCY MEDICINE

## 2022-06-14 PROCEDURE — 87081 CULTURE SCREEN ONLY: CPT

## 2022-06-14 PROCEDURE — 99223 1ST HOSP IP/OBS HIGH 75: CPT

## 2022-06-14 PROCEDURE — 80053 COMPREHEN METABOLIC PANEL: CPT | Performed by: EMERGENCY MEDICINE

## 2022-06-14 PROCEDURE — 96365 THER/PROPH/DIAG IV INF INIT: CPT

## 2022-06-14 PROCEDURE — 85007 BL SMEAR W/DIFF WBC COUNT: CPT | Performed by: EMERGENCY MEDICINE

## 2022-06-14 PROCEDURE — 85730 THROMBOPLASTIN TIME PARTIAL: CPT | Performed by: EMERGENCY MEDICINE

## 2022-06-14 PROCEDURE — 84484 ASSAY OF TROPONIN QUANT: CPT | Performed by: EMERGENCY MEDICINE

## 2022-06-14 PROCEDURE — 99214 OFFICE O/P EST MOD 30 MIN: CPT | Performed by: FAMILY MEDICINE

## 2022-06-14 RX ORDER — ENOXAPARIN SODIUM 100 MG/ML
40 INJECTION SUBCUTANEOUS DAILY
Status: DISCONTINUED | OUTPATIENT
Start: 2022-06-15 | End: 2022-06-17 | Stop reason: HOSPADM

## 2022-06-14 RX ORDER — CEFTRIAXONE 1 G/50ML
1000 INJECTION, SOLUTION INTRAVENOUS EVERY 24 HOURS
Status: DISCONTINUED | OUTPATIENT
Start: 2022-06-15 | End: 2022-06-17 | Stop reason: HOSPADM

## 2022-06-14 RX ORDER — AMLODIPINE BESYLATE 5 MG/1
5 TABLET ORAL DAILY
Status: DISCONTINUED | OUTPATIENT
Start: 2022-06-15 | End: 2022-06-16

## 2022-06-14 RX ORDER — CEFTRIAXONE 1 G/50ML
1000 INJECTION, SOLUTION INTRAVENOUS ONCE
Status: COMPLETED | OUTPATIENT
Start: 2022-06-14 | End: 2022-06-14

## 2022-06-14 RX ORDER — ACETAMINOPHEN 325 MG/1
650 TABLET ORAL EVERY 6 HOURS PRN
Status: DISCONTINUED | OUTPATIENT
Start: 2022-06-14 | End: 2022-06-17 | Stop reason: HOSPADM

## 2022-06-14 RX ORDER — IPRATROPIUM BROMIDE AND ALBUTEROL SULFATE 2.5; .5 MG/3ML; MG/3ML
3 SOLUTION RESPIRATORY (INHALATION) 3 TIMES DAILY PRN
Status: DISCONTINUED | OUTPATIENT
Start: 2022-06-14 | End: 2022-06-17 | Stop reason: HOSPADM

## 2022-06-14 RX ADMIN — SODIUM CHLORIDE 1000 ML: 0.9 INJECTION, SOLUTION INTRAVENOUS at 15:43

## 2022-06-14 RX ADMIN — ACETAMINOPHEN 650 MG: 325 TABLET ORAL at 22:47

## 2022-06-14 RX ADMIN — CEFTRIAXONE 1000 MG: 1 INJECTION, SOLUTION INTRAVENOUS at 16:07

## 2022-06-14 NOTE — H&P
[address] 1955, 78 y o  male MRN: [id number]  Unit/Bed#: -01 Encounter: [id number]  Primary Care Provider: Kip Duarte MD   Date and time admitted to hospital: 6/14/2022  3:23 PM    * Right lower lobe pneumonia  Assessment & Plan  · Presented with fevers/chills, cough, shortness of breath, general malaise x 1 week, T-max 103° at home  · SIRS:  Tachycardia (resolved after fluids), Temp 100 4 - no longer meeting sepsis criteria   · WBC 11   · LA 1 5  · COVID/FLU/RSV negative  · CXR-Nonspecific infiltrate in the right lower lung  Presumed infectious process, more likely bacterial pneumonia  · Procalcitonin 0 47, repeat in am   · Urinary antigens pending  · Sputum GS/culture pending  · BC x 2 pending   · IV ceftriaxone initiated 6/14, continue  · S/P 1L IVF in ED  · Respiratory protocol, aspiration precautions, airway clearance  · IS  · Nebs p r n  Wheezing/SOB (Wife reported wheezing at home, minimal expiratory wheezes on exam)  · Monitor WBC/fevers     Transaminitis  Assessment & Plan  · ,   · Denies abdominal symptoms, no tenderness on exam  · Monitor at this time     Hyponatremia  Assessment & Plan  · Sodium 132 on admission, likely in setting of poor p o  intake as patient has not had appetite  · S/P 1L IVF in ED, monitor am BMP     Prostate cancer [facility])  Assessment & Plan  · Patient completed radiation treatment 10/2021  · Follows up outpatient, with recent satisfactory PSA  · Continue routine outpatient follow-up    Essential hypertension  Assessment & Plan  · Continue home amlodipine 5 mg daily  ·  on admission    VTE Pharmacologic Prophylaxis: VTE Score: 5 High Risk (Score >/= 5) - Pharmacological DVT Prophylaxis Ordered: enoxaparin (Lovenox)  Sequential Compression Devices Ordered  Code Status: Level 1 - Full Code per patient and wife   Discussion with family: Updated  (wife) at bedside      Anticipated Length of Stay: Patient will be admitted on an inpatient basis with an anticipated length of stay of greater than 2 midnights secondary to Iv abx for pneumonia   Total Time for Visit, including Counseling / Coordination of Care: 30 minutes Greater than 50% of this total time spent on direct patient counseling and coordination of care  Chief Complaint: SOB, fevers/chills    History of Present Illness:  Cheri Ojeda is a 78 y o  male with a PMH of essential hypertension, prostate cancer, tobacco use who presents with fever/chills, night sweats, cough, shortness of breath, wheezing, general malaise x1 week  The patient was recommended to present to ED by PCP due to persistent fevers and increased SOB/dyspnea and SpO2 saturations in 80s as reported by patient wife  The patient denies additional chest pain, palpitations, sore throat, dizziness, N/V/D, abdominal pain, dysuria, or additional symptom  In the ED the patient received IV ceftriaxone and 1L IVF  The patient was originally placed on 2L O2 via nasal cannula for SpO2 89% however this was subsequently removed as patient was found saturating at 97% on RA at time of admission  Review of Systems:  Review of Systems   Constitutional: Positive for appetite change, chills, fatigue and fever  HENT: Positive for congestion, postnasal drip and rhinorrhea  Negative for sore throat  Eyes: Negative for visual disturbance  Respiratory: Positive for cough, shortness of breath and wheezing  Negative for chest tightness  Cardiovascular: Negative for chest pain, palpitations and leg swelling  Gastrointestinal: Negative for abdominal pain, constipation, diarrhea, nausea and vomiting  Genitourinary: Negative for difficulty urinating, dysuria and frequency  Musculoskeletal: Negative for arthralgias and myalgias  Skin: Negative for rash and wound  Neurological: Negative for dizziness, light-headedness and headaches     All other systems reviewed and are negative  Past Medical and Surgical History:   Past Medical History:   Diagnosis Date    Arthritis     both knees    Prostate cancer [facility])        Past Surgical History:   Procedure Laterality Date    CARPAL TUNNEL RELEASE  2015    Both wrists    [place] PLACE RADIOTHER DEVICE/MARKER, PROSTATE N/A 7/29/2021    Procedure: INSERTION OF FIDUCIAL MARKER (TRANSRECTAL ULTRASOUND GUIDANCE), SPACEOAR;  Surgeon: Nichelle Zaman MD;  [facility];  Service: Urology    TONSILLECTOMY         Meds/Allergies:  Prior to Admission medications    Medication Sig Start Date End Date Taking? Authorizing Provider   amLODIPine (NORVASC) 5 mg tablet Take 1 tablet (5 mg total) by mouth daily 4/8/22 6/14/22 Yes Roselyn Xavier MD   polyethylene glycol (GOLYTELY) 4000 mL solution Take 4,000 mL by mouth once for 1 dose 6/2/22 6/2/22  SEGUN Johnson   sildenafil (VIAGRA) 100 mg tablet Take 1 tablet (100 mg total) by mouth daily as needed for erectile dysfunction 2/22/22   Tarun Hampton MD     I have reviewed home medications with patient personally      Allergies: No Known Allergies    Social History:  Marital Status: /Civil Union   Occupation: not assessed  Patient Pre-hospital Living Situation: Home, With spouse  Patient Pre-hospital Level of Mobility: walks  Patient Pre-hospital Diet Restrictions: none   Substance Use History:   Social History     Substance and Sexual Activity   Alcohol Use Yes    Comment: drinks fifth of alcohol during week     Social History     Tobacco Use   Smoking Status Current Every Day Smoker    Packs/day: 1 00    Years: 40 00    Pack years: 40 00    Types: Cigarettes   Smokeless Tobacco Never Used     Social History     Substance and Sexual Activity   Drug Use Yes    Types: Marijuana    Comment: daily       Family History:  Family History   Problem Relation Age of Onset    Breast cancer Mother 48    Substance Abuse Neg Hx     Mental illness Neg Hx        Physical Exam: Vitals:   Blood Pressure: 124/70 (06/14/22 1742)  Pulse: 96 (06/14/22 1742)  Temperature: 100 4 °F (38 °C) (06/14/22 1742)  Temp Source: Oral (06/14/22 1742)  Respirations: 16 (06/14/22 1742)  Height: 6' (182 9 cm) (06/14/22 1432)  Weight - Scale: 77 6 kg (171 lb 1 2 oz) (06/14/22 1718)  SpO2: 92 % (06/14/22 1752)    Physical Exam  Vitals and nursing note reviewed  Constitutional:       General: He is not in acute distress  Appearance: He is well-developed  He is not ill-appearing  HENT:      Head: Normocephalic and atraumatic  Eyes:      General:         Right eye: No discharge  Left eye: No discharge  Extraocular Movements: Extraocular movements intact  Conjunctiva/sclera: Conjunctivae normal    Cardiovascular:      Rate and Rhythm: Normal rate and regular rhythm  Heart sounds: No murmur heard  Pulmonary:      Effort: Pulmonary effort is normal  No respiratory distress  Breath sounds: Wheezing present  No rhonchi or rales  Comments: Very slight expiratory wheeze in anterior fields   SPO2 97% on RA  Abdominal:      Palpations: Abdomen is soft  Tenderness: There is no abdominal tenderness  Musculoskeletal:      Cervical back: Neck supple  Skin:     General: Skin is warm and dry  Neurological:      Mental Status: He is alert            Additional Data:     Lab Results:  Results from last 7 days   Lab Units 06/14/22  1439   WBC Thousand/uL 11 04*   HEMOGLOBIN g/dL 11 5*   HEMATOCRIT % 34 2*   PLATELETS Thousands/uL 494*   BANDS PCT % 5   LYMPHO PCT % 5*   MONO PCT % 2*   EOS PCT % 0     Results from last 7 days   Lab Units 06/14/22  1439   SODIUM mmol/L 132*   POTASSIUM mmol/L 3 6   CHLORIDE mmol/L 98*   CO2 mmol/L 24   BUN mg/dL 11   CREATININE mg/dL 1 26   ANION GAP mmol/L 10   CALCIUM mg/dL 9 3   ALBUMIN g/dL 2 6*   TOTAL BILIRUBIN mg/dL 0 80   ALK PHOS U/L 91   ALT U/L 129*   AST U/L 178*   GLUCOSE RANDOM mg/dL 120     Results from last 7 days   Lab Units 06/14/22  1547   INR  1 15             Results from last 7 days   Lab Units 06/14/22  1553 06/14/22  1547   LACTIC ACID mmol/L  --  1 5   PROCALCITONIN ng/ml 0 47*  --        Imaging: Reviewed radiology reports from this admission including: chest xray  XR chest 1 view portable   Final Result by Marcy Burch DO (06/14 1601)   Nonspecific infiltrate in the right lower lung  Presumed infectious process  In the setting of clinically suspected/proven COVID-19, this plain film appearance while nonspecific and atypical for Covid, can be seen in cases of viral pneumonia such as    COVID-19  More likely bacterial pneumonia  Workstation performed: [id number]             EKG and Other Studies Reviewed on Admission:   · EKG: NSR    ** Please Note: This note has been constructed using a voice recognition system   **

## 2022-06-14 NOTE — ASSESSMENT & PLAN NOTE
· Sodium 132 on admission, likely in setting of poor p o  intake as patient has not had appetite  · received 1L IVF in ED  · 6/15 sodium 134

## 2022-06-14 NOTE — ASSESSMENT & PLAN NOTE
· 6/13 Presented with fevers/chills, cough, shortness of breath, general malaise x 1 week, T-max 103° at home  · SIRS:  Tachycardia (resolved after fluids), Temp 100 4 - no longer meeting sepsis criteria   · WBC 11   · LA 1 5  · CXR-Nonspecific infiltrate in the right lower lung  Presumed infectious process, more likely bacterial pneumonia  · Procalcitonin 0 98  · COVID/FLU/RSV negative  · Urinary antigens negative  · Sputum GS/culture pending  · BC x 2 pending   · IV ceftriaxone initiated 6/14, continue  · received 1L IVF in ED  · Respiratory protocol, aspiration precautions, airway clearance, incentive spirometry  · Nebs p r n   Wheezing/SOB  · Monitor WBC/fevers

## 2022-06-14 NOTE — SEPSIS NOTE
Sepsis Note   [facility] 78 y o  male MRN: [id number]  Unit/Bed#: ED 11 Encounter: [id number]       qSOFA     Row Name 06/14/22 1530 06/14/22 1432             Altered mental status GCS < 15 -- --       Respiratory Rate > / =39 0 0       Systolic BP < / =825 0 1       Q Sofa Score 0 1                  Initial Sepsis Screening     Row Name 06/14/22 5144                Is the patient's history suggestive of a new or worsening infection? Yes (Proceed)  -LG        Suspected source of infection pneumonia  -LG        Are two or more of the following signs & symptoms of infection both present and new to the patient? Yes (Proceed)  -LG        Indicate SIRS criteria Tachycardia > 90 bpm;Hyperthemia > 38 3C (100 9F)  fever reported at home today as 101  -LG        If the answer is yes to both questions, suspicion of sepsis is present --        If severe sepsis is present AND tissue hypoperfusion perists in the hour after fluid resuscitation or lactate > 4, the patient meets criteria for SEPTIC SHOCK --        Are any of the following organ dysfunction criteria present within 6 hours of suspected infection and SIRS criteria that are NOT considered to be chronic conditions?  No  -LG        Organ dysfunction --        Date of presentation of severe sepsis --        Time of presentation of severe sepsis --        Tissue hypoperfusion persists in the hour after crystalloid fluid administration, evidenced, by either: --        Was hypotension present within one hour of the conclusion of crystalloid fluid administration? --        Date of presentation of septic shock --        Time of presentation of septic shock --              User Key  (r) = Recorded By, (t) = Taken By, (c) = Cosigned By    [address] Name Provider Type    LG Montserrat Bedoya DO Physician

## 2022-06-14 NOTE — ASSESSMENT & PLAN NOTE
· Sodium 132 on admission, likely in setting of poor p o  intake as patient has not had appetite  · S/P 1L IVF in ED, monitor am BMP

## 2022-06-14 NOTE — ASSESSMENT & PLAN NOTE
· Presented with fevers/chills, cough, shortness of breath, general malaise x 1 week, T-max 103° at home  · SIRS:  Tachycardia, reported fever, wbc 11- not meeting sepsis criteria   · Temp 100 3 in ED  · COVID/FLU/RSV negative  · CXR-Nonspecific infiltrate in the right lower lung  Presumed infectious process, more likely bacterial pneumonia  · LA 1 5  · Procalcitonin 0 47, repeat in am   · Urinary antigens pending  · Sputum GS/culture pending  · BC x 2 pending   · IV ceftriaxone initiated 6/14, continue  · S/P 1L IVF in ED  · Respiratory protocol, aspiration precautions, airway clearance  · IS  · Nebs p r n   Wheezing/SOB (Wife reported wheezing at home, minimal expiratory wheezes on exam)  · Monitor WBC/fevers

## 2022-06-14 NOTE — ED PROVIDER NOTES
History  Chief Complaint   Patient presents with    Fever - 9 weeks to 76 years     X1 week; was at pcp today and told to come to ed for eval as oxygen sat was low; possible pneumonia      Patient is a 78year old male with a past medical history significant for HTN, prior prostate CA, tobacco dependence, who today presents with hypoxia and cold like symptoms  Patient reports that over the last 7 days he has been battling cold like symptoms  Reports cough, congestion, fevers (tmax 103), chills, fatigue, myalgias  Denies any chest pain, shortness of breath, lightheadedness, dizziness, headache, neck pain or stiffness, abdominal pain, nausea, vomiting, diarrhea  Patient was evaluated at his PCP office earlier this afternoon and was found to be hypoxic with O2 89%, tachycardic in the 120s, and thus was instructed to present to the ED for evaluation  Prior to Admission Medications   Prescriptions Last Dose Informant Patient Reported? Taking?    amLODIPine (NORVASC) 5 mg tablet 6/13/2022 at Unknown time Self No Yes   Sig: Take 1 tablet (5 mg total) by mouth daily   polyethylene glycol (GOLYTELY) 4000 mL solution   No No   Sig: Take 4,000 mL by mouth once for 1 dose   sildenafil (VIAGRA) 100 mg tablet  Self No No   Sig: Take 1 tablet (100 mg total) by mouth daily as needed for erectile dysfunction      Facility-Administered Medications: None       Past Medical History:   Diagnosis Date    Arthritis     both knees    Prostate cancer [facility])        Past Surgical History:   Procedure Laterality Date    CARPAL TUNNEL RELEASE  2015    Both wrists    MA PLACE RADIOTHER DEVICE/MARKER, PROSTATE N/A 7/29/2021    Procedure: INSERTION OF FIDUCIAL MARKER (TRANSRECTAL ULTRASOUND GUIDANCE), SPACEOAR;  Surgeon: Vimal Shultz MD;  Location[facility];  Service: Urology    TONSILLECTOMY         Family History   Problem Relation Age of Onset    Breast cancer Mother 48    Substance Abuse Neg Hx     Mental illness Neg Hx      I have reviewed and agree with the history as documented  E-Cigarette/Vaping    E-Cigarette Use Never User      E-Cigarette/Vaping Substances    Nicotine No     THC No     CBD No     Flavoring No     Other No     Unknown No      Social History     Tobacco Use    Smoking status: Current Every Day Smoker     Packs/day: 1 00     Years: 40 00     Pack years: 40 00     Types: Cigarettes    Smokeless tobacco: Never Used   Vaping Use    Vaping Use: Never used   Substance Use Topics    Alcohol use: Yes     Comment: drinks fifth of alcohol during week    Drug use: Yes     Types: Marijuana     Comment: daily       Review of Systems   Constitutional: Positive for appetite change, chills, fatigue and fever  HENT: Negative for congestion and rhinorrhea  Eyes: Negative for photophobia and visual disturbance  Respiratory: Positive for cough  Negative for chest tightness and shortness of breath  Cardiovascular: Negative for chest pain, palpitations and leg swelling  Gastrointestinal: Negative for abdominal pain, constipation, diarrhea, nausea and vomiting  Genitourinary: Negative for dysuria, flank pain and hematuria  Musculoskeletal: Negative for back pain, neck pain and neck stiffness  Skin: Negative for color change and pallor  Neurological: Positive for weakness (generalized )  Negative for dizziness, light-headedness, numbness and headaches  Physical Exam  Physical Exam  Vitals and nursing note reviewed  Constitutional:       General: He is not in acute distress  Appearance: Normal appearance  He is not ill-appearing, toxic-appearing or diaphoretic  HENT:      Head: Normocephalic and atraumatic  Mouth/Throat:      Mouth: Mucous membranes are moist    Eyes:      Extraocular Movements: Extraocular movements intact  Conjunctiva/sclera: Conjunctivae normal       Pupils: Pupils are equal, round, and reactive to light     Cardiovascular:      Rate and Rhythm: Regular rhythm  Tachycardia present  Pulses: Normal pulses  Heart sounds: Normal heart sounds  No murmur heard  Pulmonary:      Effort: Pulmonary effort is normal  No respiratory distress  Breath sounds: No stridor  Decreased breath sounds present  No wheezing, rhonchi or rales  Chest:      Chest wall: No tenderness  Abdominal:      General: Bowel sounds are normal  There is no distension  Palpations: Abdomen is soft  Tenderness: There is no abdominal tenderness  There is no guarding or rebound  Musculoskeletal:      Cervical back: Neck supple  Right lower leg: No edema  Left lower leg: No edema  Skin:     General: Skin is warm and dry  Neurological:      General: No focal deficit present  Mental Status: He is alert and oriented to person, place, and time  Mental status is at baseline     Psychiatric:         Mood and Affect: Mood normal          Behavior: Behavior normal          Vital Signs  ED Triage Vitals [06/14/22 1432]   Temperature Pulse Respirations Blood Pressure SpO2   100 3 °F (37 9 °C) (!) 115 18 99/68 94 %      Temp Source Heart Rate Source Patient Position - Orthostatic VS BP Location FiO2 (%)   Oral Monitor Sitting Left arm --      Pain Score       No Pain           Vitals:    06/14/22 1432 06/14/22 1442 06/14/22 1530   BP: 99/68  130/76   Pulse: (!) 115 103 105   Patient Position - Orthostatic VS: Sitting           Visual Acuity      ED Medications  Medications   sodium chloride 0 9 % bolus 1,000 mL (1,000 mL Intravenous New Bag 6/14/22 1543)   cefTRIAXone (ROCEPHIN) IVPB (premix in dextrose) 1,000 mg 50 mL (1,000 mg Intravenous New Bag 6/14/22 1607)       Diagnostic Studies  Results Reviewed     Procedure Component Value Units Date/Time    COVID/FLU/RSV - 2 hour TAT [[id number]]  (Normal) Collected: 06/14/22 1542    Lab Status: Final result Specimen: Nares from Nose Updated: 06/14/22 1643     SARS-CoV-2 Negative     INFLUENZA A PCR Negative     INFLUENZA B PCR Negative     RSV PCR Negative    Narrative:      FOR PEDIATRIC PATIENTS - copy/paste COVID Guidelines URL to browser: [email or web address]    SARS-CoV-2 assay is a Nucleic Acid Amplification assay intended for the  qualitative detection of nucleic acid from SARS-CoV-2 in nasopharyngeal  swabs  Results are for the presumptive identification of SARS-CoV-2 RNA  Positive results are indicative of infection with SARS-CoV-2, the virus  causing COVID-19, but do not rule out bacterial infection or co-infection  with other viruses  Laboratories within the [place] and its  territories are required to report all positive results to the appropriate  public health authorities  Negative results do not preclude SARS-CoV-2  infection and should not be used as the sole basis for treatment or other  patient management decisions  Negative results must be combined with  clinical observations, patient history, and epidemiological information  This test has not been FDA cleared or approved  This test has been authorized by FDA under an Emergency Use Authorization  (EUA)  This test is only authorized for the duration of time the  declaration that circumstances exist justifying the authorization of the  emergency use of an in vitro diagnostic tests for detection of SARS-CoV-2  virus and/or diagnosis of COVID-19 infection under section 564(b)(1) of  the Act, 21 U  S C  847TNU-0(A)(8), unless the authorization is terminated  or revoked sooner  The test has been validated but independent review by FDA  and CLIA is pending  Test performed using [employer] GeneXpert: This RT-PCR assay targets N2,  a region unique to SARS-CoV-2  A conserved region in the E-gene was chosen  for pan-Sarbecovirus detection which includes SARS-CoV-2      Procalcitonin [[id number]]  (Abnormal) Collected: 06/14/22 5805    Lab Status: Final result Specimen: Blood from Arm, Left Updated: 06/14/22 2430 Procalcitonin 0 47 ng/ml     Lactic acid [[id number]]  (Normal) Collected: 06/14/22 1547    Lab Status: Final result Specimen: Blood from Arm, Left Updated: 06/14/22 1624     LACTIC ACID 1 5 mmol/L     Narrative:      Result may be elevated if tourniquet was used during collection  Protime-INR [[id number]]  (Abnormal) Collected: 06/14/22 1547    Lab Status: Final result Specimen: Blood from Arm, Left Updated: 06/14/22 1617     Protime 14 6 seconds      INR 1 15    APTT [[id number]]  (Normal) Collected: 06/14/22 1547    Lab Status: Final result Specimen: Blood from Arm, Left Updated: 06/14/22 1617     PTT 26 seconds     Blood culture #2 [[id number]] Collected: 06/14/22 1547    Lab Status: In process Specimen: Blood from Arm, Left Updated: 06/14/22 1607    Blood culture #1 [[id number]] Collected: 06/14/22 1547    Lab Status:  In process Specimen: Blood from Arm, Left Updated: 06/14/22 1606    HS Troponin I 4hr [[id number]]     Lab Status: No result Specimen: Blood     HS Troponin I 2hr [[id number]]     Lab Status: No result Specimen: Blood     HS Troponin 0hr (reflex protocol) [[id number]]  (Normal) Collected: 06/14/22 1439    Lab Status: Final result Specimen: Blood from Arm, Left Updated: 06/14/22 1525     hs TnI 0hr 5 ng/L     Comprehensive metabolic panel [[id number]]  (Abnormal) Collected: 06/14/22 1439    Lab Status: Final result Specimen: Blood from Arm, Left Updated: 06/14/22 1522     Sodium 132 mmol/L      Potassium 3 6 mmol/L      Chloride 98 mmol/L      CO2 24 mmol/L      ANION GAP 10 mmol/L      BUN 11 mg/dL      Creatinine 1 26 mg/dL      Glucose 120 mg/dL      Calcium 9 3 mg/dL      Corrected Calcium 10 4 mg/dL       U/L       U/L      Alkaline Phosphatase 91 U/L      Total Protein 7 9 g/dL      Albumin 2 6 g/dL      Total Bilirubin 0 80 mg/dL      eGFR 58 ml/min/1 73sq m     Narrative:      [place] guidelines for Chronic Kidney Disease (CKD):     Stage 1 with normal or high GFR (GFR > 90 mL/min/1 73 square meters)    Stage 2 Mild CKD (GFR = 60-89 mL/min/1 73 square meters)    Stage 3A Moderate CKD (GFR = 45-59 mL/min/1 73 square meters)    Stage 3B Moderate CKD (GFR = 30-44 mL/min/1 73 square meters)    Stage 4 Severe CKD (GFR = 15-29 mL/min/1 73 square meters)    Stage 5 End Stage CKD (GFR <15 mL/min/1 73 square meters)  Note: GFR calculation is accurate only with a steady state creatinine    CBC and differential [[id number]]  (Abnormal) Collected: 06/14/22 1439    Lab Status: Final result Specimen: Blood from Arm, Left Updated: 06/14/22 1510     WBC 11 04 Thousand/uL      RBC 3 97 Million/uL      Hemoglobin 11 5 g/dL      Hematocrit 34 2 %      MCV 86 fL      MCH 29 0 pg      MCHC 33 6 g/dL      RDW 15 1 %      MPV 9 1 fL      Platelets 912 Thousands/uL     Narrative: This is an appended report  These results have been appended to a previously verified report  Manual Differential(PHLEBS Do Not Order) [[id number]]  (Abnormal) Collected: 06/14/22 1439    Lab Status: Final result Specimen: Blood from Arm, Left Updated: 06/14/22 1510     Segmented % 86 %      Bands % 5 %      Lymphocytes % 5 %      Monocytes % 2 %      Eosinophils, % 0 %      Basophils % 0 %      Metamyelocytes% 2 %      Absolute Neutrophils 10 05 Thousand/uL      Lymphocytes Absolute 0 55 Thousand/uL      Monocytes Absolute 0 22 Thousand/uL      Eosinophils Absolute 0 00 Thousand/uL      Basophils Absolute 0 00 Thousand/uL      Total Counted --     RBC Morphology Present     Anisocytosis Present     Platelet Estimate Increased                 XR chest 1 view portable   Final Result by Remberto Meyer DO (06/14 1601)   Nonspecific infiltrate in the right lower lung  Presumed infectious process  In the setting of clinically suspected/proven COVID-19, this plain film appearance while nonspecific and atypical for Covid, can be seen in cases of viral pneumonia such as    COVID-19    More likely bacterial pneumonia  Workstation performed: [id number]                    Procedures  ECG 12 Lead Documentation Only    Date/Time: 6/14/2022 3:55 PM  Performed by: Moe De La Cruz DO  Authorized by: Moe De La Cruz DO     Indications / Diagnosis:  Infectious workup  ECG reviewed by me, the ED Provider: yes    Patient location:  ED  Previous ECG:     Previous ECG:  Unavailable  Interpretation:     Interpretation: normal    Rate:     ECG rate:  104    ECG rate assessment: tachycardic    Rhythm:     Rhythm: sinus tachycardia    Ectopy:     Ectopy: none    QRS:     QRS axis:  Normal    QRS intervals:  Normal  Conduction:     Conduction: normal    ST segments:     ST segments:  Normal  T waves:     T waves: inverted      Inverted:  AVL  Comments:      [facility]34             ED Course  ED Course as of 06/14/22 1648   Tue Jun 14, 2022   1634 Procalcitonin(!): 0 47                            Initial Sepsis Screening     Row Name 06/14/22 1634                Is the patient's history suggestive of a new or worsening infection? Yes (Proceed)  -LG        Suspected source of infection pneumonia  -LG        Are two or more of the following signs & symptoms of infection both present and new to the patient? Yes (Proceed)  -LG        Indicate SIRS criteria Tachycardia > 90 bpm;Hyperthemia > 38 3C (100 9F)  fever reported at home today as 101  -LG        If the answer is yes to both questions, suspicion of sepsis is present --        If severe sepsis is present AND tissue hypoperfusion perists in the hour after fluid resuscitation or lactate > 4, the patient meets criteria for SEPTIC SHOCK --        Are any of the following organ dysfunction criteria present within 6 hours of suspected infection and SIRS criteria that are NOT considered to be chronic conditions?  No  -LG        Organ dysfunction --        Date of presentation of severe sepsis --        Time of presentation of severe sepsis --        Tissue hypoperfusion persists in the hour after crystalloid fluid administration, evidenced, by either: --        Was hypotension present within one hour of the conclusion of crystalloid fluid administration? --        Date of presentation of septic shock --        Time of presentation of septic shock --              User Key  (r) = Recorded By, (t) = Taken By, (c) = Cosigned By    [address] Name Provider Type    [facility], DO Physician                              MDM  Number of Diagnoses or Management Options  Acute respiratory failure with hypoxia [facility])  Diagnosis management comments: Assessment and Plan: On initial exam, patient is sating 90% on RA without exertion with clear but diminished lungs  Will perform infectious workup to assess for PNA v  COVID/URI  Start fluid resuscitation with 1 L NS and patient was placed on 1 L O2 via NC  Disposition  Final diagnoses:   Acute respiratory failure with hypoxia ([address] )   Pneumonia   Transaminitis   Sepsis ([address] )     Time reflects when diagnosis was documented in both MDM as applicable and the Disposition within this note     Time User Action Codes Description Comment    6/14/2022  3:48 PM Emogene Bacca Add [J96 01] Acute respiratory failure with hypoxia ([address] )     6/14/2022  4:01 PM Barkley Lawn [J18 9] Pneumonia     6/14/2022  4:04 PM Emogene Bacca Add [R74 01] Transaminitis     6/14/2022  4:35 PM Emogene Bacca Add [A41 9] Sepsis [facility])       ED Disposition     ED Disposition   Admit    Condition   Stable    Date/Time   Tue Jun 14, 2022  3:48 PM    Comment   Case was discussed with hospitalist and the patient's admission status was agreed to be Admission Status: inpatient status to the service of Dr Royer Jurado   Follow-up Information    None         Patient's Medications   Discharge Prescriptions    No medications on file       No discharge procedures on file      PDMP Review     None          ED Provider  Electronically Signed by           Miguel Angel Harmon DO  06/14/22 1642

## 2022-06-14 NOTE — PATIENT INSTRUCTIONS
Due to possibility of pneumonia and COVID Disease, would recommend ER evaluation due to decreased O2 saturations and lung findings

## 2022-06-14 NOTE — PROGRESS NOTES
[id number]         NAME: Marley  is a 78 y o  male  : 1955    MRN: [id number]  DATE: 2022  TIME: 2:21 PM    Assessment and Plan   Acute febrile illness [R50 9]  1  Acute febrile illness     2  Suspected COVID-19 virus infection         No problem-specific Assessment & Plan notes found for this encounter  Patient Instructions     Patient Instructions   Due to possibility of pneumonia and COVID Disease, would recommend ER evaluation due to decreased O2 saturations and lung findings  Chief Complaint     Chief Complaint   Patient presents with    Follow-up         History of Present Illness       Patient seen and parking lot due to acute respiratory infection with fever  He has been ill for the last 4-5 days  Has shortness of breath with congestion fever up to 102°  This morning it is still 101°  Had a COVID test at home 4 days ago  Having some shortness of breath  Review of Systems   Review of Systems   Constitutional: Positive for chills, diaphoresis, fatigue and fever  Respiratory: Positive for cough and shortness of breath  Negative for wheezing  Cardiovascular: Negative for chest pain, palpitations and leg swelling  Neurological: Positive for weakness  Negative for dizziness, speech difficulty, light-headedness and headaches           Current Medications       Current Outpatient Medications:     amLODIPine (NORVASC) 5 mg tablet, Take 1 tablet (5 mg total) by mouth daily, Disp: 90 tablet, Rfl: 3    polyethylene glycol (GOLYTELY) 4000 mL solution, Take 4,000 mL by mouth once for 1 dose, Disp: 4000 mL, Rfl: 0    sildenafil (VIAGRA) 100 mg tablet, Take 1 tablet (100 mg total) by mouth daily as needed for erectile dysfunction, Disp: 6 tablet, Rfl: 6    Current Allergies     Allergies as of 2022    (No Known Allergies)            The following portions of the patient's history were reviewed and updated as appropriate: allergies, current medications, past family history, past medical history, past social history, past surgical history and problem list      Past Medical History:   Diagnosis Date    Arthritis     both knees    Prostate cancer [facility])        Past Surgical History:   Procedure Laterality Date    CARPAL TUNNEL RELEASE  2015    Both wrists    TX PLACE RADIOTHER DEVICE/MARKER, PROSTATE N/A 7/29/2021    Procedure: INSERTION OF FIDUCIAL MARKER (TRANSRECTAL ULTRASOUND GUIDANCE), Niharika Jaimes;  Surgeon: Kelly Cohen MD;  Location: [facility];  Service: Urology    TONSILLECTOMY         Family History   Problem Relation Age of Onset    Breast cancer Mother 48    Substance Abuse Neg Hx     Mental illness Neg Hx          Medications have been verified  Objective   Pulse (!) 120   SpO2 (!) 89%        Physical Exam     Physical Exam  Constitutional:       Appearance: He is ill-appearing  HENT:      Head: Normocephalic and atraumatic  Eyes:      Extraocular Movements: Extraocular movements intact  Pupils: Pupils are equal, round, and reactive to light  Cardiovascular:      Rate and Rhythm: Normal rate and regular rhythm  Heart sounds: Normal heart sounds  Pulmonary:      Effort: Pulmonary effort is normal       Breath sounds: Examination of the right-middle field reveals decreased breath sounds  Examination of the right-lower field reveals decreased breath sounds  Decreased breath sounds present  No wheezing or rhonchi  Neurological:      Mental Status: He is alert

## 2022-06-14 NOTE — ED NOTES
Ambulatory pulse ox completed, 95% before and during walk  Patient brought to floor in wheelchair with his wife       Michelle Marquez RN  06/14/22 4415

## 2022-06-14 NOTE — PLAN OF CARE
Problem: PAIN - ADULT  Goal: Verbalizes/displays adequate comfort level or baseline comfort level  Description: Interventions:  - Encourage patient to monitor pain and request assistance  - Assess pain using appropriate pain scale  - Administer analgesics based on type and severity of pain and evaluate response  - Implement non-pharmacological measures as appropriate and evaluate response  - Consider cultural and social influences on pain and pain management  - Notify physician/advanced practitioner if interventions unsuccessful or patient reports new pain  Outcome: Progressing     Problem: INFECTION - ADULT  Goal: Absence or prevention of progression during hospitalization  Description: INTERVENTIONS:  - Assess and monitor for signs and symptoms of infection  - Monitor lab/diagnostic results  - Monitor all insertion sites, i e  indwelling lines, tubes, and drains  - Monitor endotracheal if appropriate and nasal secretions for changes in amount and color  - [place] appropriate cooling/warming therapies per order  - Administer medications as ordered  - Instruct and encourage patient and family to use good hand hygiene technique  - Identify and instruct in appropriate isolation precautions for identified infection/condition  Outcome: Progressing  Goal: Absence of fever/infection during neutropenic period  Description: INTERVENTIONS:  - Monitor WBC    Outcome: Progressing     Problem: SAFETY ADULT  Goal: Patient will remain free of falls  Description: INTERVENTIONS:  - Educate patient/family on patient safety including physical limitations  - Instruct patient to call for assistance with activity   - Consult OT/PT to assist with strengthening/mobility   - Keep Call bell within reach  - Keep bed low and locked with side rails adjusted as appropriate  - Keep care items and personal belongings within reach  - Initiate and maintain comfort rounds  - Make Fall Risk Sign visible to staff  - Offer Toileting every 2 Hours, in advance of need  - Initiate/Maintain alarm  - Obtain necessary fall risk management equipment  - Apply yellow socks and bracelet for high fall risk patients  - Consider moving patient to room near nurses station  Outcome: Progressing  Goal: Maintain or return to baseline ADL function  Description: INTERVENTIONS:  -  Assess patient's ability to carry out ADLs; assess patient's baseline for ADL function and identify physical deficits which impact ability to perform ADLs (bathing, care of mouth/teeth, toileting, grooming, dressing, etc )  - Assess/evaluate cause of self-care deficits   - Assess range of motion  - Assess patient's mobility; develop plan if impaired  - Assess patient's need for assistive devices and provide as appropriate  - Encourage maximum independence but intervene and supervise when necessary  - Involve family in performance of ADLs  - Assess for home care needs following discharge   - Consider OT consult to assist with ADL evaluation and planning for discharge  - Provide patient education as appropriate  Outcome: Progressing  Goal: Maintains/Returns to pre admission functional level  Description: INTERVENTIONS:  - Perform BMAT or MOVE assessment daily    - Set and communicate daily mobility goal to care team and patient/family/caregiver  - Collaborate with rehabilitation services on mobility goals if consulted  - Perform Range of Motion 4 times a day  - Reposition patient every 2 hours    - Dangle patient 4 times a day  - Stand patient 4 times a day  - Ambulate patient 4 times a day  - Out of bed to chair 4 times a day   - Out of bed for meals 3 times a day  - Out of bed for toileting  - Record patient progress and toleration of activity level   Outcome: Progressing     Problem: DISCHARGE PLANNING  Goal: Discharge to home or other facility with appropriate resources  Description: INTERVENTIONS:  - Identify barriers to discharge w/patient and caregiver  - Arrange for needed discharge resources and transportation as appropriate  - Identify discharge learning needs (meds, wound care, etc )  - Arrange for interpretive services to assist at discharge as needed  - Refer to Case Management Department for coordinating discharge planning if the patient needs post-hospital services based on physician/advanced practitioner order or complex needs related to functional status, cognitive ability, or social support system  Outcome: Progressing     Problem: Knowledge Deficit  Goal: Patient/family/caregiver demonstrates understanding of disease process, treatment plan, medications, and discharge instructions  Description: Complete learning assessment and assess knowledge base    Interventions:  - Provide teaching at level of understanding  - Provide teaching via preferred learning methods  Outcome: Progressing     Problem: RESPIRATORY - ADULT  Goal: Achieves optimal ventilation and oxygenation  Description: INTERVENTIONS:  - Assess for changes in respiratory status  - Assess for changes in mentation and behavior  - Position to facilitate oxygenation and minimize respiratory effort  - Oxygen administered by appropriate delivery if ordered  - Initiate smoking cessation education as indicated  - Encourage broncho-pulmonary hygiene including cough, deep breathe, Incentive Spirometry  - Assess the need for suctioning and aspirate as needed  - Assess and instruct to report SOB or any respiratory difficulty  - Respiratory Therapy support as indicated  Outcome: Progressing

## 2022-06-14 NOTE — ASSESSMENT & PLAN NOTE
· Patient completed radiation treatment 10/2021  · Follows up outpatient, with recent satisfactory PSA  · Continue routine outpatient follow-up

## 2022-06-14 NOTE — RESPIRATORY THERAPY NOTE
RT Protocol Note  Helena Osorio 78 y o  male MRN: [id number]  Unit/Bed#: -01 Encounter: [id number]    Assessment    Principal Problem:    Right lower lobe pneumonia  Active Problems:    Essential hypertension    Prostate cancer ([facility] )    Hyponatremia      Home Pulmonary Medications:  none       Past Medical History:   Diagnosis Date    Arthritis     both knees    Prostate cancer ([facility] )      Social History     Socioeconomic History    Marital status: /Civil Union     Spouse name: None    Number of children: None    Years of education: None    Highest education level: None   Occupational History    None   Tobacco Use    Smoking status: Current Every Day Smoker     Packs/day: 1 00     Years: 40 00     Pack years: 40 00     Types: Cigarettes    Smokeless tobacco: Never Used   Vaping Use    Vaping Use: Never used   Substance and Sexual Activity    Alcohol use: Yes     Comment: drinks fifth of alcohol during week    Drug use: Yes     Types: Marijuana     Comment: daily    Sexual activity: Yes     Partners: Female   Other Topics Concern    None   Social History Narrative    None     Social Determinants of Health     Financial Resource Strain: Not on file   Food Insecurity: Not on file   Transportation Needs: Not on file   Physical Activity: Not on file   Stress: Not on file   Social Connections: Not on file   Intimate Partner Violence: Not on file   Housing Stability: Not on file       Subjective         Objective    Physical Exam:   Assessment Type: Assess only  General Appearance: Alert, Awake  Respiratory Pattern: Normal  Chest Assessment: Chest expansion symmetrical  Bilateral Breath Sounds: Clear, Diminished  Cough: Non-productive    Vitals:  Blood pressure 124/70, pulse 96, temperature 100 4 °F (38 °C), temperature source Oral, resp  rate 16, height 6' (1 829 m), weight 77 6 kg (171 lb 1 2 oz), SpO2 92 %  Imaging and other studies: I have personally reviewed pertinent reports  Plan    Respiratory Plan: No distress/Pulmonary history

## 2022-06-14 NOTE — ASSESSMENT & PLAN NOTE
· ,  with scleral icterus   · Pt states he drinks 1/5-1/2 of alcohol per week   · Hepatitis panel pending   · GI consulted

## 2022-06-15 ENCOUNTER — APPOINTMENT (INPATIENT)
Dept: ULTRASOUND IMAGING | Facility: HOSPITAL | Age: 67
DRG: 871 | End: 2022-06-15
Payer: MEDICARE

## 2022-06-15 PROBLEM — R63.4 UNINTENTIONAL WEIGHT LOSS: Chronic | Status: ACTIVE | Noted: 2022-06-15

## 2022-06-15 PROBLEM — A41.9 SEPSIS (HCC): Status: ACTIVE | Noted: 2022-06-15

## 2022-06-15 LAB
ALBUMIN SERPL BCP-MCNC: 2.2 G/DL (ref 3.5–5)
ALP SERPL-CCNC: 87 U/L (ref 46–116)
ALT SERPL W P-5'-P-CCNC: 121 U/L (ref 12–78)
ANION GAP SERPL CALCULATED.3IONS-SCNC: 13 MMOL/L (ref 4–13)
AST SERPL W P-5'-P-CCNC: 141 U/L (ref 5–45)
ATRIAL RATE: 104 BPM
BILIRUB SERPL-MCNC: 0.9 MG/DL (ref 0.2–1)
BUN SERPL-MCNC: 10 MG/DL (ref 5–25)
CALCIUM ALBUM COR SERPL-MCNC: 10 MG/DL (ref 8.3–10.1)
CALCIUM SERPL-MCNC: 8.6 MG/DL (ref 8.3–10.1)
CHLORIDE SERPL-SCNC: 99 MMOL/L (ref 100–108)
CO2 SERPL-SCNC: 22 MMOL/L (ref 21–32)
CREAT SERPL-MCNC: 1.02 MG/DL (ref 0.6–1.3)
ERYTHROCYTE [DISTWIDTH] IN BLOOD BY AUTOMATED COUNT: 14.6 % (ref 11.6–15.1)
GFR SERPL CREATININE-BSD FRML MDRD: 75 ML/MIN/1.73SQ M
GLUCOSE SERPL-MCNC: 102 MG/DL (ref 65–140)
HAV IGM SER QL: NORMAL
HBV CORE IGM SER QL: NORMAL
HBV SURFACE AG SER QL: NORMAL
HCT VFR BLD AUTO: 30.7 % (ref 36.5–49.3)
HCV AB SER QL: NORMAL
HGB BLD-MCNC: 10.6 G/DL (ref 12–17)
L PNEUMO1 AG UR QL IA.RAPID: NEGATIVE
MCH RBC QN AUTO: 28.9 PG (ref 26.8–34.3)
MCHC RBC AUTO-ENTMCNC: 34.5 G/DL (ref 31.4–37.4)
MCV RBC AUTO: 84 FL (ref 82–98)
NRBC BLD AUTO-RTO: 0 /100 WBCS
P AXIS: 75 DEGREES
PLATELET # BLD AUTO: 528 THOUSANDS/UL (ref 149–390)
PMV BLD AUTO: 9 FL (ref 8.9–12.7)
POTASSIUM SERPL-SCNC: 3.8 MMOL/L (ref 3.5–5.3)
PR INTERVAL: 156 MS
PROCALCITONIN SERPL-MCNC: 0.98 NG/ML
PROT SERPL-MCNC: 7.2 G/DL (ref 6.4–8.2)
QRS AXIS: -29 DEGREES
QRSD INTERVAL: 84 MS
QT INTERVAL: 324 MS
QTC INTERVAL: 426 MS
RBC # BLD AUTO: 3.67 MILLION/UL (ref 3.88–5.62)
S PNEUM AG UR QL: NEGATIVE
SODIUM SERPL-SCNC: 134 MMOL/L (ref 136–145)
T WAVE AXIS: 77 DEGREES
VENTRICULAR RATE: 104 BPM
WBC # BLD AUTO: 11.02 THOUSAND/UL (ref 4.31–10.16)

## 2022-06-15 PROCEDURE — 80053 COMPREHEN METABOLIC PANEL: CPT

## 2022-06-15 PROCEDURE — 84145 PROCALCITONIN (PCT): CPT

## 2022-06-15 PROCEDURE — 93010 ELECTROCARDIOGRAM REPORT: CPT | Performed by: INTERNAL MEDICINE

## 2022-06-15 PROCEDURE — 87205 SMEAR GRAM STAIN: CPT

## 2022-06-15 PROCEDURE — 80074 ACUTE HEPATITIS PANEL: CPT | Performed by: PHYSICIAN ASSISTANT

## 2022-06-15 PROCEDURE — 99232 SBSQ HOSP IP/OBS MODERATE 35: CPT | Performed by: PHYSICIAN ASSISTANT

## 2022-06-15 PROCEDURE — 76705 ECHO EXAM OF ABDOMEN: CPT

## 2022-06-15 PROCEDURE — 85027 COMPLETE CBC AUTOMATED: CPT

## 2022-06-15 PROCEDURE — 99223 1ST HOSP IP/OBS HIGH 75: CPT | Performed by: INTERNAL MEDICINE

## 2022-06-15 PROCEDURE — 87070 CULTURE OTHR SPECIMN AEROBIC: CPT

## 2022-06-15 PROCEDURE — 94760 N-INVAS EAR/PLS OXIMETRY 1: CPT

## 2022-06-15 RX ORDER — TRAMADOL HYDROCHLORIDE 50 MG/1
50 TABLET ORAL ONCE
Status: COMPLETED | OUTPATIENT
Start: 2022-06-15 | End: 2022-06-15

## 2022-06-15 RX ADMIN — AMLODIPINE BESYLATE 5 MG: 5 TABLET ORAL at 08:40

## 2022-06-15 RX ADMIN — TRAMADOL HYDROCHLORIDE 50 MG: 50 TABLET, COATED ORAL at 19:25

## 2022-06-15 RX ADMIN — ENOXAPARIN SODIUM 40 MG: 100 INJECTION SUBCUTANEOUS at 08:35

## 2022-06-15 RX ADMIN — CEFTRIAXONE 1000 MG: 1 INJECTION, SOLUTION INTRAVENOUS at 17:11

## 2022-06-15 RX ADMIN — ACETAMINOPHEN 650 MG: 325 TABLET ORAL at 19:22

## 2022-06-15 NOTE — PROGRESS NOTES
[place]  Progress Note - Zack Hampton 1955, 78 y o  male MRN: [id number]  Unit/Bed#: -01 Encounter: [id number]  Primary Care Provider: Yue Zuluaga MD   Date and time admitted to hospital: 6/14/2022  3:23 PM    * Right lower lobe pneumonia  Assessment & Plan  · 6/13 Presented with fevers/chills, cough, shortness of breath, general malaise x 1 week, T-max 103° at home  · SIRS:  Tachycardia (resolved after fluids), Temp 100 4 - no longer meeting sepsis criteria   · WBC 11   · LA 1 5  · CXR-Nonspecific infiltrate in the right lower lung  Presumed infectious process, more likely bacterial pneumonia  · Procalcitonin 0 98  · COVID/FLU/RSV negative  · Urinary antigens negative  · Sputum GS/culture pending  · BC x 2 pending   · IV ceftriaxone initiated 6/14, continue  · received 1L IVF in ED  · Respiratory protocol, aspiration precautions, airway clearance, incentive spirometry  · Nebs p r n   Wheezing/SOB  · Monitor WBC/fevers     Unintentional weight loss  Assessment & Plan  · Pts wife reports he has had unintentional weight loss of 13lbs in 3 weeks   · Hx of prostate cancer, received radiation 10/21, follows with radiation/oncology regularly   · + cologuard 4/2022, was scheduled for colonoscopy 6/8/22 but missed appointment 2/2 current pneumonia  · On admission, pt had elevated LFTs and scleral icterus on exam  · Pt drinks 1/5-1/2 of alcohol per week   · GI recommendations appreciated     Transaminitis  Assessment & Plan  · ,  with scleral icterus   · Pt states he drinks 1/5-1/2 of alcohol per week   · Hepatitis panel pending   · GI consulted     Smoking  Assessment & Plan  · Heavy tobacco use (40-pack-year)   · Pt states chronic SOB and wheezing with exertion   · Possible underlying COPD  · Will have pt follow up with Pulmonology outpatient for PFTs  · Started pt on Duo-Nebs prn     Sepsis ([address] )  Assessment & Plan  · POA secondary to pneumonia  · Evidenced by fever, tachycardia, tachypnea  · Further management under pneumonia    Hyponatremia  Assessment & Plan  · Sodium 132 on admission, likely in setting of poor p o  intake as patient has not had appetite  · received 1L IVF in ED  · 6/15 sodium 134    Prostate cancer [facility])  Assessment & Plan  · Patient completed radiation treatment 10/2021  · Follows up outpatient, with recent satisfactory PSA  · Continue routine outpatient follow-up    Essential hypertension  Assessment & Plan  · Continue home amlodipine 5 mg daily  ·  on admission      VTE Pharmacologic Prophylaxis: VTE Score: 5 Moderate Risk (Score 3-4) - Pharmacological DVT Prophylaxis Ordered: enoxaparin (Lovenox)  Patient Centered Rounds: I performed bedside rounds with nursing staff today  Discussions with Specialists or Other Care Team Provider: GI    Education and Discussions with Family / Patient: Updated  (wife) at bedside  Time Spent for Care: 30 minutes  More than 50% of total time spent on counseling and coordination of care as described above  Current Length of Stay: 1 day(s)  Current Patient Status: Inpatient   Certification Statement: The patient will continue to require additional inpatient hospital stay due to pneumonia requiring IV antibiotic therapy  Discharge Plan: Anticipate discharge in 24-48 hrs to home  Code Status: Level 1 - Full Code    Subjective:   Pt states his breathing and coughing have improved since yesterday  Pt was febrile overnight (Tmax 101F)  Pts wife states he has chronic SOB/DNE with wheezing  Pt is a chronic smoker and may have some underlying COPD in addition to pneumonia  Pts wife also states he has had unintentional weight loss of 13lbs in 3 weeks  Pt does have hx of prostate cancer and had +cologuard 4/2022  Also had elevated LFTs on admission with new scleral icterus present on exam, pt states he drinks 1/5-1/2 of alcohol per week  Pt denies abdominal pain, n/v/d   Pt denies chest pain, dizziness  Objective:     Vitals:   Temp (24hrs), Av 7 °F (37 6 °C), Min:98 °F (36 7 °C), Max:101 °F (38 3 °C)    Temp:  [98 °F (36 7 °C)-101 °F (38 3 °C)] 98 °F (36 7 °C)  HR:  [] 94  Resp:  [14-22] 18  BP: ()/(60-76) 110/68  SpO2:  [89 %-95 %] 93 %  Body mass index is 22 78 kg/m²  Input and Output Summary (last 24 hours): Intake/Output Summary (Last 24 hours) at 6/15/2022 1357  Last data filed at 2022 2300  Gross per 24 hour   Intake 50 ml   Output 100 ml   Net -50 ml       Physical Exam:   Physical Exam  Vitals reviewed  Constitutional:       General: He is awake  Appearance: He is normal weight  Eyes:      General: Scleral icterus present  Cardiovascular:      Rate and Rhythm: Regular rhythm  Tachycardia present  Heart sounds: Normal heart sounds  Pulmonary:      Effort: Pulmonary effort is normal       Breath sounds: Examination of the right-lower field reveals decreased breath sounds  Decreased breath sounds present  No wheezing  Abdominal:      General: Abdomen is flat  There is no distension  Palpations: Abdomen is soft  Tenderness: There is no abdominal tenderness  There is no guarding  Musculoskeletal:         General: No swelling  Skin:     General: Skin is warm and dry  Neurological:      General: No focal deficit present  Mental Status: He is alert and oriented to person, place, and time     Psychiatric:         Mood and Affect: Mood normal          Behavior: Behavior normal          Additional Data:     Labs:  Results from last 7 days   Lab Units 06/15/22  0531 22  1439   WBC Thousand/uL 11 02* 11 04*   HEMOGLOBIN g/dL 10 6* 11 5*   HEMATOCRIT % 30 7* 34 2*   PLATELETS Thousands/uL 528* 494*   BANDS PCT %  --  5   LYMPHO PCT %  --  5*   MONO PCT %  --  2*   EOS PCT %  --  0     Results from last 7 days   Lab Units 06/15/22  0531   SODIUM mmol/L 134*   POTASSIUM mmol/L 3 8   CHLORIDE mmol/L 99*   CO2 mmol/L 22   BUN mg/dL 10 CREATININE mg/dL 1 02   ANION GAP mmol/L 13   CALCIUM mg/dL 8 6   ALBUMIN g/dL 2 2*   TOTAL BILIRUBIN mg/dL 0 90   ALK PHOS U/L 87   ALT U/L 121*   AST U/L 141*   GLUCOSE RANDOM mg/dL 102     Results from last 7 days   Lab Units 06/14/22  1547   INR  1 15             Results from last 7 days   Lab Units 06/15/22  0531 06/14/22  1553 06/14/22  1547   LACTIC ACID mmol/L  --   --  1 5   PROCALCITONIN ng/ml 0 98* 0 47*  --        Lines/Drains:  Invasive Devices  Report    Peripheral Intravenous Line  Duration           Peripheral IV 06/14/22 Distal;Left;Upper;Ventral (anterior) Antecubital <1 day              Imaging: Reviewed radiology reports from this admission including: chest xray    Recent Cultures (last 7 days):   Results from last 7 days   Lab Units 06/14/22  1942 06/14/22  1547   BLOOD CULTURE   --  Received in Microbiology Lab  Culture in Progress  Received in Microbiology Lab  Culture in Progress  LEGIONELLA URINARY ANTIGEN  Negative  --        Last 24 Hours Medication List:   Current Facility-Administered Medications   Medication Dose Route Frequency Provider Last Rate    acetaminophen  650 mg Oral Q6H PRN Laverda Frankel [place], PA-C      amLODIPine  5 mg Oral Daily Laverda Frankel [place], [place]      cefTRIAXone  1,000 mg Intravenous Q24H Laverda Frankel [place], PA-C      enoxaparin  40 mg Subcutaneous Daily [facility] Frank[facility] [place], [place]      ipratropium-albuterol  3 mL Nebulization TID PRN Lissette Mitchell PA-C          Today, Patient Was Seen By: Jc Rodriges PA-C    **Please Note: This note may have been constructed using a voice recognition system  **

## 2022-06-15 NOTE — CASE MANAGEMENT
Case Management Assessment & Discharge Planning Note    Patient name Helena Osorio  Location [address] 221/-13 MRN [id number]  : 1955 Date 6/15/2022       Current Admission Date: 2022  Current Admission Diagnosis:Right lower lobe pneumonia   Patient Active Problem List    Diagnosis Date Noted    Unintentional weight loss 06/15/2022    S[facility] ([facility] ) 06/15/2022    Right lower lobe pneumonia 2022    Hyponatremia 2022    Transaminitis 2022    Smoking 2021    Arthritis 2021    Prostate cancer ([facility] ) 2021    Elevated PSA 2021    Essential hypertension 2019    Cigarette nicotine dependence without complication       LOS (days): 1  Geometric Mean LOS (GMLOS) (days): 3 10  Days to GMLOS:2 2     OBJECTIVE:    Risk of Unplanned Readmission Score: 7 92         Current admission status: Inpatient       Preferred Pharmacy:   [facility] [id number] [place] [address]  [address]  [facility]  [address] [id number]  Phone: [phone] Fax: [phone]    Primary Care Provider: Telma Castro MD    Primary Insurance: MEDICARE  Secondary Insurance:     ASSESSMENT:  [address] Representative - Spouse   Primary Phone: [phone] (Home)               Advance Directives  Does patient have a [address] [place] [address]?: No  Was patient offered paperwork?: Yes (declined)  Does patient currently have a Health Care decision maker?: Yes, please see Health Care Proxy section  Does patient have Advance Directives?: No  Was patient offered paperwork?: Yes (declined)    Patient Information  Admitted from[de-identified] Home  Mental Status: Alert  During Assessment patient was accompanied by: Not accompanied during assessment  Assessment information provided by[de-identified] Patient  Primary Caregiver: Self  Support Systems: Spouse/significant other, Son  Home entry access options   Select all that apply : No steps to enter home  Type of Current Residence: 2 story home  Upon entering residence, is there a bedroom on the main floor (no further steps)?: No  A bedroom is located on the following floor levels of residence (select all that apply):: 2nd Floor  Upon entering residence, is there a bathroom on the main floor (no further steps)?: Yes  Number of steps to 2nd floor from main floor: One Flight  In the last 12 months, was there a time when you were not able to pay the mortgage or rent on time?: No  In the last 12 months, how many places have you lived?: 1  In the last 12 months, was there a time when you did not have a steady place to sleep or slept in a shelter (including now)?: No  Homeless/housing insecurity resource given?: N/A  Living Arrangements: Lives w/ Spouse/significant other, Lives w/ Son    Activities of Daily Living Prior to Admission  Functional Status: Independent  Completes ADLs independently?: Yes  Ambulates independently?: Yes  Does patient use assisted devices?: No  Does patient currently own DME?: No  Does patient have a history of Outpatient Therapy (PT/OT)?: No  Does the patient have a history of Short-Term Rehab?: No  Does patient have a history of HHC?: No  Does patient currently have [facility]?: No    Patient Information Continued  Does patient have prescription coverage?: Yes  Within the past 12 months, you worried that your food would run out before you got the money to buy more : Never true  Within the past 12 months, the food you bought just didn't last and you didn't have money to get more : Never true  Food insecurity resource given?: N/A  Does patient receive dialysis treatments?: No  Does patient have a history of substance abuse?: No  Does patient have a history of Mental Health Diagnosis?: No    Means of Transportation  Means of Transport to Appts[de-identified] Drives Self  In the past 12 months, has lack of transportation kept you from medical appointments or from getting medications?: No  In the past 12 months, has lack of transportation kept you from meetings, work, or from getting things needed for daily living?: No  Was application for public transport provided?: N/A    DISCHARGE DETAILS:    Discharge planning discussed with[de-identified] patient  Freedom of Choice: Yes  Comments - Freedom of Choice: no anticipated dc needs  CM contacted family/caregiver?: Yes  Were Treatment Team discharge recommendations reviewed with patient/caregiver?: Yes  Did patient/caregiver verbalize understanding of patient care needs?: Yes  Were patient/caregiver advised of the risks associated with not following Treatment Team discharge recommendations?: Yes    Contacts  Patient Contacts: Maia Marin (wife)  Relationship to Patient[de-identified] Family  Contact Method: In Person  Reason/Outcome: Discharge [address]         Is the patient interested in [facility] at discharge?: No    DME Referral Provided  Referral made for DME?: No    Treatment Team Recommendation: Home  Discharge Destination Plan[de-identified] Home  Transport at Discharge : Family     Additional Comments: Met with pt, pt's wife Maia Marin and son Shade Taylor to discuss the role of CM and to discuss any help pt may need prior to dc  Pt lives with his wife and son in a 2 story home with bed/bathroom on the 2nd floor; powder room on the 1st floor  No LETTY home  Pt performed ADL's indptly pta, no use of DME  NO hx of HHC or rehab  No hx of mental health or D&A treatment  Pt drives  Pt's preferred pharmacy is [facility]; pt reports not having a prescription plan  Pt drives  Pt's family will transport home at dc

## 2022-06-15 NOTE — CONSULTS
Consultation - [id number] [facility]     [facility] 78 y o  male MRN: [id number]  Unit/Bed#: -01 Encounter: [id number]    Consults    ASSESSMENT and PLAN    Elevated liver transaminases - elevated LFTs may be result of acute infection, i e  His presenting pneumonia  He may have underlying chronic liver disease secondary to years of high alcohol use  Rule out viral hepatitis  Less likely to be metabolic or autoimmune  Given weight loss and positive Cologuard test should also assess liver for any evidence of cirrhosis or metastatic disease  · Continue to trend LFTs  · Continue to treat underlying pneumonia  · Obtain ultrasound of the liver  · If all studies negative would obtain further testing for iron overload and alpha 1 antitrypsin  · If no resolution and diagnosis remains obscure may warrant liver biopsy in the future    Positive Cologuard test - positive Cologuard test will need to be addressed after recovery from pneumonia and discharge  Differential diagnosis includes colorectal cancer, benign adenomatous polyps, vascular lesions intermittently bleeding, false positive  · Plan outpatient colonoscopy when stable    Chief Complaint   Patient presents with    Fever - 9 weeks to 76 years     X1 week; was at pcp today and told to come to ed for eval as oxygen sat was low; possible pneumonia        Physician Requesting Consult: Fernando Thompson MD    HPI  [facility] is a 78y o  year old male who presents with pneumonia and found to have elevated LFTs  Previously had normal liver transaminases and denies any history of liver disease  No new medications  Acknowledges alcohol intake of about a fifth to a half of whiskey each weekend but denies daily drinking  No history of pruritus abdominal distension edema bleeding or confusion  Never been told he had abnormal LFTs before  No history of viral hepatitis  Acknowledges some recent weight loss    Was supposed to have screening colonoscopy in the next week or 2  Chart review revealed recent positive Cologuard test     Historical Information   Past Medical History:   Diagnosis Date    Arthritis     both knees    Prostate cancer ([address] )      Past Surgical History:   Procedure Laterality Date    CARPAL TUNNEL RELEASE  2015    Both wrists    TN PLACE RADIOTHER DEVICE/MARKER, PROSTATE N/A 7/29/2021    Procedure: INSERTION OF FIDUCIAL MARKER (TRANSRECTAL ULTRASOUND GUIDANCE), SPACEOAR;  Surgeon: Day Brink MD;  [facility]: [facility];  Service: Urology    TONSILLECTOMY       Social History   Social History     Substance and Sexual Activity   Alcohol Use Yes    Comment: drinks fifth of alcohol during week     Social History     Substance and Sexual Activity   Drug Use Yes    Types: Marijuana    Comment: daily     Social History     Tobacco Use   Smoking Status Current Every Day Smoker    Packs/day: 1 00    Years: 40 00    Pack years: 40 00    Types: Cigarettes   Smokeless Tobacco Never Used     Family History   Problem Relation Age of Onset    Breast cancer Mother 48    Substance Abuse Neg Hx     Mental illness Neg Hx        Meds/Allergies     Current Facility-Administered Medications   Medication Dose Route Frequency    acetaminophen (TYLENOL) tablet 650 mg  650 mg Oral Q6H PRN    amLODIPine (NORVASC) tablet 5 mg  5 mg Oral Daily    cefTRIAXone (ROCEPHIN) IVPB (premix in dextrose) 1,000 mg 50 mL  1,000 mg Intravenous Q24H    enoxaparin (LOVENOX) subcutaneous injection 40 mg  40 mg Subcutaneous Daily    ipratropium-albuterol (DUO-NEB) 0 5-2 5 mg/3 mL inhalation solution 3 mL  3 mL Nebulization TID PRN     Medications Prior to Admission   Medication    amLODIPine (NORVASC) 5 mg tablet    ibuprofen (ADVIL,MOTRIN) 100 MG tablet    sildenafil (VIAGRA) 100 mg tablet       No Known Allergies    PHYSICAL EXAM    Blood pressure 116/68, pulse 104, temperature 99 6 °F (37 6 °C), temperature source Oral, resp   rate 18, height 6' (1 829 m), weight 76 2 kg (168 lb), SpO2 92 %  Body mass index is 22 78 kg/m²  General Appearance: NAD, cooperative, alert  Eyes: Anicteric, PERRLA, EOMI  ENT:  Normocephalic, atraumatic, normal mucosa  Neck:  Supple, symmetrical, trachea midline  Resp:  Clear to auscultation bilaterally; no rales, rhonchi or wheezing; respirations unlabored   CV:  S1 S2, Regular rate and rhythm; no murmur, rub, or gallop  GI:  Soft, non-tender, non-distended; normal bowel sounds; no masses, no organomegaly, liver edge palpable just at the costal margin    Rectal: Deferred  Musculoskeletal: No cyanosis, clubbing or edema  Normal ROM  Skin:  No jaundice, rashes, or lesions   Heme/Lymph: No palpable cervical lymphadenopathy  Psych: Normal affect, good eye contact  Neuro: No gross deficits, AAOx3    Lab Results   Component Value Date    GLUCOSE 103 05/14/2014    CALCIUM 8 6 06/15/2022     05/14/2014    K 3 8 06/15/2022    CO2 22 06/15/2022    CL 99 (L) 06/15/2022    BUN 10 06/15/2022    CREATININE 1 02 06/15/2022     Lab Results   Component Value Date    WBC 11 02 (H) 06/15/2022    HGB 10 6 (L) 06/15/2022    HCT 30 7 (L) 06/15/2022    MCV 84 06/15/2022     (H) 06/15/2022     Lab Results   Component Value Date     (H) 06/15/2022     (H) 06/15/2022    ALKPHOS 87 06/15/2022    BILITOT 0 4 05/14/2014     No results found for: AMYLASE  No results found for: LIPASE  No results found for: IRON, TIBC, FERRITIN  Lab Results   Component Value Date    INR 1 15 06/14/2022       Imaging Studies: I have personally reviewed pertinent reports  EKG, Pathology, and Other Studies: I have personally reviewed pertinent reports  REVIEW OF SYSTEMS:    CONSTITUTIONAL: Denies any fever, chills, rigors, and weight loss  HEENT: No earache or tinnitus  Denies hearing loss or visual disturbances  CARDIOVASCULAR: No chest pain or palpitations     RESPIRATORY: Denies any cough, hemoptysis, shortness of breath or dyspnea on exertion  GASTROINTESTINAL: As noted in the History of Present Illness  GENITOURINARY: No problems with urination  Denies any hematuria or dysuria  NEUROLOGIC: No dizziness or vertigo, denies headaches  MUSCULOSKELETAL: Denies any muscle or joint pain  SKIN: Denies skin rashes or itching  ENDOCRINE: Denies excessive thirst  Denies intolerance to heat or cold  PSYCHOSOCIAL: Denies depression or anxiety  Denies any recent memory loss

## 2022-06-15 NOTE — NURSING NOTE
Patient asked about medication or something that can be ordered for his arthritis pain of his knees and wrists  Messaged Leighann [place] of patient's request   Offered Tylenol to patient but patient's wife insists that it doesn't really work for him

## 2022-06-15 NOTE — PLAN OF CARE
Problem: RESPIRATORY - ADULT  Goal: Achieves optimal ventilation and oxygenation  Description: INTERVENTIONS:  - Assess for changes in respiratory status  - Assess for changes in mentation and behavior  - Position to facilitate oxygenation and minimize respiratory effort  - Oxygen administered by appropriate delivery if ordered  - Initiate smoking cessation education as indicated  - Encourage broncho-pulmonary hygiene including cough, deep breathe, Incentive Spirometry  - Assess the need for suctioning and aspirate as needed  - Assess and instruct to report SOB or any respiratory difficulty  - Respiratory Therapy support as indicated  Outcome: Progressing     Problem: INFECTION - ADULT  Goal: Absence or prevention of progression during hospitalization  Description: INTERVENTIONS:  - Assess and monitor for signs and symptoms of infection  - Monitor lab/diagnostic results  - Monitor all insertion sites, i e  indwelling lines, tubes, and drains  - Monitor endotracheal if appropriate and nasal secretions for changes in amount and color  - [place] appropriate cooling/warming therapies per order  - Administer medications as ordered  - Instruct and encourage patient and family to use good hand hygiene technique  - Identify and instruct in appropriate isolation precautions for identified infection/condition  Outcome: Progressing  Goal: Absence of fever/infection during neutropenic period  Description: INTERVENTIONS:  - Monitor WBC    Outcome: Progressing

## 2022-06-15 NOTE — ASSESSMENT & PLAN NOTE
· Pts wife reports he has had unintentional weight loss of 13lbs in 3 weeks   · Hx of prostate cancer, received radiation 10/21, follows with radiation/oncology regularly   · + cologuard 4/2022, was scheduled for colonoscopy 6/8/22 but missed appointment 2/2 current pneumonia  · On admission, pt had elevated LFTs and scleral icterus on exam  · Pt drinks 1/5-1/2 of alcohol per week   · GI recommendations appreciated

## 2022-06-15 NOTE — PLAN OF CARE
Problem: PAIN - ADULT  Goal: Verbalizes/displays adequate comfort level or baseline comfort level  Description: Interventions:  - Encourage patient to monitor pain and request assistance  - Assess pain using appropriate pain scale  - Administer analgesics based on type and severity of pain and evaluate response  - Implement non-pharmacological measures as appropriate and evaluate response  - Consider cultural and social influences on pain and pain management  - Notify physician/advanced practitioner if interventions unsuccessful or patient reports new pain  Outcome: Progressing     Problem: INFECTION - ADULT  Goal: Absence or prevention of progression during hospitalization  Description: INTERVENTIONS:  - Assess and monitor for signs and symptoms of infection  - Monitor lab/diagnostic results  - Monitor all insertion sites, i e  indwelling lines, tubes, and drains  - Monitor endotracheal if appropriate and nasal secretions for changes in amount and color  - [place] appropriate cooling/warming therapies per order  - Administer medications as ordered  - Instruct and encourage patient and family to use good hand hygiene technique  - Identify and instruct in appropriate isolation precautions for identified infection/condition  Outcome: Progressing  Goal: Absence of fever/infection during neutropenic period  Description: INTERVENTIONS:  - Monitor WBC    Outcome: Progressing     Problem: SAFETY ADULT  Goal: Patient will remain free of falls  Description: INTERVENTIONS:  - Educate patient/family on patient safety including physical limitations  - Instruct patient to call for assistance with activity   - Consult OT/PT to assist with strengthening/mobility   - Keep Call bell within reach  - Keep bed low and locked with side rails adjusted as appropriate  - Keep care items and personal belongings within reach  - Initiate and maintain comfort rounds  - Make Fall Risk Sign visible to staff  - Offer Toileting every 2 Hours, in advance of need  - Initiate/Maintain alarm  - Obtain necessary fall risk management equipment  - Apply yellow socks and bracelet for high fall risk patients  - Consider moving patient to room near nurses station  Outcome: Progressing  Goal: Maintain or return to baseline ADL function  Description: INTERVENTIONS:  -  Assess patient's ability to carry out ADLs; assess patient's baseline for ADL function and identify physical deficits which impact ability to perform ADLs (bathing, care of mouth/teeth, toileting, grooming, dressing, etc )  - Assess/evaluate cause of self-care deficits   - Assess range of motion  - Assess patient's mobility; develop plan if impaired  - Assess patient's need for assistive devices and provide as appropriate  - Encourage maximum independence but intervene and supervise when necessary  - Involve family in performance of ADLs  - Assess for home care needs following discharge   - Consider OT consult to assist with ADL evaluation and planning for discharge  - Provide patient education as appropriate  Outcome: Progressing  Goal: Maintains/Returns to pre admission functional level  Description: INTERVENTIONS:  - Perform BMAT or MOVE assessment daily    - Set and communicate daily mobility goal to care team and patient/family/caregiver  - Collaborate with rehabilitation services on mobility goals if consulted  - Perform Range of Motion 4 times a day  - Reposition patient every 2 hours    - Dangle patient 4 times a day  - Stand patient 4 times a day  - Ambulate patient 4 times a day  - Out of bed to chair 4 times a day   - Out of bed for meals 3 times a day  - Out of bed for toileting  - Record patient progress and toleration of activity level   Outcome: Progressing     Problem: DISCHARGE PLANNING  Goal: Discharge to home or other facility with appropriate resources  Description: INTERVENTIONS:  - Identify barriers to discharge w/patient and caregiver  - Arrange for needed discharge resources and transportation as appropriate  - Identify discharge learning needs (meds, wound care, etc )  - Arrange for interpretive services to assist at discharge as needed  - Refer to Case Management Department for coordinating discharge planning if the patient needs post-hospital services based on physician/advanced practitioner order or complex needs related to functional status, cognitive ability, or social support system  Outcome: Progressing     Problem: Knowledge Deficit  Goal: Patient/family/caregiver demonstrates understanding of disease process, treatment plan, medications, and discharge instructions  Description: Complete learning assessment and assess knowledge base    Interventions:  - Provide teaching at level of understanding  - Provide teaching via preferred learning methods  Outcome: Progressing     Problem: RESPIRATORY - ADULT  Goal: Achieves optimal ventilation and oxygenation  Description: INTERVENTIONS:  - Assess for changes in respiratory status  - Assess for changes in mentation and behavior  - Position to facilitate oxygenation and minimize respiratory effort  - Oxygen administered by appropriate delivery if ordered  - Initiate smoking cessation education as indicated  - Encourage broncho-pulmonary hygiene including cough, deep breathe, Incentive Spirometry  - Assess the need for suctioning and aspirate as needed  - Assess and instruct to report SOB or any respiratory difficulty  - Respiratory Therapy support as indicated  Outcome: Progressing

## 2022-06-15 NOTE — ASSESSMENT & PLAN NOTE
· POA secondary to pneumonia  · Evidenced by fever, tachycardia, tachypnea  · Further management under pneumonia

## 2022-06-15 NOTE — ASSESSMENT & PLAN NOTE
· Heavy tobacco use (40-pack-year)   · Pt states chronic SOB and wheezing with exertion   · Possible underlying COPD  · Will have pt follow up with Pulmonology outpatient for PFTs  · Started pt on Duo-Nebs prn

## 2022-06-16 PROBLEM — J96.01 ACUTE RESPIRATORY FAILURE WITH HYPOXIA (HCC): Status: ACTIVE | Noted: 2022-06-16

## 2022-06-16 PROBLEM — E43 SEVERE PROTEIN-CALORIE MALNUTRITION (HCC): Status: ACTIVE | Noted: 2022-06-16

## 2022-06-16 PROBLEM — R09.02 HYPOXEMIA: Status: ACTIVE | Noted: 2022-06-16

## 2022-06-16 LAB
ALBUMIN SERPL BCP-MCNC: 2.1 G/DL (ref 3.5–5)
ALP SERPL-CCNC: 89 U/L (ref 46–116)
ALT SERPL W P-5'-P-CCNC: 104 U/L (ref 12–78)
ANION GAP SERPL CALCULATED.3IONS-SCNC: 13 MMOL/L (ref 4–13)
ANION GAP SERPL CALCULATED.3IONS-SCNC: 9 MMOL/L (ref 4–13)
AST SERPL W P-5'-P-CCNC: 115 U/L (ref 5–45)
BASOPHILS # BLD AUTO: 0.04 THOUSANDS/ΜL (ref 0–0.1)
BASOPHILS NFR BLD AUTO: 0 % (ref 0–1)
BILIRUB DIRECT SERPL-MCNC: 0.33 MG/DL (ref 0–0.2)
BILIRUB SERPL-MCNC: 1 MG/DL (ref 0.2–1)
BUN SERPL-MCNC: 10 MG/DL (ref 5–25)
BUN SERPL-MCNC: 11 MG/DL (ref 5–25)
CALCIUM SERPL-MCNC: 8.2 MG/DL (ref 8.3–10.1)
CALCIUM SERPL-MCNC: 8.7 MG/DL (ref 8.3–10.1)
CHLORIDE SERPL-SCNC: 94 MMOL/L (ref 100–108)
CHLORIDE SERPL-SCNC: 95 MMOL/L (ref 100–108)
CO2 SERPL-SCNC: 22 MMOL/L (ref 21–32)
CO2 SERPL-SCNC: 25 MMOL/L (ref 21–32)
CREAT SERPL-MCNC: 1.03 MG/DL (ref 0.6–1.3)
CREAT SERPL-MCNC: 1.03 MG/DL (ref 0.6–1.3)
EOSINOPHIL # BLD AUTO: 0.08 THOUSAND/ΜL (ref 0–0.61)
EOSINOPHIL NFR BLD AUTO: 1 % (ref 0–6)
ERYTHROCYTE [DISTWIDTH] IN BLOOD BY AUTOMATED COUNT: 14.6 % (ref 11.6–15.1)
GFR SERPL CREATININE-BSD FRML MDRD: 74 ML/MIN/1.73SQ M
GFR SERPL CREATININE-BSD FRML MDRD: 74 ML/MIN/1.73SQ M
GLUCOSE SERPL-MCNC: 107 MG/DL (ref 65–140)
GLUCOSE SERPL-MCNC: 94 MG/DL (ref 65–140)
HCT VFR BLD AUTO: 29.6 % (ref 36.5–49.3)
HGB BLD-MCNC: 10.6 G/DL (ref 12–17)
IMM GRANULOCYTES # BLD AUTO: 0.16 THOUSAND/UL (ref 0–0.2)
IMM GRANULOCYTES NFR BLD AUTO: 2 % (ref 0–2)
LYMPHOCYTES # BLD AUTO: 0.84 THOUSANDS/ΜL (ref 0.6–4.47)
LYMPHOCYTES NFR BLD AUTO: 8 % (ref 14–44)
MCH RBC QN AUTO: 29.6 PG (ref 26.8–34.3)
MCHC RBC AUTO-ENTMCNC: 35.8 G/DL (ref 31.4–37.4)
MCV RBC AUTO: 83 FL (ref 82–98)
MONOCYTES # BLD AUTO: 0.66 THOUSAND/ΜL (ref 0.17–1.22)
MONOCYTES NFR BLD AUTO: 6 % (ref 4–12)
MRSA NOSE QL CULT: NORMAL
NEUTROPHILS # BLD AUTO: 8.76 THOUSANDS/ΜL (ref 1.85–7.62)
NEUTS SEG NFR BLD AUTO: 83 % (ref 43–75)
NRBC BLD AUTO-RTO: 0 /100 WBCS
PLATELET # BLD AUTO: 652 THOUSANDS/UL (ref 149–390)
PMV BLD AUTO: 8.9 FL (ref 8.9–12.7)
POTASSIUM SERPL-SCNC: 3.5 MMOL/L (ref 3.5–5.3)
POTASSIUM SERPL-SCNC: 3.6 MMOL/L (ref 3.5–5.3)
PROT SERPL-MCNC: 7.8 G/DL (ref 6.4–8.2)
RBC # BLD AUTO: 3.58 MILLION/UL (ref 3.88–5.62)
SODIUM SERPL-SCNC: 128 MMOL/L (ref 136–145)
SODIUM SERPL-SCNC: 130 MMOL/L (ref 136–145)
WBC # BLD AUTO: 10.54 THOUSAND/UL (ref 4.31–10.16)

## 2022-06-16 PROCEDURE — 80048 BASIC METABOLIC PNL TOTAL CA: CPT | Performed by: PHYSICIAN ASSISTANT

## 2022-06-16 PROCEDURE — 99232 SBSQ HOSP IP/OBS MODERATE 35: CPT | Performed by: PHYSICIAN ASSISTANT

## 2022-06-16 PROCEDURE — 85025 COMPLETE CBC W/AUTO DIFF WBC: CPT | Performed by: PHYSICIAN ASSISTANT

## 2022-06-16 PROCEDURE — 80076 HEPATIC FUNCTION PANEL: CPT | Performed by: PHYSICIAN ASSISTANT

## 2022-06-16 PROCEDURE — 99232 SBSQ HOSP IP/OBS MODERATE 35: CPT | Performed by: INTERNAL MEDICINE

## 2022-06-16 RX ORDER — TRAMADOL HYDROCHLORIDE 50 MG/1
50 TABLET ORAL EVERY 6 HOURS PRN
Status: DISCONTINUED | OUTPATIENT
Start: 2022-06-16 | End: 2022-06-17 | Stop reason: HOSPADM

## 2022-06-16 RX ORDER — GUAIFENESIN 600 MG
600 TABLET, EXTENDED RELEASE 12 HR ORAL EVERY 12 HOURS SCHEDULED
Status: DISCONTINUED | OUTPATIENT
Start: 2022-06-16 | End: 2022-06-17 | Stop reason: HOSPADM

## 2022-06-16 RX ADMIN — CEFTRIAXONE 1000 MG: 1 INJECTION, SOLUTION INTRAVENOUS at 17:59

## 2022-06-16 RX ADMIN — DICLOFENAC SODIUM 2 G: 10 GEL TOPICAL at 13:50

## 2022-06-16 RX ADMIN — ENOXAPARIN SODIUM 40 MG: 100 INJECTION SUBCUTANEOUS at 09:46

## 2022-06-16 RX ADMIN — DICLOFENAC SODIUM 2 G: 10 GEL TOPICAL at 18:01

## 2022-06-16 RX ADMIN — GUAIFENESIN 600 MG: 600 TABLET ORAL at 05:29

## 2022-06-16 RX ADMIN — GUAIFENESIN 600 MG: 600 TABLET ORAL at 21:14

## 2022-06-16 RX ADMIN — DICLOFENAC SODIUM 2 G: 10 GEL TOPICAL at 22:55

## 2022-06-16 RX ADMIN — TRAMADOL HYDROCHLORIDE 50 MG: 50 TABLET, COATED ORAL at 13:50

## 2022-06-16 NOTE — ASSESSMENT & PLAN NOTE
· 6/13 Presented with fevers/chills, cough, shortness of breath, general malaise x 1 week, T-max 103° at home  · SIRS:  Tachycardia (resolved after fluids), Tmax 103 1F 6/15  · WBC 10 5  · LA 1 5  · Blood cultures no growth at 24hrs  · Monitor WBC/fever  · CXR-Nonspecific infiltrate in the right lower lung  Presumed infectious process, more likely bacterial pneumonia  · Procalcitonin 0 98  · COVID/FLU/RSV negative  · Urinary antigens negative  · Sputum GS/culture showed no bacteria   · 6/14 started IV ceftriaxone, continue  · 6/16 placed on NC 1L for SPO2 85-88%, now at 93%  · Respiratory protocol, aspiration precautions, airway clearance, incentive spirometry  · Nebs p r n   Wheezing/SOB

## 2022-06-16 NOTE — ASSESSMENT & PLAN NOTE
· Sodium 132 on admission, likely in setting of poor p o  intake as patient has not had appetite  · Also with chronic alcohol use may be contributing  · received 1L IVF in ED  · 6/16 sodium 130   · Consulted nutrition to improve intake   · Will trend BMP

## 2022-06-16 NOTE — PROGRESS NOTES
Yuki Reyes  [id number]    5 y o   male      ASSESSMENT and PLAN      Elevated liver transaminases - elevated LFTs most likely result of pneumonia/acute infection  He may have underlying chronic liver disease secondary to years of high alcohol use  Viral hepatitis profile negative  Less likely to be metabolic or autoimmune  Ultrasound negative for any signs of metastatic disease or biliary tract pathology  · Continue to trend LFTs  · Check iron panel and alpha 1 antitrypsin  · Continue to treat underlying pneumonia  · If no resolution and diagnosis remains obscure may warrant liver biopsy in the future     Positive Cologuard test - positive Cologuard test will need to be addressed after recovery from pneumonia and discharge  Differential diagnosis includes colorectal cancer, benign adenomatous polyps, vascular lesions intermittently bleeding, false positive  · Plan outpatient colonoscopy when stable      Chief Complaint   Patient presents with    Fever - 9 weeks to 74 years     X1 week; was at pcp today and told to come to ed for eval as oxygen sat was low; possible pneumonia        SUBJECTIVE/HPI feels well  No GI complaints  Discussed LFTs, hepatitis profile and normal ultrasound      /68 (BP Location: Left arm)   Pulse 94   Temp 98 5 °F (36 9 °C) (Oral)   Resp 13   Ht 6' (1 829 m)   Wt 75 9 kg (167 lb 6 4 oz)   SpO2 93%   BMI 22 70 kg/m²     PHYSICALEXAM  General appearance: alert, appears stated age and cooperative  Head: Normocephalic, without obvious abnormality, atraumatic  Lungs: clear to auscultation bilaterally  Heart: regular rate and rhythm, S1, S2 normal, no murmur, click, rub or gallop  Abdomen: soft, non-tender; bowel sounds normal; no masses,  no organomegaly  Extremities: extremities normal, atraumatic, no cyanosis or edema  Neurologic: Grossly normal    Lab Results   Component Value Date    GLUCOSE 103 05/14/2014    CALCIUM 8 7 06/16/2022     05/14/2014    K 3 5 06/16/2022    CO2 22 06/16/2022    CL 95 (L) 06/16/2022    BUN 10 06/16/2022    CREATININE 1 03 06/16/2022     Lab Results   Component Value Date    WBC 10 54 (H) 06/16/2022    HGB 10 6 (L) 06/16/2022    HCT 29 6 (L) 06/16/2022    MCV 83 06/16/2022     (H) 06/16/2022     Lab Results   Component Value Date     (H) 06/16/2022     (H) 06/16/2022    ALKPHOS 89 06/16/2022    BILITOT 0 4 05/14/2014     No results found for: AMYLASE  No results found for: LIPASE  No results found for: IRON, TIBC, FERRITIN  Lab Results   Component Value Date    INR 1 15 06/14/2022       Counseling / Coordination of Care  Total floor / unit time spent today 20 minutes

## 2022-06-16 NOTE — ASSESSMENT & PLAN NOTE
· Pts wife reports unintentional weight loss of 13lbs in 3 weeks   · Hx of prostate cancer, received radiation 10/21, follows with radiation/oncology regularly   · + cologuard 4/2022, was scheduled for colonoscopy 6/8/22 but missed appointment 2/2 current pneumonia  · On admission, pt had elevated LFTs  · Pt drinks 1/5-1/2 of alcohol per week   · RUQ u/s normal   · GI recommendations appreciated   · F/u outpatient for colonoscopy   · Appreciate nutrition recommendations to add Ensure BID

## 2022-06-16 NOTE — CASE MANAGEMENT
Case Management Discharge Planning Note    Patient name Jayden Pan  Location [address] 221/-[id number] MRN [id number]  : 1955 Date 2022       Current Admission Date: 2022  Current Admission Diagnosis:Right lower lobe pneumonia   Patient Active Problem List    Diagnosis Date Noted    Acute respiratory failure with hypoxia ([facility] ) 2022    Unintentional weight loss 06/15/2022    Sepsis ([facility] ) 06/15/2022    Right lower lobe pneumonia 2022    Hyponatremia 2022    Transaminitis 2022    Smoking 2021    Arthritis 2021    Prostate cancer ([facility] ) 2021    Elevated PSA 2021    Essential hypertension 2019    Cigarette nicotine dependence without complication       LOS (days): 2  Geometric Mean LOS (GMLOS) (days): 3 10  Days to GMLOS:1 2     OBJECTIVE:  Risk of Unplanned Readmission Score: 8 24         Current admission status: Inpatient   Preferred Pharmacy:   [facility] [address] [place] [address]  18  [facility]  [address] [place] [id number]  Phone: [phone] Fax: [phone]    Primary Care Provider: Keanu Burr MD    Primary Insurance: MEDICARE  Secondary Insurance:     DISCHARGE DETAILS:       IMM Given (Date):: 22  IMM Given to[de-identified] Patient

## 2022-06-16 NOTE — MALNUTRITION/BMI
This medical record reflects one or more clinical indicators suggestive of malnutrition and/or morbid obesity  Malnutrition Findings:   Adult Malnutrition type: Chronic illness  Adult Degree of Malnutrition: Other severe protein calorie malnutrition  Malnutrition Characteristics: Fat loss, Muscle loss, Inadequate energy, Weight loss                  360 Statement: Pt pressents with severe protein calorie malnutrition as evidenced by 18 lb wt loss in 10 weeks ( 4/8/22 185 lbs, 6/16/22 168 lbs), <75% po intake > 1 month, sunken orbitals and temporal depressions  Treat with regular diet and Ensure Enlive Vanilla BID  BMI Findings: Body mass index is 22 7 kg/m²  See Nutrition note dated 6/16/22 for additional details  Completed nutrition assessment is viewable in the nutrition documentation

## 2022-06-16 NOTE — ASSESSMENT & PLAN NOTE
· 6/13 Presented with fevers/chills, cough, shortness of breath, general malaise x 1 week, T-max 103° at home  · WBC 8 35  · LA 1 5  · Blood cultures no growth at 24hrs  · CXR-Nonspecific infiltrate in the right lower lung  Presumed infectious process, more likely bacterial pneumonia  · Procalcitonin 0 98  · COVID/FLU/RSV negative  · Urinary antigens negative  · Sputum GS/culture showed no bacteria   · 6/14 started IV ceftriaxone  · 6/16 placed on NC 1L for SPO2 85-88%, stable at 93%  · 6/17 ambulatory pulse ox completed prior to discharge    Patient requires 3 L nasal cannula on discharge with exertion  · Discharge on oral cefdinir to complete antibiotic course

## 2022-06-16 NOTE — ASSESSMENT & PLAN NOTE
· ,  on admission   · LFTs downtrending   · Pt states he drinks 1/5-1/2 of alcohol per week   · Hepatitis panel negative   · RUQ U/S normal   · GI recommendations appreciated  · Alpha-1 Antitrypsin and iron panel pending

## 2022-06-16 NOTE — ASSESSMENT & PLAN NOTE
· Patient started on 1 L nasal cannula due to hypoxia SpO2 85-88%  · In setting of possible underlying chronic obstructive pulmonary disease/pneumonia  · Continue O2 supplementation to maintain SpO2 > 90%  · Encourage incentive spirometry  · Continue treatment for pneumonia as noted above

## 2022-06-16 NOTE — ASSESSMENT & PLAN NOTE
· Sodium 132 on admission, likely in setting of poor p o  intake as patient has not had appetite  · Also with chronic alcohol use may be contributing  · received 1L IVF in ED  · 6/17 sodium stable 132  · Appreciate nutrition recommendations to add Ensure to diet BID  · Outpatient BMP in 1 week

## 2022-06-16 NOTE — ASSESSMENT & PLAN NOTE
· ,  on admission   · 6/16 LFTs downtrending   · Pt states he drinks 1/5-1/2 of alcohol per week   · Hepatitis panel negative   · RUQ U/S normal   · GI recommendations appreciated  · If RUQ U/S normal, proceed with Alpha-1 Antitrypsin and iron overload testing which likely can be followed as outpatient

## 2022-06-16 NOTE — ASSESSMENT & PLAN NOTE
· Pts wife reports unintentional weight loss of 13lbs in 3 weeks   · Hx of prostate cancer, received radiation 10/21, follows with radiation/oncology regularly   · + cologuard 4/2022, was scheduled for colonoscopy 6/8/22 but missed appointment 2/2 current pneumonia  · On admission, pt had elevated LFTs  · Pt drinks 1/5-1/2 of alcohol per week   · RUQ u/s normal   · GI recommendations appreciated   · F/u outpatient for colonoscopy   · Consulted nutrition to discuss improving diet intake

## 2022-06-16 NOTE — PROGRESS NOTES
[place]  Progress Note - Ewa Núñez 1955, 78 y o  male MRN: [id number]  Unit/Bed#: -01 Encounter: [id number]  Primary Care Provider: Ngoc Zapata MD   Date and time admitted to hospital: 6/14/2022  3:23 PM    * Right lower lobe pneumonia  Assessment & Plan  · 6/13 Presented with fevers/chills, cough, shortness of breath, general malaise x 1 week, T-max 103° at home  · SIRS:  Tachycardia (resolved after fluids), Tmax 103 1F 6/15  · WBC 10 5  · LA 1 5  · Blood cultures no growth at 24hrs  · Monitor WBC/fever  · CXR-Nonspecific infiltrate in the right lower lung  Presumed infectious process, more likely bacterial pneumonia  · Procalcitonin 0 98  · COVID/FLU/RSV negative  · Urinary antigens negative  · Sputum GS/culture showed no bacteria   · 6/14 started IV ceftriaxone, continue  · 6/16 placed on NC 1L for SPO2 85-88%, now at 93%  · Respiratory protocol, aspiration precautions, airway clearance, incentive spirometry  · Nebs p r n   Wheezing/SOB    Hypoxemia  Assessment & Plan  · Patient started on 1 L nasal cannula due to hypoxia SpO2 85-88%  · In setting of possible underlying chronic obstructive pulmonary disease/pneumonia  · Continue O2 supplementation to maintain SpO2 > 90%  · Encourage incentive spirometry  · Continue treatment for pneumonia as noted above    Sepsis ([address] )  Assessment & Plan  · POA secondary to pneumonia  · Evidenced by fever, tachycardia, tachypnea  · Further management under pneumonia    Unintentional weight loss  Assessment & Plan  · Pts wife reports unintentional weight loss of 13lbs in 3 weeks   · Hx of prostate cancer, received radiation 10/21, follows with radiation/oncology regularly   · + cologuard 4/2022, was scheduled for colonoscopy 6/8/22 but missed appointment 2/2 current pneumonia  · On admission, pt had elevated LFTs  · Pt drinks 1/5-1/2 of alcohol per week   · RUQ u/s normal   · GI recommendations appreciated   · F/u outpatient for colonoscopy   · Consulted nutrition to discuss improving diet intake     Transaminitis  Assessment & Plan  · ,  on admission   · 6/16 LFTs downtrending   · Pt states he drinks 1/5-1/2 of alcohol per week   · Hepatitis panel negative   · RUQ U/S normal   · GI recommendations appreciated  · If RUQ U/S normal, proceed with Alpha-1 Antitrypsin and iron overload testing which likely can be followed as outpatient    Hyponatremia  Assessment & Plan  · Sodium 132 on admission, likely in setting of poor p o  intake as patient has not had appetite  · Also with chronic alcohol use may be contributing  · received 1L IVF in ED  · 6/16 sodium 130   · Consulted nutrition to improve intake   · Will trend BMP    Smoking  Assessment & Plan  · Heavy tobacco use (40-pack-year)   · Pt states chronic SOB and wheezing with exertion   · Possible underlying COPD  · Will have pt follow up with Pulmonology outpatient for PFTs  · Started pt on Duo-Nebs prn     Prostate cancer [facility])  Assessment & Plan  · Patient completed radiation treatment 10/2021  · Follows up outpatient, with recent satisfactory PSA  · Continue routine outpatient follow-up    Essential hypertension  Assessment & Plan  · SBP 100s this morning  · Will hold amlodipine for now  · Monitor blood pressure    VTE Pharmacologic Prophylaxis: VTE Score: 5 High Risk (Score >/= 5) - Pharmacological DVT Prophylaxis Ordered: enoxaparin (Lovenox)  Sequential Compression Devices Ordered  Patient Centered Rounds: I performed bedside rounds with nursing staff today  Discussions with Specialists or Other Care Team Provider: Gastroenterology, CM     Education and Discussions with Family / Patient: Updated  (wife) at bedside  Time Spent for Care: 30 minutes  More than 50% of total time spent on counseling and coordination of care as described above      Current Length of Stay: 2 day(s)  Current Patient Status: Inpatient   Certification Statement: The patient will continue to require additional inpatient hospital stay due to fever, respiratory failure with hypoxia  Discharge Plan: Anticipate discharge tomorrow to home  Code Status: Level 1 - Full Code    Subjective:   Pt placed on supplemental O2 this morning (1L NC)  Pt states he notes no difference with NC, states coughing and SOB have improved  Pt was febrile overnight (Tmax 103 1F)  Pt denies chest pain, abd pain, n/v     Objective:     Vitals:   Temp (24hrs), Av 4 °F (34 1 °C), Min:48 2 °F (9 °C), Max:103 1 °F (39 5 °C)    Temp:  [48 2 °F (9 °C)-103 1 °F (39 5 °C)] 98 7 °F (37 1 °C)  HR:  [90-97] 97  Resp:  [13-25] 22  BP: (102-117)/(64-73) 102/64  SpO2:  [91 %-93 %] 93 %  Body mass index is 22 7 kg/m²  Input and Output Summary (last 24 hours): Intake/Output Summary (Last 24 hours) at 2022 1541  Last data filed at 2022 0501  Gross per 24 hour   Intake 240 ml   Output 100 ml   Net 140 ml       Physical Exam:   Physical Exam  Vitals and nursing note reviewed  Constitutional:       General: He is not in acute distress  Appearance: He is well-developed  HENT:      Head: Normocephalic and atraumatic  Eyes:      Conjunctiva/sclera: Conjunctivae normal    Cardiovascular:      Rate and Rhythm: Regular rhythm  Tachycardia present  Heart sounds: Normal heart sounds  No murmur heard  Pulmonary:      Effort: Pulmonary effort is normal  No respiratory distress  Breath sounds: Examination of the right-lower field reveals decreased breath sounds  Decreased breath sounds present  Abdominal:      General: Abdomen is flat  Bowel sounds are normal  There is no distension  Palpations: Abdomen is soft  Tenderness: There is no abdominal tenderness  There is no guarding  Musculoskeletal:         General: No swelling  Cervical back: Neck supple  Skin:     General: Skin is warm and dry  Neurological:      General: No focal deficit present        Mental Status: He is alert and oriented to person, place, and time  Psychiatric:         Mood and Affect: Mood normal          Behavior: Behavior normal          Thought Content: Thought content normal        Additional Data:     Labs:  Results from last 7 days   Lab Units 06/16/22  0459 06/15/22  0531 06/14/22  1439   WBC Thousand/uL 10 54*   < > 11 04*   HEMOGLOBIN g/dL 10 6*   < > 11 5*   HEMATOCRIT % 29 6*   < > 34 2*   PLATELETS Thousands/uL 652*   < > 494*   BANDS PCT %  --   --  5   NEUTROS PCT % 83*  --   --    LYMPHS PCT % 8*  --   --    LYMPHO PCT %  --   --  5*   MONOS PCT % 6  --   --    MONO PCT %  --   --  2*   EOS PCT % 1  --  0    < > = values in this interval not displayed  Results from last 7 days   Lab Units 06/16/22  0459   SODIUM mmol/L 130*   POTASSIUM mmol/L 3 5   CHLORIDE mmol/L 95*   CO2 mmol/L 22   BUN mg/dL 10   CREATININE mg/dL 1 03   ANION GAP mmol/L 13   CALCIUM mg/dL 8 7   ALBUMIN g/dL 2 1*   TOTAL BILIRUBIN mg/dL 1 00   ALK PHOS U/L 89   ALT U/L 104*   AST U/L 115*   GLUCOSE RANDOM mg/dL 94     Results from last 7 days   Lab Units 06/14/22  1547   INR  1 15             Results from last 7 days   Lab Units 06/15/22  0531 06/14/22  1553 06/14/22  1547   LACTIC ACID mmol/L  --   --  1 5   PROCALCITONIN ng/ml 0 98* 0 47*  --        Lines/Drains:  Invasive Devices  Report    Peripheral Intravenous Line  Duration           Peripheral IV 06/14/22 Distal;Left;Upper;Ventral (anterior) Antecubital 1 day              Imaging: Reviewed radiology reports from this admission including: chest xray and ultrasound(s)    Recent Cultures (last 7 days):   Results from last 7 days   Lab Units 06/15/22  0844 06/14/22  1942 06/14/22  1547   BLOOD CULTURE   --   --  No Growth at 24 hrs  No Growth at 24 hrs     SPUTUM CULTURE  Culture too young- will reincubate  --   --    GRAM STAIN RESULT  1+ Epithelial cells per low power field  Rare Polys  No bacteria seen  --   --    LEGIONELLA URINARY ANTIGEN   --  Negative  -- Last 24 Hours Medication List:   Current Facility-Administered Medications   Medication Dose Route Frequency Provider Last Rate    acetaminophen  650 mg Oral Q6H PRN Akash Graham PA-C      cefTRIAXone  1,000 mg Intravenous Q24H Akash Graham PA-C 1,000 mg (06/15/22 1711)    Diclofenac Sodium  2 g Topical 4x Daily Bearl Ayaka Quezada PA-C      enoxaparin  40 mg Subcutaneous Daily Lety Tellez      guaiFENesin  600 mg Oral Q12H [address] Adrianaindigo Pereira PA-C      ipratropium-albuterol  3 mL Nebulization TID PRN Akash Carr PA-C      traMADol  50 mg Oral Q6H PRN Abby Miles PA-C          Today, Patient Was Seen By: Abby Miles PA-C    **Please Note: This note may have been constructed using a voice recognition system  **

## 2022-06-16 NOTE — ASSESSMENT & PLAN NOTE
· Patient started on 1 L nasal cannula due to hypoxia SpO2 85-88%  · In setting of possible underlying chronic obstructive pulmonary disease/pneumonia  · Ambulatory referral to pulmonology placed due to concern for possibly undiagnosed chronic obstructive pulmonary disease due to tobacco use    Will also need outpatient PFTs  · Continue O2 supplementation to maintain SpO2 > 90%  · Encourage incentive spirometry  · Continue treatment for pneumonia as noted above  · Ambulatory pulse ox completed prior to discharge - patient required up to 3 L nasal cannula with exertion

## 2022-06-17 VITALS
DIASTOLIC BLOOD PRESSURE: 68 MMHG | HEART RATE: 97 BPM | BODY MASS INDEX: 22.48 KG/M2 | HEIGHT: 72 IN | TEMPERATURE: 98 F | SYSTOLIC BLOOD PRESSURE: 122 MMHG | RESPIRATION RATE: 18 BRPM | WEIGHT: 166 LBS | OXYGEN SATURATION: 92 %

## 2022-06-17 PROBLEM — A41.9 SEPSIS (HCC): Status: RESOLVED | Noted: 2022-06-15 | Resolved: 2022-06-17

## 2022-06-17 LAB
ALBUMIN SERPL BCP-MCNC: 2.1 G/DL (ref 3.5–5)
ALP SERPL-CCNC: 80 U/L (ref 46–116)
ALT SERPL W P-5'-P-CCNC: 109 U/L (ref 12–78)
ANION GAP SERPL CALCULATED.3IONS-SCNC: 10 MMOL/L (ref 4–13)
ANISOCYTOSIS BLD QL SMEAR: PRESENT
AST SERPL W P-5'-P-CCNC: 124 U/L (ref 5–45)
BACTERIA SPT RESP CULT: NORMAL
BASOPHILS # BLD MANUAL: 0 THOUSAND/UL (ref 0–0.1)
BASOPHILS NFR MAR MANUAL: 0 % (ref 0–1)
BILIRUB DIRECT SERPL-MCNC: 0.3 MG/DL (ref 0–0.2)
BILIRUB SERPL-MCNC: 0.8 MG/DL (ref 0.2–1)
BUN SERPL-MCNC: 10 MG/DL (ref 5–25)
CALCIUM SERPL-MCNC: 8.8 MG/DL (ref 8.3–10.1)
CHLORIDE SERPL-SCNC: 99 MMOL/L (ref 100–108)
CO2 SERPL-SCNC: 23 MMOL/L (ref 21–32)
CREAT SERPL-MCNC: 1.02 MG/DL (ref 0.6–1.3)
DME PARACHUTE DELIVERY DATE ACTUAL: NORMAL
DME PARACHUTE DELIVERY DATE EXPECTED: NORMAL
DME PARACHUTE DELIVERY DATE REQUESTED: NORMAL
DME PARACHUTE DELIVERY NOTE: NORMAL
DME PARACHUTE ITEM DESCRIPTION: NORMAL
DME PARACHUTE ORDER STATUS: NORMAL
DME PARACHUTE SUPPLIER NAME: NORMAL
DME PARACHUTE SUPPLIER PHONE: NORMAL
EOSINOPHIL # BLD MANUAL: 0.08 THOUSAND/UL (ref 0–0.4)
EOSINOPHIL NFR BLD MANUAL: 1 % (ref 0–6)
ERYTHROCYTE [DISTWIDTH] IN BLOOD BY AUTOMATED COUNT: 14.7 % (ref 11.6–15.1)
FERRITIN SERPL-MCNC: 1604 NG/ML (ref 8–388)
GFR SERPL CREATININE-BSD FRML MDRD: 75 ML/MIN/1.73SQ M
GLUCOSE SERPL-MCNC: 100 MG/DL (ref 65–140)
GRAM STN SPEC: NORMAL
HCT VFR BLD AUTO: 29.6 % (ref 36.5–49.3)
HGB BLD-MCNC: 10.3 G/DL (ref 12–17)
IRON SATN MFR SERPL: 20 % (ref 20–50)
IRON SERPL-MCNC: 29 UG/DL (ref 65–175)
LYMPHOCYTES # BLD AUTO: 1 THOUSAND/UL (ref 0.6–4.47)
LYMPHOCYTES # BLD AUTO: 12 % (ref 14–44)
MCH RBC QN AUTO: 28.9 PG (ref 26.8–34.3)
MCHC RBC AUTO-ENTMCNC: 34.8 G/DL (ref 31.4–37.4)
MCV RBC AUTO: 83 FL (ref 82–98)
MONOCYTES # BLD AUTO: 0.42 THOUSAND/UL (ref 0–1.22)
MONOCYTES NFR BLD: 5 % (ref 4–12)
NEUTROPHILS # BLD MANUAL: 6.85 THOUSAND/UL (ref 1.85–7.62)
NEUTS BAND NFR BLD MANUAL: 2 % (ref 0–8)
NEUTS SEG NFR BLD AUTO: 80 % (ref 43–75)
PLATELET # BLD AUTO: 714 THOUSANDS/UL (ref 149–390)
PLATELET BLD QL SMEAR: ABNORMAL
PMV BLD AUTO: 8.6 FL (ref 8.9–12.7)
POTASSIUM SERPL-SCNC: 3.6 MMOL/L (ref 3.5–5.3)
PROT SERPL-MCNC: 7.3 G/DL (ref 6.4–8.2)
RBC # BLD AUTO: 3.57 MILLION/UL (ref 3.88–5.62)
SODIUM SERPL-SCNC: 132 MMOL/L (ref 136–145)
TARGETS BLD QL SMEAR: PRESENT
TIBC SERPL-MCNC: 145 UG/DL (ref 250–450)
WBC # BLD AUTO: 8.35 THOUSAND/UL (ref 4.31–10.16)

## 2022-06-17 PROCEDURE — 83550 IRON BINDING TEST: CPT | Performed by: INTERNAL MEDICINE

## 2022-06-17 PROCEDURE — 82103 ALPHA-1-ANTITRYPSIN TOTAL: CPT | Performed by: INTERNAL MEDICINE

## 2022-06-17 PROCEDURE — 82728 ASSAY OF FERRITIN: CPT | Performed by: INTERNAL MEDICINE

## 2022-06-17 PROCEDURE — 94761 N-INVAS EAR/PLS OXIMETRY MLT: CPT

## 2022-06-17 PROCEDURE — 99239 HOSP IP/OBS DSCHRG MGMT >30: CPT | Performed by: PHYSICIAN ASSISTANT

## 2022-06-17 PROCEDURE — 85007 BL SMEAR W/DIFF WBC COUNT: CPT | Performed by: PHYSICIAN ASSISTANT

## 2022-06-17 PROCEDURE — 99232 SBSQ HOSP IP/OBS MODERATE 35: CPT | Performed by: NURSE PRACTITIONER

## 2022-06-17 PROCEDURE — 80076 HEPATIC FUNCTION PANEL: CPT | Performed by: INTERNAL MEDICINE

## 2022-06-17 PROCEDURE — 85027 COMPLETE CBC AUTOMATED: CPT | Performed by: PHYSICIAN ASSISTANT

## 2022-06-17 PROCEDURE — 83540 ASSAY OF IRON: CPT | Performed by: INTERNAL MEDICINE

## 2022-06-17 PROCEDURE — 80048 BASIC METABOLIC PNL TOTAL CA: CPT | Performed by: PHYSICIAN ASSISTANT

## 2022-06-17 RX ORDER — TRAMADOL HYDROCHLORIDE 50 MG/1
50 TABLET ORAL EVERY 6 HOURS PRN
Qty: 10 TABLET | Refills: 0 | Status: SHIPPED | OUTPATIENT
Start: 2022-06-17 | End: 2022-06-27

## 2022-06-17 RX ORDER — ALBUTEROL SULFATE 90 UG/1
2 AEROSOL, METERED RESPIRATORY (INHALATION) EVERY 6 HOURS PRN
Qty: 8.5 G | Refills: 0 | Status: SHIPPED | OUTPATIENT
Start: 2022-06-17

## 2022-06-17 RX ORDER — CEFDINIR 300 MG/1
300 CAPSULE ORAL EVERY 12 HOURS SCHEDULED
Qty: 8 CAPSULE | Refills: 0 | Status: SHIPPED | OUTPATIENT
Start: 2022-06-17 | End: 2022-06-21

## 2022-06-17 RX ADMIN — ENOXAPARIN SODIUM 40 MG: 100 INJECTION SUBCUTANEOUS at 09:03

## 2022-06-17 RX ADMIN — GUAIFENESIN 600 MG: 600 TABLET ORAL at 09:03

## 2022-06-17 RX ADMIN — DICLOFENAC SODIUM 2 G: 10 GEL TOPICAL at 09:13

## 2022-06-17 NOTE — RESPIRATORY THERAPY NOTE
Home Oxygen Qualifying Test     Patient name: Sophie Tovar        : 1955   Date of Test:  2022  Diagnosis:    Home Oxygen Test:    **Medicare Guidelines require item(s) 1-5 on all ambulatory patients or 1 and 2 on non-ambulatory patients  1  Baseline SPO2 on Room Air at rest 91 %   a  If <= 88% on Room Air add O2 via NC to obtain SpO2 >=88%  If LPM needed, document LPM  needed to reach =>88%    2  SPO2 during exertion on Room Air 84  %  a  During exertion monitor SPO2  If SPO2 increases >=89%, do not add supplemental oxygen    3  SPO2 on Oxygen at Rest  : N/A    4  SPO2 during exertion on Oxygen 90 % at 3 LPM    5  Test performed during exertion activity  [x]  Supplemental Home Oxygen is indicated  []  Client does not qualify for home oxygen  Respiratory Additional Notes- Patient ambulated for 300 ft  Patient became tachycardia (up to 160) during ambulation  RN is made aware of heart rate       Priyank Mullen [address], RT

## 2022-06-17 NOTE — PLAN OF CARE
Problem: INFECTION - ADULT  Goal: Absence or prevention of progression during hospitalization  Description: INTERVENTIONS:  - Assess and monitor for signs and symptoms of infection  - Monitor lab/diagnostic results  - Monitor all insertion sites, i e  indwelling lines, tubes, and drains  - Monitor endotracheal if appropriate and nasal secretions for changes in amount and color  - [place] appropriate cooling/warming therapies per order  - Administer medications as ordered  - Instruct and encourage patient and family to use good hand hygiene technique  - Identify and instruct in appropriate isolation precautions for identified infection/condition  Outcome: Progressing  Goal: Absence of fever/infection during neutropenic period  Description: INTERVENTIONS:  - Monitor WBC    Outcome: Progressing     Problem: RESPIRATORY - ADULT  Goal: Achieves optimal ventilation and oxygenation  Description: INTERVENTIONS:  - Assess for changes in respiratory status  - Assess for changes in mentation and behavior  - Position to facilitate oxygenation and minimize respiratory effort  - Oxygen administered by appropriate delivery if ordered  - Initiate smoking cessation education as indicated  - Encourage broncho-pulmonary hygiene including cough, deep breathe, Incentive Spirometry  - Assess the need for suctioning and aspirate as needed  - Assess and instruct to report SOB or any respiratory difficulty  - Respiratory Therapy support as indicated  Outcome: Progressing     Problem: DISCHARGE PLANNING  Goal: Discharge to home or other facility with appropriate resources  Description: INTERVENTIONS:  - Identify barriers to discharge w/patient and caregiver  - Arrange for needed discharge resources and transportation as appropriate  - Identify discharge learning needs (meds, wound care, etc )  - Arrange for interpretive services to assist at discharge as needed  - Refer to Case Management Department for coordinating discharge planning if the patient needs post-hospital services based on physician/advanced practitioner order or complex needs related to functional status, cognitive ability, or social support system  Outcome: Progressing     Problem: Knowledge Deficit  Goal: Patient/family/caregiver demonstrates understanding of disease process, treatment plan, medications, and discharge instructions  Description: Complete learning assessment and assess knowledge base    Interventions:  - Provide teaching at level of understanding  - Provide teaching via preferred learning methods  Outcome: Progressing

## 2022-06-17 NOTE — PROGRESS NOTES
Progress note - Gastroenterology   [facility] - KMI 78 y o  male MRN: [id number]  Unit/Bed#: -01 Encounter: [id number]    ASSESSMENT and PLAN    1  Elevated liver transaminases  60-year-old male admitted with fever and found to have pneumonia  GI consulted for elevated LFTs which were previously normal     No history of liver disease  Admits to drinking 1 bottle whiskey each weekend, no daily drinking  Abdominal ultrasound shows normal liver, no cirrhosis  Hepatitis panel negative  LFTs trending down  Suspect LFTs elevated due to acute infection/pneumonia  -continue to trend LFTs  If he is stable for discharge, which check LFTs in 2-3 weeks after discharge  -iron panel and other serology pending  -outpatient follow-up    2   Positive Cologuard  Patient was scheduled for outpatient colonoscopy but developed fever and cough  -plan for outpatient colonoscopy in 4-6 weeks after pneumonia resolved  Chief Complaint   Patient presents with    Fever - 9 weeks to 74 years     X1 week; was at pcp today and told to come to ed for eval as oxygen sat was low; possible pneumonia        SUBJECTIVE/HPI   Occasional cough, denies shortness of breath  Appetite good, no nausea or vomiting  No BM times 24 hours, denies rectal bleeding  Hemoglobin 11 5 on admission, stable at 10 3, normal MCV  , , normal alkaline phosphatase and total bilirubin    /68 (BP Location: Left arm)   Pulse 97   Temp 98 °F (36 7 °C) (Oral)   Resp 18   Ht 6' (1 829 m)   Wt 75 3 kg (166 lb)   SpO2 92%   BMI 22 51 kg/m²     PHYSICALEXAM  General appearance:  Appears comfortable  Eyes:  no icterus   Head: Normocephalic, without obvious abnormality, atraumatic  Lungs: clear to auscultation bilaterally  Heart: regular rate and rhythm, S1, S2 normal, no murmur, click, rub or gallop  Abdomen: soft, non-tender; bowel sounds normal; no masses,  no organomegaly  Extremities: extremities normal, atraumatic, no cyanosis or edema  Neurologic: Grossly normal    Lab Results   Component Value Date    GLUCOSE 103 05/14/2014    CALCIUM 8 8 06/17/2022     05/14/2014    K 3 6 06/17/2022    CO2 23 06/17/2022    CL 99 (L) 06/17/2022    BUN 10 06/17/2022    CREATININE 1 02 06/17/2022     Lab Results   Component Value Date    WBC 8 35 06/17/2022    HGB 10 3 (L) 06/17/2022    HCT 29 6 (L) 06/17/2022    MCV 83 06/17/2022     (H) 06/17/2022     Lab Results   Component Value Date     (H) 06/17/2022     (H) 06/17/2022    ALKPHOS 80 06/17/2022    BILITOT 0 4 05/14/2014       Lab Results   Component Value Date    INR 1 15 06/14/2022

## 2022-06-17 NOTE — PLAN OF CARE
Problem: PAIN - ADULT  Goal: Verbalizes/displays adequate comfort level or baseline comfort level  Description: Interventions:  - Encourage patient to monitor pain and request assistance  - Assess pain using appropriate pain scale  - Administer analgesics based on type and severity of pain and evaluate response  - Implement non-pharmacological measures as appropriate and evaluate response  - Consider cultural and social influences on pain and pain management  - Notify physician/advanced practitioner if interventions unsuccessful or patient reports new pain  Outcome: Progressing     Problem: INFECTION - ADULT  Goal: Absence or prevention of progression during hospitalization  Description: INTERVENTIONS:  - Assess and monitor for signs and symptoms of infection  - Monitor lab/diagnostic results  - Monitor all insertion sites, i e  indwelling lines, tubes, and drains  - Monitor endotracheal if appropriate and nasal secretions for changes in amount and color  - [place] appropriate cooling/warming therapies per order  - Administer medications as ordered  - Instruct and encourage patient and family to use good hand hygiene technique  - Identify and instruct in appropriate isolation precautions for identified infection/condition  Outcome: Progressing  Goal: Absence of fever/infection during neutropenic period  Description: INTERVENTIONS:  - Monitor WBC    Outcome: Progressing     Problem: SAFETY ADULT  Goal: Patient will remain free of falls  Description: INTERVENTIONS:  - Educate patient/family on patient safety including physical limitations  - Instruct patient to call for assistance with activity   - Consult OT/PT to assist with strengthening/mobility   - Keep Call bell within reach  - Keep bed low and locked with side rails adjusted as appropriate  - Keep care items and personal belongings within reach  - Initiate and maintain comfort rounds  - Make Fall Risk Sign visible to staff  - Offer Toileting every 2 Hours, in advance of need  - Initiate/Maintain alarm  - Obtain necessary fall risk management equipment  - Apply yellow socks and bracelet for high fall risk patients  - Consider moving patient to room near nurses station  Outcome: Progressing  Goal: Maintain or return to baseline ADL function  Description: INTERVENTIONS:  -  Assess patient's ability to carry out ADLs; assess patient's baseline for ADL function and identify physical deficits which impact ability to perform ADLs (bathing, care of mouth/teeth, toileting, grooming, dressing, etc )  - Assess/evaluate cause of self-care deficits   - Assess range of motion  - Assess patient's mobility; develop plan if impaired  - Assess patient's need for assistive devices and provide as appropriate  - Encourage maximum independence but intervene and supervise when necessary  - Involve family in performance of ADLs  - Assess for home care needs following discharge   - Consider OT consult to assist with ADL evaluation and planning for discharge  - Provide patient education as appropriate  Outcome: Progressing  Goal: Maintains/Returns to pre admission functional level  Description: INTERVENTIONS:  - Perform BMAT or MOVE assessment daily    - Set and communicate daily mobility goal to care team and patient/family/caregiver  - Collaborate with rehabilitation services on mobility goals if consulted  - Perform Range of Motion 4 times a day  - Reposition patient every 2 hours    - Dangle patient 4 times a day  - Stand patient 4 times a day  - Ambulate patient 4 times a day  - Out of bed to chair 4 times a day   - Out of bed for meals 3 times a day  - Out of bed for toileting  - Record patient progress and toleration of activity level   Outcome: Progressing     Problem: DISCHARGE PLANNING  Goal: Discharge to home or other facility with appropriate resources  Description: INTERVENTIONS:  - Identify barriers to discharge w/patient and caregiver  - Arrange for needed discharge resources and transportation as appropriate  - Identify discharge learning needs (meds, wound care, etc )  - Arrange for interpretive services to assist at discharge as needed  - Refer to Case Management Department for coordinating discharge planning if the patient needs post-hospital services based on physician/advanced practitioner order or complex needs related to functional status, cognitive ability, or social support system  Outcome: Progressing     Problem: Knowledge Deficit  Goal: Patient/family/caregiver demonstrates understanding of disease process, treatment plan, medications, and discharge instructions  Description: Complete learning assessment and assess knowledge base  Interventions:  - Provide teaching at level of understanding  - Provide teaching via preferred learning methods  Outcome: Progressing     Problem: RESPIRATORY - ADULT  Goal: Achieves optimal ventilation and oxygenation  Description: INTERVENTIONS:  - Assess for changes in respiratory status  - Assess for changes in mentation and behavior  - Position to facilitate oxygenation and minimize respiratory effort  - Oxygen administered by appropriate delivery if ordered  - Initiate smoking cessation education as indicated  - Encourage broncho-pulmonary hygiene including cough, deep breathe, Incentive Spirometry  - Assess the need for suctioning and aspirate as needed  - Assess and instruct to report SOB or any respiratory difficulty  - Respiratory Therapy support as indicated  Outcome: Progressing     Problem: Nutrition/Hydration-ADULT  Goal: Nutrient/Hydration intake appropriate for improving, restoring or maintaining nutritional needs  Description: Monitor and assess patient's nutrition/hydration status for malnutrition  Collaborate with interdisciplinary team and initiate plan and interventions as ordered  Monitor patient's weight and dietary intake as ordered or per policy  Utilize nutrition screening tool and intervene as necessary   Determine patient's food preferences and provide high-protein, high-caloric foods as appropriate       INTERVENTIONS:  - Monitor oral intake, urinary output, labs, and treatment plans  - Assess nutrition and hydration status and recommend course of action  - Evaluate amount of meals eaten  - Assist patient with eating if necessary   - Allow adequate time for meals  - Recommend/ encourage appropriate diets, oral nutritional supplements, and vitamin/mineral supplements  - Order, calculate, and assess calorie counts as needed  - Recommend, monitor, and adjust tube feedings and TPN/PPN based on assessed needs  - Assess need for intravenous fluids  - Provide specific nutrition/hydration education as appropriate  - Include patient/family/caregiver in decisions related to nutrition  Outcome: Progressing

## 2022-06-17 NOTE — ASSESSMENT & PLAN NOTE
Appreciate nutrition recommendations to add Ensure BID   Malnutrition Findings:   Adult Malnutrition type: Chronic illness  Adult Degree of Malnutrition: Other severe protein calorie malnutrition  Malnutrition Characteristics: Fat loss, Muscle loss, Inadequate energy, Weight loss  360 Statement: Pt pressents with severe protein calorie malnutrition as evidenced by 18 lb wt loss in 10 weeks ( 4/8/22 185 lbs, 6/16/22 168 lbs), <75% po intake > 1 month, sunken orbitals and temporal depressions    BMI Findings: BMI 22 51 kg/m2

## 2022-06-17 NOTE — CASE MANAGEMENT
Case Management Progress Note    Patient name Rae Nixon  Location [address] 221/-[id number] MRN [id number]  : 1955 Date 2022       LOS (days): 3  Geometric Mean LOS (GMLOS) (days): 4 80  Days to GMLOS:1 8        OBJECTIVE:        Current admission status: Inpatient  Preferred Pharmacy:   [address]  [facility]  [address]  [facility] [address]  Phone: [phone] Fax: [phone]    Primary Care Provider: Lisa Aggarwal MD    Primary Insurance: MEDICARE  Secondary Insurance:     PROGRESS NOTE:    Home 02 approved and delivered to pt's room  Delivery ticket signed

## 2022-06-17 NOTE — CASE MANAGEMENT
Case Management Discharge Planning Note    Patient name Severiano Rayas  Location [address] 221/-[id number] MRN [id number]  : 1955 Date 2022       Current Admission Date: 2022  Current Admission Diagnosis:Right lower lobe pneumonia   Patient Active Problem List    Diagnosis Date Noted    Hypoxemia 2022    Severe protein-calorie malnutrition ([facility] [address] ) 2022    Unintentional weight loss 06/15/2022    Right lower lobe pneumonia 2022    Hyponatremia 2022    Transaminitis 2022    Smoking 2021    Arthritis 2021    Prostate cancer ([facility] [address] ) 2021    Elevated PSA 2021    Essential hypertension 2019    Cigarette nicotine dependence without complication       LOS (days): 3  Geometric Mean LOS (GMLOS) (days): 4 80  Days to GMLOS:1 9     OBJECTIVE:  Risk of Unplanned Readmission Score: 8 37         Current admission status: Inpatient   Preferred Pharmacy:   [facility] [id number] [place] [id number]  Phone: [phone] Fax: [phone]    Primary Care Provider: Madeleine Ulloa MD    Primary Insurance: MEDICARE  Secondary Insurance:     DISCHARGE DETAILS:      DME Referral Provided  Referral made for DME?: Yes  DME referral completed for the following items[de-identified] Home Oxygen concentrator, Portable Oxygen tanks  DME Supplier Name[de-identified] [employer]    Other Referral/Resources/Interventions Provided:  Interventions: DME  Referral Comments: Referral to [facility] for home 02      Treatment Team Recommendation: Home  Discharge Destination Plan[de-identified] Home  Transport at Discharge : Family     Additional Comments: CM was informed that pt will need home 02  Pt and wife are aware and agreeable  Referral sent to [facility] via [place] per preference   Awaiting approval

## 2022-06-17 NOTE — DISCHARGE INSTR - AVS FIRST PAGE
Follow up with PCP within 1 week for post hospitalization follow up  A referral has been placed to follow up with pulmonology as an outpatient  Continue antibiotic (cefdinir) for additional 4 days to complete antibiotic course  Continue albuterol inhaler as needed for shortness of breath/wheezing    Return to the emergency department for further evaluation with any chest pain/palpitations, worsening shortness of breath, nausea/vomiting, abdominal pain, recurrent fever/chills

## 2022-06-17 NOTE — DISCHARGE SUMMARY
[place]  Discharge- Susanna 1955, 78 y o  male MRN: [id number]  Unit/Bed#: -01 Encounter: [id number]  Primary Care Provider: Greg Rudd MD   Date and time admitted to hospital: 6/14/2022  3:23 PM    * Right lower lobe pneumonia  Assessment & Plan  · 6/13 Presented with fevers/chills, cough, shortness of breath, general malaise x 1 week, T-max 103° at home  · WBC 8 35  · LA 1 5  · Blood cultures no growth at 24hrs  · CXR-Nonspecific infiltrate in the right lower lung  Presumed infectious process, more likely bacterial pneumonia  · Procalcitonin 0 98  · COVID/FLU/RSV negative  · Urinary antigens negative  · Sputum GS/culture showed no bacteria   · 6/14 started IV ceftriaxone  · 6/16 placed on NC 1L for SPO2 85-88%, stable at 93%  · 6/17 ambulatory pulse ox completed prior to discharge  Patient requires 3 L nasal cannula on discharge with exertion  · Discharge on oral cefdinir to complete antibiotic course    Severe protein-calorie malnutrition ([address] )  Assessment & Plan  Appreciate nutrition recommendations to add Ensure BID   Malnutrition Findings:   Adult Malnutrition type: Chronic illness  Adult Degree of Malnutrition: Other severe protein calorie malnutrition  Malnutrition Characteristics: Fat loss, Muscle loss, Inadequate energy, Weight loss  360 Statement: Pt pressents with severe protein calorie malnutrition as evidenced by 18 lb wt loss in 10 weeks ( 4/8/22 185 lbs, 6/16/22 168 lbs), <75% po intake > 1 month, sunken orbitals and temporal depressions  BMI Findings: BMI 22 51 kg/m2      Hypoxemia  Assessment & Plan  · Patient started on 1 L nasal cannula due to hypoxia SpO2 85-88%  · In setting of possible underlying chronic obstructive pulmonary disease/pneumonia  · Ambulatory referral to pulmonology placed due to concern for possibly undiagnosed chronic obstructive pulmonary disease due to tobacco use    Will also need outpatient PFTs  · Continue O2 supplementation to maintain SpO2 > 90%  · Encourage incentive spirometry  · Continue treatment for pneumonia as noted above  · Ambulatory pulse ox completed prior to discharge - patient required up to 3 L nasal cannula with exertion    Unintentional weight loss  Assessment & Plan  · Pts wife reports unintentional weight loss of 13lbs in 3 weeks   · Hx of prostate cancer, received radiation 10/21, follows with radiation/oncology regularly   · + cologuard 4/2022, was scheduled for colonoscopy 6/8/22 but missed appointment 2/2 current pneumonia  · On admission, pt had elevated LFTs  · Pt drinks 1/5-1/2 of alcohol per week   · RUQ u/s normal   · GI recommendations appreciated   · F/u outpatient for colonoscopy   · Appreciate nutrition recommendations to add Ensure BID    Transaminitis  Assessment & Plan  · ,  on admission   · LFTs downtrending   · Pt states he drinks 1/5-1/2 of alcohol per week   · Hepatitis panel negative   · RUQ U/S normal   · GI recommendations appreciated  · Alpha-1 Antitrypsin and iron panel pending     Hyponatremia  Assessment & Plan  · Sodium 132 on admission, likely in setting of poor p o  intake as patient has not had appetite  · Also with chronic alcohol use may be contributing  · received 1L IVF in ED  · 6/17 sodium stable 132  · Appreciate nutrition recommendations to add Ensure to diet BID  · Outpatient BMP in 1 week    Smoking  Assessment & Plan  · Heavy tobacco use (40-pack-year)   · Pt states chronic SOB and wheezing with exertion   · Possible underlying COPD  · Will have pt follow up with Pulmonology outpatient for PFTs  · Started pt on Duo-Nebs prn     Prostate cancer [facility])  Assessment & Plan  · Patient completed radiation treatment 10/2021  · Follows up outpatient, with recent satisfactory PSA  · Continue routine outpatient follow-up    Essential hypertension  Assessment & Plan  · SBP 100s this morning  · Will hold amlodipine for now  · Monitor blood pressure    Sepsis (HCC)-resolved as of 6/17/2022  Assessment & Plan  · POA secondary to pneumonia  · Evidenced by fever, tachycardia, tachypnea  · Further management under pneumonia    Medical Problems             Resolved Problems  Date Reviewed: 6/17/2022          Resolved    Sepsis ([address] ) 6/17/2022     Resolved by  Jay House PA-C              Discharging Physician / Practitioner: Jay House PA-C  PCP: Lotus Velasquez MD  Admission Date:   Admission Orders (From admission, onward)     Ordered        06/14/22 1648  INPATIENT ADMISSION  Once                      Discharge Date: 06/17/22    Consultations During Hospital Stay:  · Gastroenterology: f/u outpatient for colonoscopy and liver evaluation   · Nutrition: add Ensure to diet BID     Procedures Performed:   · None    Significant Findings / Test Results:   · CXR: nonspecific infiltrate of RLL, presumed infectious etiology  · US RUQ: normal     Incidental Findings:   · See above     Test Results Pending at Discharge (will require follow up): · Alpha-1-antitrypsin   · Iron Panel      Outpatient Tests Requested:  · Colonoscopy  · Pulmonary Function Testing   · BMP in 1 week    Complications:  None    Reason for Admission: right lower lobe pneumonia     Hospital Course:   Obed Cooney is a 78 y o  male patient who originally presented to the hospital on 6/14/2022 due to RLL pneumonia  In the ED, there was concern for sepsis due to tachycardia, Tmax 103F, leukocytosis in setting of active infection  Blood cultures showed no growth at 24hrs  Pt was placed on IV ceftriaxone empirically  Sputum cultures and urine strep/leigonella Ag were negative  Pts WBC consistently trended down, 8 25K on discharge  Pts cough and SOB improved during hospital course  Pt has had chronic dyspnea on exertion, possibly due to underlying chronic lung disease in setting of smoking  Recommend outpatient follow-up with Pulmonology for PFT and further evaluation      On HD2 pt was placed on 1L O2 via NC for SPO2 85-88%  SPO2 remained stable at 93%  Ambulatory SPO2 revealed patient required 3 L nasal cannula with exertion  Patient will be discharged home on oral cefdinir to complete antibiotic course  On admission, pts LFTs were elevated and scleral icterus was noted on exam  Pt drinks 1/5-1/2 alcohol per week and had a positive Cologuard in April 2022  RUQ ultrasound and hepatitis panel revealed normal findings  Gastroenterology recommended iron panel and Alpha-1-Antitrypsin screen which are still pending  LFTs are downtrending, elevation on admission suspected to be secondary to infection  Recommend outpatient follow-up for colonoscopy and further liver evaluation  During hospital say, pt complained of chronic arthritic pain b/l wrists and knees  Symptoms improved with tramadol prn and Voltaren gel  Please see above list of diagnoses and related plan for additional information  Condition at Discharge: stable    Discharge Day Visit / Exam:   Subjective:  Pt states he is feeling much better this morning, his cough and shortness of breath have significantly improved  He was afebrile over night  Pt states he does get some DNE walking to and from the bathroom, but this may be due to underlying chronic lung disease  Pt denies chest pain, abdominal pain, n/v    Vitals: Blood Pressure: 122/68 (06/17/22 0700)  Pulse: 97 (06/17/22 0700)  Temperature: 98 °F (36 7 °C) (06/17/22 0700)  Temp Source: Oral (06/17/22 0700)  Respirations: 18 (06/17/22 0700)  Height: 6' (182 9 cm) (06/14/22 1432)  Weight - Scale: 75 3 kg (166 lb) (06/17/22 0541)  SpO2: 92 % (06/17/22 0700)  Exam:   Physical Exam  Vitals and nursing note reviewed  Constitutional:       General: He is not in acute distress  Appearance: Normal appearance  He is well-developed  He is not ill-appearing  HENT:      Head: Normocephalic and atraumatic     Eyes:      Conjunctiva/sclera: Conjunctivae normal    Cardiovascular:      Rate and Rhythm: Normal rate and regular rhythm  Heart sounds: Normal heart sounds  No murmur heard  Pulmonary:      Effort: Pulmonary effort is normal  No respiratory distress  Breath sounds: Rhonchi present  Abdominal:      General: Abdomen is flat  There is no distension  Palpations: Abdomen is soft  Tenderness: There is no abdominal tenderness  There is no guarding  Musculoskeletal:         General: No swelling  Cervical back: Neck supple  Skin:     General: Skin is warm and dry  Neurological:      General: No focal deficit present  Mental Status: He is alert and oriented to person, place, and time  Psychiatric:         Mood and Affect: Mood normal          Behavior: Behavior normal          Thought Content: Thought content normal      Discussion with Family: Updated  (wife) at bedside  Discharge instructions/Information to patient and family:   See after visit summary for information provided to patient and family  Provisions for Follow-Up Care:  See after visit summary for information related to follow-up care and any pertinent home health orders  Disposition:   Home    Planned Readmission: none     Discharge Statement:  I spent 60 minutes discharging the patient  This time was spent on the day of discharge  I had direct contact with the patient on the day of discharge  Greater than 50% of the total time was spent examining patient, answering all patient questions, arranging and discussing plan of care with patient as well as directly providing post-discharge instructions  Additional time then spent on discharge activities  Discharge Medications:  See after visit summary for reconciled discharge medications provided to patient and/or family        **Please Note: This note may have been constructed using a voice recognition system**

## 2022-06-18 LAB — A1AT SERPL-MCNC: 392 MG/DL (ref 101–187)

## 2022-06-20 ENCOUNTER — TRANSITIONAL CARE MANAGEMENT (OUTPATIENT)
Dept: FAMILY MEDICINE CLINIC | Facility: CLINIC | Age: 67
End: 2022-06-20

## 2022-06-20 LAB
BACTERIA BLD CULT: NORMAL
BACTERIA BLD CULT: NORMAL

## 2022-06-24 ENCOUNTER — APPOINTMENT (OUTPATIENT)
Dept: LAB | Facility: HOSPITAL | Age: 67
End: 2022-06-24
Payer: MEDICARE

## 2022-06-24 DIAGNOSIS — E87.1 HYPONATREMIA: ICD-10-CM

## 2022-06-24 LAB
ANION GAP SERPL CALCULATED.3IONS-SCNC: 7 MMOL/L (ref 4–13)
BUN SERPL-MCNC: 12 MG/DL (ref 5–25)
CALCIUM SERPL-MCNC: 9.8 MG/DL (ref 8.3–10.1)
CHLORIDE SERPL-SCNC: 104 MMOL/L (ref 100–108)
CO2 SERPL-SCNC: 24 MMOL/L (ref 21–32)
CREAT SERPL-MCNC: 1.02 MG/DL (ref 0.6–1.3)
GFR SERPL CREATININE-BSD FRML MDRD: 75 ML/MIN/1.73SQ M
GLUCOSE P FAST SERPL-MCNC: 100 MG/DL (ref 65–99)
POTASSIUM SERPL-SCNC: 4.4 MMOL/L (ref 3.5–5.3)
SODIUM SERPL-SCNC: 135 MMOL/L (ref 136–145)

## 2022-06-24 PROCEDURE — 36415 COLL VENOUS BLD VENIPUNCTURE: CPT

## 2022-06-24 PROCEDURE — 80048 BASIC METABOLIC PNL TOTAL CA: CPT

## 2022-06-27 ENCOUNTER — OFFICE VISIT (OUTPATIENT)
Dept: FAMILY MEDICINE CLINIC | Facility: CLINIC | Age: 67
End: 2022-06-27
Payer: MEDICARE

## 2022-06-27 VITALS
BODY MASS INDEX: 23.7 KG/M2 | TEMPERATURE: 99.1 F | DIASTOLIC BLOOD PRESSURE: 76 MMHG | WEIGHT: 175 LBS | OXYGEN SATURATION: 93 % | HEART RATE: 83 BPM | SYSTOLIC BLOOD PRESSURE: 122 MMHG | HEIGHT: 72 IN

## 2022-06-27 DIAGNOSIS — I10 ESSENTIAL HYPERTENSION: ICD-10-CM

## 2022-06-27 DIAGNOSIS — J18.9 PNEUMONIA OF RIGHT LOWER LOBE DUE TO INFECTIOUS ORGANISM: Primary | ICD-10-CM

## 2022-06-27 DIAGNOSIS — F17.210 CIGARETTE NICOTINE DEPENDENCE WITHOUT COMPLICATION: ICD-10-CM

## 2022-06-27 DIAGNOSIS — C61 PROSTATE CANCER (HCC): ICD-10-CM

## 2022-06-27 PROBLEM — E43 SEVERE PROTEIN-CALORIE MALNUTRITION (HCC): Status: RESOLVED | Noted: 2022-06-16 | Resolved: 2022-06-27

## 2022-06-27 PROBLEM — M17.0 PRIMARY OSTEOARTHRITIS OF BOTH KNEES: Status: ACTIVE | Noted: 2021-07-29

## 2022-06-27 PROCEDURE — 99495 TRANSJ CARE MGMT MOD F2F 14D: CPT | Performed by: FAMILY MEDICINE

## 2022-06-27 NOTE — PROGRESS NOTES
[address]        NAME: Margie Roy is a 78 y o  male  : 1955    MRN: [id number]  DATE: 2022  TIME: 5:25 PM    Assessment and Plan   Pneumonia of right lower lobe due to infectious organism [[address] 9]  1  Pneumonia of right lower lobe due to infectious organism  XR chest pa & lateral    Ambulatory Referral to Pulmonology   2  Prostate cancer ([address] )     3  Essential hypertension     4  Cigarette nicotine dependence without complication  Ambulatory Referral to Pulmonology       No problem-specific Assessment & Plan notes found for this encounter  Patient Instructions     Patient Instructions   Due possible chronic lung disease I would keep pulmonary appointment  They can do pulmonary function studies to evaluate current lung status  It would continue to be benefit to have you not smoking  I would check a chest x-ray in about 2-3 weeks  Consider CT scan to assess lungs at a later point  Monitor the blood pressure  If her blood pressure starts to go up above 135/85 consistently we may need to restart blood pressure medications  I will invest in a pulse oximeter to evaluate her oxygen levels as you come off the oxygen  Pulse oxygen saturation prior to illness was 97%  Today you are at 93% on room air  Consider repeat orthopedic consultation on your knees            Chief Complaint     Chief Complaint   Patient presents with    Transition of Care Management         History of Present Illness       TCM Call (since 2022)     Date and time call was made  2022  2:14 PM    Hospital care reviewed  Records reviewed    Patient was hospitialized at  [address] [place] [address]    Date of Admission  22    Date of discharge  22    Diagnosis  Right lower lobe pneumonia    Disposition  Home    Were the patients medications reviewed and updated  Yes    Current Symptoms  None      TCM Call (since 2022)     Post hospital issues  None    Should patient be enrolled in anticoag monitoring? No    Scheduled for follow up? Yes  06/27/2022    Did you obtain your prescribed medications  Yes    Do you need help managing your prescriptions or medications  No    Is transportation to your appointment needed  No    I have advised the patient to call PCP with any new or worsening symptoms    MARKO BELTRAN MA    Living Arrangements  Family members      Patient here for TCM  Hospital records reviewed  Recent labs reviewed  Medications are reviewed reconciled  Patient generally feeling well  Does have home O2      Review of Systems   Review of Systems   Constitutional: Negative for appetite change, chills, diaphoresis and fever  HENT: Negative for ear pain, rhinorrhea, sinus pressure and sore throat  Eyes: Negative for discharge, redness and itching  Respiratory: Negative for cough, shortness of breath and wheezing  Cardiovascular: Negative for chest pain and palpitations  Rapid or slow heart rate   Gastrointestinal: Negative for abdominal pain, diarrhea, nausea and vomiting  Musculoskeletal: Positive for arthralgias  Negative for back pain and myalgias           Current Medications       Current Outpatient Medications:     ibuprofen (ADVIL,MOTRIN) 100 MG tablet, Take 100 mg by mouth every 6 (six) hours as needed for mild pain, Disp: , Rfl:     sildenafil (VIAGRA) 100 mg tablet, Take 1 tablet (100 mg total) by mouth daily as needed for erectile dysfunction, Disp: 6 tablet, Rfl: 6    traMADol (ULTRAM) 50 mg tablet, Take 1 tablet (50 mg total) by mouth every 6 (six) hours as needed for moderate pain for up to 10 days, Disp: 10 tablet, Rfl: 0    albuterol (ProAir HFA) 90 mcg/act inhaler, Inhale 2 puffs every 6 (six) hours as needed for wheezing (Patient not taking: Reported on 6/27/2022), Disp: 8 5 g, Rfl: 0    Diclofenac Sodium (VOLTAREN) 1 %, Apply 2 g topically 4 (four) times a day for 7 days, Disp: 56 g, Rfl: 0    Current Allergies Allergies as of 06/27/2022    (No Known Allergies)            The following portions of the patient's history were reviewed and updated as appropriate: allergies, current medications, past family history, past medical history, past social history, past surgical history and problem list      Past Medical History:   Diagnosis Date    Arthritis     both knees    Prostate cancer [facility])        Past Surgical History:   Procedure Laterality Date    CARPAL TUNNEL RELEASE  2015    Both wrists    MO PLACE RADIOTHER DEVICE/MARKER, PROSTATE N/A 7/29/2021    Procedure: INSERTION OF FIDUCIAL MARKER (TRANSRECTAL ULTRASOUND GUIDANCE), Rand Ha;  Surgeon: Lexie Juárez MD;  [facility];  Service: Urology    TONSILLECTOMY         Family History   Problem Relation Age of Onset    Breast cancer Mother 48    Substance Abuse Neg Hx     Mental illness Neg Hx          Medications have been verified  Objective   /76   Pulse 83   Temp 99 1 °F (37 3 °C) (Tympanic)   Ht 6' (1 829 m)   Wt 79 4 kg (175 lb)   SpO2 93%   BMI 23 73 kg/m²        Physical Exam     Physical Exam  Vitals reviewed  Constitutional:       General: He is not in acute distress  Appearance: He is well-developed  HENT:      Head: Normocephalic and atraumatic  Right Ear: Tympanic membrane and external ear normal  No drainage  Left Ear: Tympanic membrane normal  No drainage  Mouth/Throat:      Pharynx: No oropharyngeal exudate  Eyes:      General:         Right eye: No discharge  Left eye: No discharge  Conjunctiva/sclera: Conjunctivae normal    Neck:      Thyroid: No thyromegaly  Cardiovascular:      Rate and Rhythm: Normal rate and regular rhythm  Heart sounds: Normal heart sounds  Pulmonary:      Effort: Pulmonary effort is normal  No respiratory distress  Breath sounds: No wheezing or rales  Musculoskeletal:      Cervical back: Normal range of motion and neck supple        Right lower leg: Bony tenderness present  No deformity  No edema  Left lower leg: Bony tenderness present  No deformity  No edema  Lymphadenopathy:      Cervical: No cervical adenopathy

## 2022-06-27 NOTE — PATIENT INSTRUCTIONS
Due possible chronic lung disease I would keep pulmonary appointment  They can do pulmonary function studies to evaluate current lung status  It would continue to be benefit to have you not smoking  I would check a chest x-ray in about 2-3 weeks  Consider CT scan to assess lungs at a later point  Monitor the blood pressure  If her blood pressure starts to go up above 135/85 consistently we may need to restart blood pressure medications  I will invest in a pulse oximeter to evaluate her oxygen levels as you come off the oxygen  Pulse oxygen saturation prior to illness was 97%  Today you are at 93% on room air  Consider repeat orthopedic consultation on your knees

## 2022-07-06 DIAGNOSIS — C61 PROSTATE CANCER (HCC): ICD-10-CM

## 2022-07-06 DIAGNOSIS — N52.9 ERECTILE DYSFUNCTION, UNSPECIFIED ERECTILE DYSFUNCTION TYPE: ICD-10-CM

## 2022-07-08 RX ORDER — SILDENAFIL 100 MG/1
100 TABLET, FILM COATED ORAL DAILY PRN
Qty: 6 TABLET | Refills: 3 | Status: SHIPPED | OUTPATIENT
Start: 2022-07-08 | End: 2022-07-25

## 2022-07-15 ENCOUNTER — CLINICAL SUPPORT (OUTPATIENT)
Dept: FAMILY MEDICINE CLINIC | Facility: CLINIC | Age: 67
End: 2022-07-15
Payer: MEDICARE

## 2022-07-15 DIAGNOSIS — Z09 NEED FOR IMMUNIZATION FOLLOW-UP: Primary | ICD-10-CM

## 2022-07-15 DIAGNOSIS — Z23 ENCOUNTER FOR IMMUNIZATION: ICD-10-CM

## 2022-07-15 PROCEDURE — G0009 ADMIN PNEUMOCOCCAL VACCINE: HCPCS

## 2022-07-15 PROCEDURE — 90677 PCV20 VACCINE IM: CPT

## 2022-07-22 ENCOUNTER — OFFICE VISIT (OUTPATIENT)
Dept: GASTROENTEROLOGY | Facility: CLINIC | Age: 67
End: 2022-07-22
Payer: MEDICARE

## 2022-07-22 VITALS
HEIGHT: 72 IN | SYSTOLIC BLOOD PRESSURE: 128 MMHG | DIASTOLIC BLOOD PRESSURE: 84 MMHG | BODY MASS INDEX: 24.11 KG/M2 | WEIGHT: 178 LBS

## 2022-07-22 DIAGNOSIS — R19.5 POSITIVE COLORECTAL CANCER SCREENING USING COLOGUARD TEST: Primary | ICD-10-CM

## 2022-07-22 PROCEDURE — 99214 OFFICE O/P EST MOD 30 MIN: CPT | Performed by: INTERNAL MEDICINE

## 2022-07-22 RX ORDER — SODIUM PICOSULFATE, MAGNESIUM OXIDE, AND ANHYDROUS CITRIC ACID 10; 3.5; 12 MG/160ML; G/160ML; G/160ML
LIQUID ORAL
Qty: 320 ML | Refills: 0 | Status: SHIPPED | COMMUNITY
Start: 2022-07-22 | End: 2022-09-09 | Stop reason: HOSPADM

## 2022-07-22 NOTE — PROGRESS NOTES
[facility] - Outpatient Follow-up Note  THE [facility] - KMI 78 y o  male MRN: [id number]  Encounter: [id number]    ASSESSMENT AND PLAN:      1  Positive colorectal cancer screening using Cologuard test  This was done in the spring  Could be a false-positive perhaps from rectal bleeding from radiation proctitis  He had radiation of prostate in the fall of 2021  Should have colonoscopy  He seems to have recovered almost completely from his pneumonia I do not see any contraindication to the procedure at this point  I think he can be done in an outpatient setting   - Colonoscopy; Future      Followup Appointment:  As needed  ______________________________________________________________________    Chief Complaint   Patient presents with    Follow-up     SLUB      HPI:  Patient is a very pleasant 70-year-old male who had a positive Cologuard test done in the spring of 2022  He was scheduled for colonoscopy but developed pneumonia and was hospitalized with hypoxia  He has made almost a complete recovery  He is having no symptoms of shortness of breath or cough ambulating without difficulty  In questioning he has noticed small amounts of rectal bleeding in the last couple weeks  Some urgency as well  He did have radiation for prostate cancer in the fall of 2021    Blood    Historical Information   Past Medical History:   Diagnosis Date    Arthritis     both knees    Hypertension 10/01/20    Dr Cody Denson Prostate cancer [facility])      Past Surgical History:   Procedure Laterality Date    CARPAL TUNNEL RELEASE  2015    Both wrists    [place] PLACE RADIOTHER DEVICE/MARKER, PROSTATE N/A 7/29/2021    Procedure: INSERTION OF FIDUCIAL MARKER (TRANSRECTAL ULTRASOUND GUIDANCE), Emily Yanez;  Surgeon: Asia Garcia MD;  [facility];  Service: Urology    TONSILLECTOMY       Social History     Substance and Sexual Activity   Alcohol Use Yes    Alcohol/week: 28 0 standard drinks    Types: 28 Shots of liquor per week    Comment: drinks fifth of alcohol during week     Social History     Substance and Sexual Activity   Drug Use Not Currently    Types: Marijuana    Comment: daily     Social History     Tobacco Use   Smoking Status Current Every Day Smoker    Packs/day: 1 00    Years: 40 00    Pack years: 40 00    Types: Cigarettes    Start date: 4/27/1980   Smokeless Tobacco Never Used     Family History   Problem Relation Age of Onset    Breast cancer Mother 48    Substance Abuse Neg Hx     Mental illness Neg Hx     Colon cancer Neg Hx     Colon polyps Neg Hx          Current Outpatient Medications:     Naproxen Sodium (ALEVE PO)    sildenafil (VIAGRA) 100 mg tablet    albuterol (ProAir HFA) 90 mcg/act inhaler    Diclofenac Sodium (VOLTAREN) 1 %    ibuprofen (ADVIL,MOTRIN) 100 MG tablet  No Known Allergies  Reviewed medications and allergies and updated as indicated    PHYSICAL EXAM:    Blood pressure 128/84, height 6' (1 829 m), weight 80 7 kg (178 lb)  Body mass index is 24 14 kg/m²  General Appearance: NAD, cooperative, alert  Eyes: Anicteric, PERRLA, EOMI  ENT:  Normocephalic, atraumatic, normal mucosa  Neck:  Supple, symmetrical, trachea midline  Resp:  Clear to auscultation bilaterally; no rales, rhonchi or wheezing; respirations unlabored   CV:  S1 S2, Regular rate and rhythm; no murmur, rub, or gallop  GI:  Soft, non-tender, non-distended; normal bowel sounds; no masses, no organomegaly   Rectal: Deferred  Musculoskeletal: No cyanosis, clubbing or edema  Normal ROM    Skin:  No jaundice, rashes, or lesions   Heme/Lymph: No palpable cervical lymphadenopathy  Psych: Normal affect, good eye contact  Neuro: No gross deficits, AAOx3    Lab Results:   Lab Results   Component Value Date    WBC 8 35 06/17/2022    HGB 10 3 (L) 06/17/2022    HCT 29 6 (L) 06/17/2022    MCV 83 06/17/2022     (H) 06/17/2022     Lab Results   Component Value Date     05/14/2014    K 4 4 06/24/2022     06/24/2022    CO2 24 06/24/2022    ANIONGAP 7 05/14/2014    BUN 12 06/24/2022    CREATININE 1 02 06/24/2022    GLUCOSE 103 05/14/2014    GLUF 100 (H) 06/24/2022    CALCIUM 9 8 06/24/2022    CORRECTEDCA 10 0 06/15/2022     (H) 06/17/2022     (H) 06/17/2022    ALKPHOS 80 06/17/2022    PROT 7 1 05/14/2014    BILITOT 0 4 05/14/2014    EGFR 75 06/24/2022     Lab Results   Component Value Date    IRON 29 (L) 06/17/2022    TIBC 145 (L) 06/17/2022    FERRITIN 1,604 (H) 06/17/2022     No results found for: LIPASE    Radiology Results:   No results found

## 2022-07-22 NOTE — TELEPHONE ENCOUNTER
Scheduled date of colonoscopy (as of today): 09/09/2022  Physician performing colonoscopy: Katelyn  Location of colonoscopy: [facility]  Bowel prep reviewed with patient: [email or web address]  Instructions reviewed with patient by: Sheyla Ribeiro  Clearances:  NONE

## 2022-07-25 ENCOUNTER — OFFICE VISIT (OUTPATIENT)
Dept: UROLOGY | Facility: HOSPITAL | Age: 67
End: 2022-07-25
Payer: MEDICARE

## 2022-07-25 VITALS
HEIGHT: 72 IN | WEIGHT: 178 LBS | SYSTOLIC BLOOD PRESSURE: 128 MMHG | HEART RATE: 96 BPM | BODY MASS INDEX: 24.11 KG/M2 | DIASTOLIC BLOOD PRESSURE: 76 MMHG | OXYGEN SATURATION: 98 %

## 2022-07-25 DIAGNOSIS — N52.9 ERECTILE DYSFUNCTION, UNSPECIFIED ERECTILE DYSFUNCTION TYPE: ICD-10-CM

## 2022-07-25 DIAGNOSIS — C61 PROSTATE CANCER (HCC): Primary | ICD-10-CM

## 2022-07-25 LAB
SL AMB  POCT GLUCOSE, UA: ABNORMAL
SL AMB LEUKOCYTE ESTERASE,UA: ABNORMAL
SL AMB POCT BILIRUBIN,UA: ABNORMAL
SL AMB POCT BLOOD,UA: ABNORMAL
SL AMB POCT CLARITY,UA: CLEAR
SL AMB POCT COLOR,UA: ABNORMAL
SL AMB POCT KETONES,UA: ABNORMAL
SL AMB POCT NITRITE,UA: ABNORMAL
SL AMB POCT PH,UA: 5
SL AMB POCT SPECIFIC GRAVITY,UA: 1.01
SL AMB POCT URINE PROTEIN: ABNORMAL
SL AMB POCT UROBILINOGEN: 0.2

## 2022-07-25 PROCEDURE — 99213 OFFICE O/P EST LOW 20 MIN: CPT | Performed by: NURSE PRACTITIONER

## 2022-07-25 PROCEDURE — 81002 URINALYSIS NONAUTO W/O SCOPE: CPT | Performed by: NURSE PRACTITIONER

## 2022-07-25 RX ORDER — SILDENAFIL 100 MG/1
100 TABLET, FILM COATED ORAL AS NEEDED
Qty: 30 TABLET | Refills: 3 | Status: SHIPPED | OUTPATIENT
Start: 2022-07-25

## 2022-07-25 NOTE — PROGRESS NOTES
07/25/22    [facility]   1955   [id number]     Assessment  1 [place] prostate cancer s/p radiation therapy (10/2021)  2 ED      Discussion/Plan  1 [place] prostate cancer s/p radiation therapy (10/2021)              6/3/22 PSA 1 0   Follow with Radiation Oncology   2 ED    100 mg Sildenafil PRN      Patient will return with next PSA in 3 months  All questions answered at this time  Subjective  HPI   Mook Pruett is a 78year old male known to Dr Cora Craven who presents in follow up  He has history of Rachel 7 prostate cancer  He had his first prostate biopsy on 4/28/2021 which was positive in 6/12 cores bilaterally for Rachel score 4+3=7 disease in 3 cores with the other 3 cores showing 3+4=7 disease  His bone scan and CT were negative for metastatic disease  He elected to pursue radiation therapy and he was referred to Radiation Oncology  He had fiducial markers and SpaceOAR placed in July 2021 by Dr Slava Guillermo  He was diagnosed when his PSA was 10 7  He is a current 1 ppd smoker x 40 years  He takes 100 mg Sildenafil as needed for erectile dysfunction  He denies lower urinary jerome symptoms, pain, or gross hematuria  He completed Radiation in October 2021  He is accompanied by his wife today  Component      Latest Ref Rng & Units 12/8/2020 3/12/2021 9/3/2021 12/1/2021           9:05 AM  8:51 AM 12:06 PM 11:38 AM   PSA, Total      0 0 - 4 0 ng/mL 8 0 (H) 10 7 (H) 9 1 (H) 2 8     Component      Latest Ref Rng & Units 4/21/2022 6/3/2022          10:01 AM  8:43 AM   PSA, Total      0 0 - 4 0 ng/mL 1 0 1 0       Final Diagnosis   A  Prostate, right lateral base:  - Prostatic adenocarcinoma, Rachel score 3 + 4 = 7, Prognostic Grade Group 2, discontinuously involving 7% of one core:  - see Note  * tumor is 10% grade 4   * periprostatic fat invasion:  not identified  * lymph-vascular invasion:  not identified     * perineural invasion:  not identified   - Additional pathologic findings: N/A     B  Prostate, right lateral mid:  - Atypical small acinar proliferation in a single focus, < 5% of one core, suspicious for low grade prostatic adenocarcinoma - see Note  C  Prostate, right lateral apex x 2:  - Prostatic adenocarcinoma, Cameron score 4 + 3 = 7, Prognostic Grade Group 3, discontinuously involving 23% of one of two cores:  - see Note  * tumor is 60% grade 4, with focal cribriform morphology   * periprostatic fat invasion:  not identified  * lymph-vascular invasion:  not identified  * perineural invasion:  not identified   - Additional pathologic findings:  N/A     D  Prostate, left lateral base:  - Prostatic adenocarcinoma, Rachel score 4 + 3 = 7, Prognostic Grade Group 3, discontinuously involving 35% of one core:  - see Note  * tumor is 70% grade 4, with focal cribriform morphology   * periprostatic fat invasion:  not identified  * lymph-vascular invasion:  not identified  * perineural invasion:  not identified   - Additional pathologic findings:  N/A     Note: Block D1 is suggested if additional studies are indicated (at least 0 5 mm of tumor for Prolaris)      E  Prostate, left lateral mid:  - Atypical small acinar proliferation in a single focus, < 5% of one core, suspicious for low grade prostatic adenocarcinoma - see Note  F  Prostate, left lateral apex:  - Benign prostate tissue      G  Prostate, left base:  - Prostatic adenocarcinoma, Rachel score 3 + 4 = 7, Prognostic Grade Group 2, continuously involving 5% of one core:   * tumor is 20% grade 4   * periprostatic fat invasion:  not identified  * lymph-vascular invasion:  not identified  * perineural invasion:  focally present  - Additional pathologic findings:  N/A     H  Prostate, left mid:  - High grade prostatic intraepithelial neoplasia - see Note        I  Prostate, left apex:  - Benign prostate tissue  J  Prostate, right base:  - Benign prostate tissue       K  Prostate, right mid:  - Prostatic adenocarcinoma, Rachel score 4 + 3 = 7, Prognostic Grade Group 3, continuously involving 19% of one core:   * tumor is 90% grade 4 and displays focal cribriform morphology   * periprostatic fat invasion:  not identified  * lymph-vascular invasion:  not identified  * perineural invasion:  not identified   - Additional pathologic findings:  N/A     L  Prostate, right apex:  - Prostatic adenocarcinoma, Rachel score 3 + 4 = 7, Prognostic Grade Group 2, discontinuously involving 19% of one core - see Note  * tumor is 40% grade 4  * periprostatic fat invasion:  not identified  * lymph-vascular invasion:  not identified  * perineural invasion:  focally present  - Additional pathologic findings:  N/A       Review of Systems - History obtained from chart review and the patient  General ROS: negative  Psychological ROS: negative  Endocrine ROS: negative  Respiratory ROS: no cough, shortness of breath, or wheezing  Cardiovascular ROS: no chest pain or dyspnea on exertion  Gastrointestinal ROS: no abdominal pain, change in bowel habits, or black or bloody stools  Genito-Urinary ROS: positive for - erectile dysfunction   Musculoskeletal ROS: negative  Neurological ROS: no TIA or stroke symptoms  Dermatological ROS: negative       Objective  Physical Exam  Vitals and nursing note reviewed  Constitutional:       General: He is awake  He is not in acute distress  Appearance: Normal appearance  He is well-developed, well-groomed and normal weight  He is not ill-appearing, toxic-appearing or diaphoretic  Pulmonary:      Effort: Pulmonary effort is normal    Abdominal:      Tenderness: There is no right CVA tenderness or left CVA tenderness  Musculoskeletal:         General: Normal range of motion  Cervical back: Normal range of motion and neck supple  Skin:     General: Skin is warm  Neurological:      General: No focal deficit present        Mental Status: He is alert and oriented to person, place, and time  Mental status is at baseline  Psychiatric:         Attention and Perception: Attention normal          Mood and Affect: Mood normal          Speech: Speech normal          Behavior: Behavior normal  Behavior is cooperative  Thought Content: Thought content normal          Cognition and Memory: Cognition normal          Judgment: Judgment normal              SEGUN La     I have spent 15 minutes with Patient and family today in which greater than 50% of this time was spent in counseling/coordination of care regarding Diagnostic results, Prognosis, Risks and benefits of tx options, Intructions for management and Patient and family education

## 2022-07-28 ENCOUNTER — TELEPHONE (OUTPATIENT)
Dept: FAMILY MEDICINE CLINIC | Facility: CLINIC | Age: 67
End: 2022-07-28

## 2022-07-28 NOTE — LETTER
2022    RE: Onelia Alexandrea          1955      To Whom It May Concern: We are sending this letter to request that you remove the oxygen tank from the above captioned patients home  Mr Merari Hansen was using the oxygen for pneumonia with respiratory failure which has resolved  The patient is asymptomatic at present and no longer requires the oxygen  If you have any further questions regarding the requested removal, please feel free to reach out to the office at   Respectfully,      Sherrod Cushing Levis Ammons, MD    PMM/lak

## 2022-07-28 NOTE — TELEPHONE ENCOUNTER
Patient's wife called into the office because the patient no longer needs his oxygen tank and she was told by the medical equipment facility that his PCP needs to submit via fax a discontinuation for the oxygen tank  Patient's wife did state that there's no specific form that needs to be filled out, the letter just needs to state that his PCP approves the discontinuation removal of the tank  She said once they receive that letter they will send out a tech to come pick it up  Fax number is [phone]  Their customer service number is [phone]

## 2022-07-28 NOTE — TELEPHONE ENCOUNTER
MD Lynnette Sarkar MA  Caller: Unspecified (Today, 10:55 AM)  Can you compose a letter  Misa Zepeda was pneumonia with respiratory failure which has resolved   Patient asymptomatic at present   Please have oxygen removed   Thanks     Letter written as below and can be found in the "Letters" tab      To Whom It May Concern: We are sending this letter to request that you remove the oxygen tank from the above captioned patients home  Mr Anabelle Holly was using the oxygen for pneumonia with respiratory failure which has resolved  The patient is asymptomatic at present and no longer requires the oxygen  If you have any further questions regarding the requested removal, please feel free to reach out to the office at   Respectfully,      Luz Grijalva MD    PMM/lak

## 2022-09-02 ENCOUNTER — CONSULT (OUTPATIENT)
Dept: PULMONOLOGY | Facility: HOSPITAL | Age: 67
End: 2022-09-02
Payer: MEDICARE

## 2022-09-02 VITALS
SYSTOLIC BLOOD PRESSURE: 130 MMHG | DIASTOLIC BLOOD PRESSURE: 80 MMHG | WEIGHT: 176 LBS | HEIGHT: 72 IN | TEMPERATURE: 97.7 F | OXYGEN SATURATION: 98 % | BODY MASS INDEX: 23.84 KG/M2 | HEART RATE: 70 BPM

## 2022-09-02 DIAGNOSIS — J18.9 PNEUMONIA OF RIGHT LOWER LOBE DUE TO INFECTIOUS ORGANISM: ICD-10-CM

## 2022-09-02 DIAGNOSIS — F17.210 CIGARETTE NICOTINE DEPENDENCE WITHOUT COMPLICATION: ICD-10-CM

## 2022-09-02 DIAGNOSIS — R94.2 ABNORMAL RESULTS OF PULMONARY FUNCTION STUDIES: ICD-10-CM

## 2022-09-02 PROCEDURE — 99203 OFFICE O/P NEW LOW 30 MIN: CPT | Performed by: INTERNAL MEDICINE

## 2022-09-02 PROCEDURE — 94010 BREATHING CAPACITY TEST: CPT | Performed by: INTERNAL MEDICINE

## 2022-09-02 PROCEDURE — 99205 OFFICE O/P NEW HI 60 MIN: CPT | Performed by: INTERNAL MEDICINE

## 2022-09-02 NOTE — PROGRESS NOTES
Assessment/Plan:    Right lower lobe pneumonia  I reviewed clearance x-ray from his hospitalization which appears to be bacterial pneumonia however given his continued smoking status I would like to follow-up with a CT scan of the lungs  Will schedule a CT scan for lung cancer screening and colon with the results  We talked about tobacco cessation the importance of quitting smoking for his overall health  I offered him tobacco cessation program as well as aids both of which he refused  His hypoxemia has resolved and is back to his baseline health  I did office spirometry on him today which was inconclusive but suggests a mild COPD so will get full pulmonary function tests and follow up with him if these are abnormal       Diagnoses and all orders for this visit:    Pneumonia of right lower lobe due to infectious organism  -     Ambulatory Referral to Pulmonology  -     POCT spirometry  -     CT chest without contrast; Future  -     Complete PFT without post bronchodilator; Future    Cigarette nicotine dependence without complication  -     Ambulatory Referral to Pulmonology    Abnormal results of pulmonary function studies   -     POCT spirometry          Subjective:      Patient ID: Mariano Crain is a 78 y o  male  Raynette Gold is here for follow-up of his most recent hospitalization for pneumonia  Of note prior to his pneumonia in June he had no respiratory illness or complaints  Denies any history of asthma frequent bronchitis or pneumonia in the past   He feels back to his baseline  He was on oxygen for about a week after discharge, but subsequently been taken away from his home at his request   He still smokes a pack a day worked as a mailman and in [employer]  He has no pets in his home or any recent travel  He denies any cough mucus or shortness of breath        The following portions of the patient's history were reviewed and updated as appropriate: allergies, current medications, past family history, past medical history, past social history, past surgical history and problem list     Review of Systems   Constitutional: Negative  HENT: Negative  Eyes: Negative  Respiratory: Negative for shortness of breath  Cardiovascular: Negative  Gastrointestinal: Negative  Endocrine: Negative  Genitourinary: Negative  Allergic/Immunologic: Negative  Neurological: Negative  Hematological: Negative  Psychiatric/Behavioral: Negative  Objective:      /80 (BP Location: Left arm, Patient Position: Sitting)   Pulse 70   Temp 97 7 °F (36 5 °C)   Ht 6' (1 829 m)   Wt 79 8 kg (176 lb)   SpO2 98%   BMI 23 87 kg/m²          Physical Exam  Constitutional:       Appearance: He is well-developed  HENT:      Head: Normocephalic  Eyes:      Pupils: Pupils are equal, round, and reactive to light  Cardiovascular:      Rate and Rhythm: Normal rate  Pulmonary:      Effort: Pulmonary effort is normal  No respiratory distress  Breath sounds: No wheezing or rales  Abdominal:      Palpations: Abdomen is soft  Musculoskeletal:         General: Normal range of motion  Cervical back: Neck supple  Skin:     General: Skin is warm and dry  Neurological:      Mental Status: He is alert and oriented to person, place, and time

## 2022-09-02 NOTE — ASSESSMENT & PLAN NOTE
I reviewed clearance x-ray from his hospitalization which appears to be bacterial pneumonia however given his continued smoking status I would like to follow-up with a CT scan of the lungs  Will schedule a CT scan for lung cancer screening and colon with the results  We talked about tobacco cessation the importance of quitting smoking for his overall health  I offered him tobacco cessation program as well as aids both of which he refused  His hypoxemia has resolved and is back to his baseline health    I did office spirometry on him today which was inconclusive but suggests a mild COPD so will get full pulmonary function tests and follow up with him if these are abnormal

## 2022-09-08 ENCOUNTER — ANESTHESIA (OUTPATIENT)
Dept: ANESTHESIOLOGY | Facility: AMBULATORY SURGERY CENTER | Age: 67
End: 2022-09-08

## 2022-09-08 ENCOUNTER — ANESTHESIA EVENT (OUTPATIENT)
Dept: ANESTHESIOLOGY | Facility: AMBULATORY SURGERY CENTER | Age: 67
End: 2022-09-08

## 2022-09-09 ENCOUNTER — ANESTHESIA EVENT (OUTPATIENT)
Dept: GASTROENTEROLOGY | Facility: AMBULATORY SURGERY CENTER | Age: 67
End: 2022-09-09

## 2022-09-09 ENCOUNTER — ANESTHESIA (OUTPATIENT)
Dept: GASTROENTEROLOGY | Facility: AMBULATORY SURGERY CENTER | Age: 67
End: 2022-09-09

## 2022-09-09 ENCOUNTER — HOSPITAL ENCOUNTER (OUTPATIENT)
Dept: GASTROENTEROLOGY | Facility: AMBULATORY SURGERY CENTER | Age: 67
Discharge: HOME | End: 2022-09-09
Payer: MEDICARE

## 2022-09-09 VITALS
DIASTOLIC BLOOD PRESSURE: 85 MMHG | BODY MASS INDEX: 24.38 KG/M2 | RESPIRATION RATE: 11 BRPM | HEIGHT: 72 IN | TEMPERATURE: 98.6 F | OXYGEN SATURATION: 98 % | WEIGHT: 180 LBS | SYSTOLIC BLOOD PRESSURE: 115 MMHG | HEART RATE: 72 BPM

## 2022-09-09 DIAGNOSIS — R19.5 POSITIVE COLORECTAL CANCER SCREENING USING COLOGUARD TEST: ICD-10-CM

## 2022-09-09 PROCEDURE — 45385 COLONOSCOPY W/LESION REMOVAL: CPT | Performed by: INTERNAL MEDICINE

## 2022-09-09 PROCEDURE — 88305 TISSUE EXAM BY PATHOLOGIST: CPT | Performed by: PATHOLOGY

## 2022-09-09 RX ORDER — SODIUM CHLORIDE, SODIUM LACTATE, POTASSIUM CHLORIDE, CALCIUM CHLORIDE 600; 310; 30; 20 MG/100ML; MG/100ML; MG/100ML; MG/100ML
50 INJECTION, SOLUTION INTRAVENOUS CONTINUOUS
Status: DISCONTINUED | OUTPATIENT
Start: 2022-09-09 | End: 2022-09-13 | Stop reason: HOSPADM

## 2022-09-09 RX ORDER — GLYCOPYRROLATE 0.2 MG/ML
INJECTION INTRAMUSCULAR; INTRAVENOUS AS NEEDED
Status: DISCONTINUED | OUTPATIENT
Start: 2022-09-09 | End: 2022-09-09

## 2022-09-09 RX ORDER — PROPOFOL 10 MG/ML
INJECTION, EMULSION INTRAVENOUS AS NEEDED
Status: DISCONTINUED | OUTPATIENT
Start: 2022-09-09 | End: 2022-09-09

## 2022-09-09 RX ADMIN — PROPOFOL 50 MG: 10 INJECTION, EMULSION INTRAVENOUS at 14:49

## 2022-09-09 RX ADMIN — SODIUM CHLORIDE, SODIUM LACTATE, POTASSIUM CHLORIDE, CALCIUM CHLORIDE: 600; 310; 30; 20 INJECTION, SOLUTION INTRAVENOUS at 14:45

## 2022-09-09 RX ADMIN — PROPOFOL 50 MG: 10 INJECTION, EMULSION INTRAVENOUS at 14:39

## 2022-09-09 RX ADMIN — PROPOFOL 100 MG: 10 INJECTION, EMULSION INTRAVENOUS at 14:33

## 2022-09-09 RX ADMIN — PROPOFOL 50 MG: 10 INJECTION, EMULSION INTRAVENOUS at 14:54

## 2022-09-09 RX ADMIN — PROPOFOL 50 MG: 10 INJECTION, EMULSION INTRAVENOUS at 14:59

## 2022-09-09 RX ADMIN — GLYCOPYRROLATE 0.2 MG: 0.2 INJECTION INTRAMUSCULAR; INTRAVENOUS at 14:32

## 2022-09-09 RX ADMIN — PROPOFOL 50 MG: 10 INJECTION, EMULSION INTRAVENOUS at 14:43

## 2022-09-09 RX ADMIN — SODIUM CHLORIDE, SODIUM LACTATE, POTASSIUM CHLORIDE, CALCIUM CHLORIDE 50 ML/HR: 600; 310; 30; 20 INJECTION, SOLUTION INTRAVENOUS at 14:02

## 2022-09-09 RX ADMIN — PROPOFOL 50 MG: 10 INJECTION, EMULSION INTRAVENOUS at 15:00

## 2022-09-09 RX ADMIN — PROPOFOL 50 MG: 10 INJECTION, EMULSION INTRAVENOUS at 14:36

## 2022-09-09 RX ADMIN — PROPOFOL 50 MG: 10 INJECTION, EMULSION INTRAVENOUS at 14:35

## 2022-09-09 NOTE — H&P
History and Physical - [facility]  THE [facility] 78 y o  male MRN: [id number]    HPI: THE [facility] is a 78y o  year old male who presents for colonoscopy for positive Cologuard    REVIEW OF SYSTEMS: Per the HPI, and otherwise unremarkable      Historical Information   Past Medical History:   Diagnosis Date    Arthritis     both knees    Hypertension 10/01/20    Dr Anand Hernandez Pneumonia     Prostate cancer [facility])      Past Surgical History:   Procedure Laterality Date    CARPAL TUNNEL RELEASE  2015    Both wrists    OH PLACE RADIOTHER DEVICE/MARKER, PROSTATE N/A 7/29/2021    Procedure: INSERTION OF FIDUCIAL MARKER (TRANSRECTAL ULTRASOUND GUIDANCE), SPACEOAR;  Surgeon: Savita Vigil MD;  Location: [facility];  Service: Urology    TONSILLECTOMY       Social History   Social History     Substance and Sexual Activity   Alcohol Use Yes    Alcohol/week: 28 0 standard drinks    Types: 28 Shots of liquor per week    Comment: drinks fifth of alcohol during week     Social History     Substance and Sexual Activity   Drug Use Yes    Types: Marijuana    Comment: daily     Social History     Tobacco Use   Smoking Status Current Every Day Smoker    Packs/day: 1 00    Years: 40 00    Pack years: 40 00    Types: Cigarettes    Start date: 4/27/1980   Smokeless Tobacco Never Used     Family History   Problem Relation Age of Onset    Breast cancer Mother 48    Substance Abuse Neg Hx     Mental illness Neg Hx     Colon cancer Neg Hx     Colon polyps Neg Hx        Meds/Allergies       Current Outpatient Medications:     Sod Picosulfate-Mag Ox-Cit Acd (Clenpiq) 10-3 5-12 MG-GM -GM/160ML SOLN    Diclofenac Sodium (VOLTAREN) 1 %    Naproxen Sodium (ALEVE PO)    sildenafil (VIAGRA) 100 mg tablet    Current Facility-Administered Medications:     lactated ringers infusion, 50 mL/hr, Intravenous, Continuous, 50 mL/hr at 09/09/22 1402    No Known Allergies    Objective     BP (!) 140/103 Pulse 102   Temp 98 6 °F (37 °C) (Temporal)   Resp 14   Ht 6' (1 829 m)   Wt 81 6 kg (180 lb)   SpO2 99%   BMI 24 41 kg/m²     PHYSICAL EXAM    Gen: NAD AAOx3  Head: Normocephalic, Atraumatic  CV: S1S2 RRR no m/r/g  CHEST: Clear b/l no c/r/w  ABD: soft, +BS NT/ND  EXT: no edema    ASSESSMENT/PLAN:  This is a 78y o  year old male here for colonoscopy, and he is stable and optimized for his procedure

## 2022-09-09 NOTE — ANESTHESIA POSTPROCEDURE EVALUATION
Post-Op Assessment Note    CV Status:  Stable  Pain Score: 0    Pain management: adequate     Mental Status:  Awake and sleepy   Hydration Status:  Euvolemic and stable   PONV Controlled:  None   Airway Patency:  Patent      Post Op Vitals Reviewed: Yes      Staff: CRNA         No complications documented      /81 (09/09/22 1504)    Temp      Pulse 92 (09/09/22 1504)   Resp (!) 26 (09/09/22 1504)    SpO2 100 % (09/09/22 1504)

## 2022-09-09 NOTE — ANESTHESIA PREPROCEDURE EVALUATION
Procedure:  COLONOSCOPY    Relevant Problems   CARDIO   (+) Essential hypertension      /RENAL   (+) Prostate cancer ([facility] [address] )      MUSCULOSKELETAL   (+) Primary osteoarthritis of both knees      PULMONARY   (+) Right lower lobe pneumonia   (+) Smoking      RLLPneumonia 6/22  Admitted at [address] [place] [address] for 3 days  Resolved  Physical Exam    Airway    Mallampati score: II  TM Distance: >3 FB  Neck ROM: full     Dental   No notable dental hx     Cardiovascular      Pulmonary  Breath sounds clear to auscultation,     Other Findings        Anesthesia Plan  ASA Score- 3     Anesthesia Type- IV sedation with anesthesia with ASA Monitors  Additional Monitors:   Airway Plan:           Plan Factors-Exercise tolerance (METS): >4 METS  Chart reviewed  Patient is a current smoker (1ppd)  Patient smoked on day of surgery ( 4 today)  Induction- intravenous  Postoperative Plan-     Informed Consent- Anesthetic plan and risks discussed with patient  I personally reviewed this patient with the CRNA  Discussed and agreed on the Anesthesia Plan with the CRNA  Thom Erwin

## 2022-09-13 ENCOUNTER — HOSPITAL ENCOUNTER (OUTPATIENT)
Dept: CT IMAGING | Facility: HOSPITAL | Age: 67
Discharge: HOME | End: 2022-09-13
Attending: INTERNAL MEDICINE
Payer: MEDICARE

## 2022-09-13 DIAGNOSIS — J18.9 PNEUMONIA OF RIGHT LOWER LOBE DUE TO INFECTIOUS ORGANISM: ICD-10-CM

## 2022-09-13 PROCEDURE — 71250 CT THORAX DX C-: CPT

## 2022-09-13 PROCEDURE — G1004 CDSM NDSC: HCPCS

## 2022-09-20 NOTE — RESULT ENCOUNTER NOTE
Polyps benign 1 year recall given the size of the polyp    Discussed with patient he understands the importance of a 1 year recall

## 2022-10-11 PROBLEM — J18.9 RIGHT LOWER LOBE PNEUMONIA: Status: RESOLVED | Noted: 2022-06-14 | Resolved: 2022-10-11

## 2022-10-21 ENCOUNTER — APPOINTMENT (OUTPATIENT)
Dept: LAB | Facility: HOSPITAL | Age: 67
End: 2022-10-21
Payer: MEDICARE

## 2022-10-21 DIAGNOSIS — C61 PROSTATE CANCER (HCC): ICD-10-CM

## 2022-10-21 LAB — PSA SERPL-MCNC: 0.6 NG/ML (ref 0–4)

## 2022-10-21 PROCEDURE — 84153 ASSAY OF PSA TOTAL: CPT

## 2022-10-25 ENCOUNTER — OFFICE VISIT (OUTPATIENT)
Dept: UROLOGY | Facility: HOSPITAL | Age: 67
End: 2022-10-25
Payer: MEDICARE

## 2022-10-25 VITALS
HEIGHT: 72 IN | WEIGHT: 185 LBS | SYSTOLIC BLOOD PRESSURE: 118 MMHG | DIASTOLIC BLOOD PRESSURE: 78 MMHG | HEART RATE: 75 BPM | OXYGEN SATURATION: 97 % | BODY MASS INDEX: 25.06 KG/M2

## 2022-10-25 DIAGNOSIS — C61 PROSTATE CANCER (HCC): Primary | ICD-10-CM

## 2022-10-25 PROCEDURE — 99213 OFFICE O/P EST LOW 20 MIN: CPT | Performed by: NURSE PRACTITIONER

## 2022-10-25 NOTE — PROGRESS NOTES
10/25/22    [facility]   1955   [id number]     Assessment  1 [place] prostate cancer s/p radiation therapy (10/2021)  2 ED      Discussion/Plan  1 [place] prostate cancer s/p radiation therapy (10/2021)              6/3/22 PSA 0 6              Follow up with Radiation Oncology 6/2023    AUA 9   2 ED               Continue 100 mg Sildenafil PRN      Patient will return with next PSA in 3 months  All questions answered at this time      Anna GEIGER   Silvana Boyer is a 75year old male known to Dr Any Green who presents in follow up  He has history of [place] 7 prostate cancer  He had his first prostate biopsy on 4/28/2021 which was positive in 6/12 cores bilaterally for Rachel score 4+3=7 disease in 3 cores with the other 3 cores showing 3+4=7 disease  His bone scan and CT were negative for metastatic disease  He elected to pursue radiation therapy and he was referred to Radiation Oncology  He had fiducial markers and SpaceOAR placed in July 2021 by Dr Josh Marquez  He was diagnosed when his PSA was 10 7   He is a current 1 ppd smoker x 40 years  He takes 100 mg Sildenafil as needed for erectile dysfunction  He denies lower urinary jerome symptoms, pain, or gross hematuria  He completed Radiation in October 2021  He is accompanied by his wife today  His PSA has gone down to 0 6  He feels well  His appetite is returning and he is gaining weight  He is sleeping well  Denies pain       Component      Latest Ref Rng & Units 12/8/2020 3/12/2021 9/3/2021 12/1/2021           9:05 AM  8:51 AM 12:06 PM 11:38 AM   PSA, Total      0 0 - 4 0 ng/mL 8 0 (H) 10 7 (H) 9 1 (H) 2 8     Component      Latest Ref Rng & Units 4/21/2022 6/3/2022 10/21/2022          10:01 AM  8:43 AM 11:57 AM   PSA, Total      0 0 - 4 0 ng/mL 1 0 1 0 0 6     Review of Systems - History obtained from chart review and the patient  General ROS: negative  Psychological ROS: negative  Respiratory ROS: negative  Cardiovascular ROS: negative  Gastrointestinal ROS: negative  Genito-Urinary ROS: positive for - erectile dysfunction  Musculoskeletal ROS: negative  Neurological ROS: no TIA or stroke symptoms  Dermatological ROS: negative       Objective  Physical Exam  Vitals and nursing note reviewed  Constitutional:       General: He is awake  He is not in acute distress  Appearance: Normal appearance  He is well-developed, well-groomed and normal weight  He is not ill-appearing, toxic-appearing or diaphoretic  Pulmonary:      Effort: Pulmonary effort is normal    Abdominal:      Tenderness: There is no right CVA tenderness or left CVA tenderness  Musculoskeletal:         General: Normal range of motion  Cervical back: Normal range of motion and neck supple  Skin:     General: Skin is warm  Neurological:      General: No focal deficit present  Mental Status: He is alert and oriented to person, place, and time  Mental status is at baseline  Psychiatric:         Attention and Perception: Attention normal          Mood and Affect: Mood normal          Speech: Speech normal          Behavior: Behavior normal  Behavior is cooperative  Thought Content: Thought content normal          Cognition and Memory: Cognition normal          Judgment: Judgment normal        AUA SYMPTOM SCORE    Flowsheet Row Most Recent Value   AUA SYMPTOM SCORE    How often have you had a sensation of not emptying your bladder completely after you finished urinating? 1   How often have you had to urinate again less than two hours after you finished urinating? 1   How often have you found you stopped and started again several times when you urinate? 2   How often have you found it difficult to postpone urination? 0   How often have you had a weak urinary stream? 2   How often have you had to push or strain to begin urination?  2   How many times did you most typically get up to urinate from the time you went to bed at night until the time you got up in the morning? 1   Quality of Life: If you were to spend the rest of your life with your urinary condition just the way it is now, how would you feel about that? 2   AUA SYMPTOM SCORE 9            Orville Pruitt     I have spent 15 minutes with Patient  today in which greater than 50% of this time was spent in counseling/coordination of care regarding Intructions for management

## 2022-10-26 ENCOUNTER — HOSPITAL ENCOUNTER (OUTPATIENT)
Dept: PULMONOLOGY | Facility: HOSPITAL | Age: 67
Discharge: HOME | End: 2022-10-26
Attending: INTERNAL MEDICINE

## 2022-10-26 DIAGNOSIS — J18.9 PNEUMONIA OF RIGHT LOWER LOBE DUE TO INFECTIOUS ORGANISM: ICD-10-CM

## 2022-11-16 ENCOUNTER — RA CDI HCC (OUTPATIENT)
Dept: OTHER | Facility: HOSPITAL | Age: 67
End: 2022-11-16

## 2022-11-16 NOTE — PROGRESS NOTES
[facility] [address]  coding opportunities       Chart reviewed, no opportunity found: CHART REVIEWED, NO OPPORTUNITY FOUND        Patients Insurance     Medicare Insurance: Medicare

## 2022-11-23 ENCOUNTER — TELEPHONE (OUTPATIENT)
Dept: ADMINISTRATIVE | Facility: OTHER | Age: 67
End: 2022-11-23

## 2022-11-23 ENCOUNTER — OFFICE VISIT (OUTPATIENT)
Dept: FAMILY MEDICINE CLINIC | Facility: CLINIC | Age: 67
End: 2022-11-23

## 2022-11-23 VITALS
TEMPERATURE: 97.2 F | WEIGHT: 178 LBS | SYSTOLIC BLOOD PRESSURE: 160 MMHG | OXYGEN SATURATION: 99 % | DIASTOLIC BLOOD PRESSURE: 98 MMHG | HEART RATE: 85 BPM | HEIGHT: 72 IN | BODY MASS INDEX: 24.11 KG/M2 | RESPIRATION RATE: 18 BRPM

## 2022-11-23 DIAGNOSIS — I10 ESSENTIAL HYPERTENSION: ICD-10-CM

## 2022-11-23 DIAGNOSIS — S46.912A STRAIN OF LEFT SHOULDER, INITIAL ENCOUNTER: Primary | ICD-10-CM

## 2022-11-23 DIAGNOSIS — C61 PROSTATE CANCER (HCC): ICD-10-CM

## 2022-11-23 NOTE — PROGRESS NOTES
[address]         NAME: Teri Herman is a 75 y o  male  : 1955    MRN: [id number]  DATE: 2022  TIME: 9:44 AM    Assessment and Plan   Strain of left shoulder, initial encounter [A83 [id number]]  1  Strain of left shoulder, initial encounter        2  Prostate cancer ([address] )        3  Essential hypertension            Prostate cancer [facility])  Patient has finished radiation  Has follow-up with Urology 2023  Patient Instructions     Patient Instructions   Continue over-the-counter medications for left shoulder pain  Continue heat either heating pad or topical Jassi-Angela cetera  Monitor blood pressure over the next several weeks  If consistently above 140/90 I would restart the amlodipine 5 mg daily  Call if you need a prescription  Chief Complaint     Chief Complaint   Patient presents with   • Shoulder Pain         History of Present Illness       Patient comes in today for evaluation of left shoulder pain  Did have a COVID booster along with flu shot in that arm proximally 3 weeks ago  Several days later he was moving some [employer] logs around and had increasing pain  That occurred 10-12 days ago  Has been taking a leave it it does seem to be getting better  Does have good range of motion  High blood pressure-blood pressures have been under control without medication since his hospitalization  Has not checked it recently at home  Review of Systems   Review of Systems   Constitutional: Negative for appetite change, chills, diaphoresis and fever  HENT: Negative for congestion, ear pain, rhinorrhea, sinus pressure and sore throat  Eyes: Negative for discharge, redness and itching  Respiratory: Negative for cough, shortness of breath and wheezing  Cardiovascular: Negative for chest pain and palpitations  Rapid or slow heart rate   Gastrointestinal: Negative for diarrhea, nausea and vomiting     Musculoskeletal: Positive for arthralgias and myalgias  Neurological: Negative for light-headedness and headaches  Current Medications       Current Outpatient Medications:   •  Naproxen Sodium (ALEVE PO), Take by mouth 2 (two) times a day, Disp: , Rfl:   •  sildenafil (VIAGRA) 100 mg tablet, Take 1 tablet (100 mg total) by mouth if needed for erectile dysfunction Take on empty stomach, 1 hour prior to sexual activity, no alcohol  100 mg max dose, Disp: 30 tablet, Rfl: 3  •  Diclofenac Sodium (VOLTAREN) 1 %, Apply 2 g topically 4 (four) times a day for 7 days, Disp: 56 g, Rfl: 0    Current Allergies     Allergies as of 11/23/2022   • (No Known Allergies)            The following portions of the patient's history were reviewed and updated as appropriate: allergies, current medications, past family history, past medical history, past social history, past surgical history and problem list      Past Medical History:   Diagnosis Date   • Allergic May 1 2022    Hayvever   • Arthritis     both knees   • Hypertension 10/01/20    Dr Arsh Cottrell   • Pneumonia    • Prostate cancer [facility])        Past Surgical History:   Procedure Laterality Date   • CARPAL TUNNEL RELEASE  2015    Both wrists   • NV PLACE RADIOTHER DEVICE/MARKER, PROSTATE N/A 7/29/2021    Procedure: INSERTION OF FIDUCIAL MARKER (TRANSRECTAL ULTRASOUND GUIDANCE), Jaqueline Allison;  Surgeon: Zenia Meyer MD;  Location: [facility];  Service: Urology   • TONSILLECTOMY         Family History   Problem Relation Age of Onset   • Breast cancer Mother    • Substance Abuse Neg Hx    • Mental illness Neg Hx    • Colon cancer Neg Hx    • Colon polyps Neg Hx          Medications have been verified  Objective   /98 (BP Location: Right arm, Patient Position: Sitting, Cuff Size: Adult)   Pulse 85   Temp (!) 97 2 °F (36 2 °C) (Tympanic)   Resp 18   Ht 6' 0 05" (1 83 m)   Wt 80 7 kg (178 lb)   SpO2 99%   BMI 24 11 kg/m²        Physical Exam     Physical Exam  Vitals reviewed  Constitutional:       General: He is not in acute distress  Appearance: He is well-developed  He is not ill-appearing  HENT:      Head: Normocephalic and atraumatic  Right Ear: No drainage  Left Ear: No drainage  Nose: No congestion  Mouth/Throat:      Pharynx: No oropharyngeal exudate or posterior oropharyngeal erythema  Eyes:      General:         Right eye: No discharge  Left eye: No discharge  Extraocular Movements: Extraocular movements intact  Conjunctiva/sclera: Conjunctivae normal       Pupils: Pupils are equal, round, and reactive to light  Neck:      Thyroid: No thyromegaly  Cardiovascular:      Rate and Rhythm: Normal rate and regular rhythm  Heart sounds: Normal heart sounds  Pulmonary:      Effort: Pulmonary effort is normal  No respiratory distress  Breath sounds: No wheezing or rales  Musculoskeletal:      Left shoulder: Tenderness and bony tenderness present  No deformity  Normal range of motion  Normal strength  Cervical back: Normal range of motion and neck supple  Lymphadenopathy:      Cervical: No cervical adenopathy  Skin:     Findings: No rash  Neurological:      Mental Status: He is alert

## 2022-11-23 NOTE — PATIENT INSTRUCTIONS
Continue over-the-counter medications for left shoulder pain  Continue heat either heating pad or topical Jassi-Angela cetera  Monitor blood pressure over the next several weeks  If consistently above 140/90 I would restart the amlodipine 5 mg daily  Call if you need a prescription

## 2022-11-23 NOTE — TELEPHONE ENCOUNTER
----- Message from Josette Martinez sent at 11/23/2022  8:40 AM EST -----  Regarding: Quality Outreach  11/23/22 8:41 AM    Hello, our patient Neal Haro has had COVID-19/INFLUENZA VACCINATION completed/performed   Please assist in updating the patient chart by [address], [phone]    The date of service is THIS YEAR      Thank you,  Rosette Eng [facility]

## 2022-11-23 NOTE — LETTER
Vaccination Request Form: EVYCB-12 aka SARS-CoV-2 (Michael Cortez or Last Turner or J & J)      Date Requested: 22  Patient: Ewa Núñez  Patient : 1955   Referring Provider: Ngoc Zapata MD       The above patient has informed us that they have had their   most recent COVID-19 aka SARS-CoV-2 (Michael Cortez or Last Turner or J & ELIEL) administered at your facility  Please   complete this form and attach all corresponding documentation  Date of Vaccine(s) Given  ______________________________    Lot Number(s) _______________________________________    Manufacture(s) ______________________________________    Dose Amount (s) _____________________________________    Expiration Date(s) ____________________________________    Comments __________________________________________________________  ____________________________________________________________________  ____________________________________________________________________  ____________________________________________________________________    Administering Facility  ________________________________________________    Vaccine Administered By (print name) ___________________________________      Form Completed By (print name) _______________________________________      Signature ___________________________________________________________      These reports are needed for  compliance  Please fax this completed form and a copy of the Vaccine Document(s) to our office located at [place] [address] as soon as possible to [phone] nimo Mukherjee: Phone [phone]    We thank you for your assistance in treating our mutual patient

## 2022-11-23 NOTE — LETTER
Vaccination Request Form: Influenza      Date Requested: 22  Patient: Eriberto Garcia  Patient : 1955   Referring Provider: Ankita Ledezma MD       The above patient has informed us that they have had their   most recent Influenza administered at your facility  Please   complete this form and attach all corresponding documentation  Date of Vaccine(s) Given  ______________________________    Lot Number(s) _______________________________________    Manufacture(s) ______________________________________    Dose Amount (s) _____________________________________    Expiration Date(s) ____________________________________    Comments __________________________________________________________  ____________________________________________________________________  ____________________________________________________________________  ____________________________________________________________________    Administering Facility  ________________________________________________    Vaccine Administered By (print name) ___________________________________      Form Completed By (print name) _______________________________________      Signature ___________________________________________________________      These reports are needed for  compliance  Please fax this completed form and a copy of the Vaccine Document(s) to our office located at [place] [address] as soon as possible to [phone] nimo Barth: Phone [phone]    We thank you for your assistance in treating our mutual patient

## 2022-11-23 NOTE — TELEPHONE ENCOUNTER
Upon review of the In Basket request and the patient's chart, initial outreach has been made via fax to facility  Please see Contacts section for details       Thank you  Vishal Franco

## 2022-11-27 NOTE — TELEPHONE ENCOUNTER
Upon review of the In Basket request we were able to locate, review, and update the patient chart as requested for Immunization(s) Covid Booster and Influenzia  Any additional questions or concerns should be emailed to the Practice Liaisons via the appropriate education email address, please do not reply via In Basket      Thank you  Dimitri Oconnor

## 2023-01-09 ENCOUNTER — TELEPHONE (OUTPATIENT)
Dept: FAMILY MEDICINE CLINIC | Facility: CLINIC | Age: 68
End: 2023-01-09

## 2023-01-09 NOTE — TELEPHONE ENCOUNTER
----- [facility] sent at 1/6/2023 12:22 PM EST -----  Regarding: Arthritis Pain Relief  Contact: [phone]  Dr Minesh Holt,  Please know that I haven't received a response to my message sent on 12/17/202, regarding a Medical Marijuana prescription to help relief my constant joint pain  Thank you,  THE [facility]      Contacted patient-informed patient that [facility] does not participate with medical marijuana

## 2023-01-23 ENCOUNTER — TELEPHONE (OUTPATIENT)
Dept: OTHER | Facility: OTHER | Age: 68
End: 2023-01-23

## 2023-01-23 NOTE — TELEPHONE ENCOUNTER
Patient's wife states patient has an appointment on Thursday for a follow up on patient's PSA  Patient's wife is calling to confirm patient has an order for a new PSA  Patient's wife was informed the order is active and he can go to any [facility]'s lab to have it drawn

## 2023-01-25 ENCOUNTER — APPOINTMENT (OUTPATIENT)
Dept: LAB | Facility: HOSPITAL | Age: 68
End: 2023-01-25

## 2023-01-25 DIAGNOSIS — C61 PROSTATE CANCER (HCC): ICD-10-CM

## 2023-01-25 LAB — PSA SERPL-MCNC: 0.6 NG/ML (ref 0–4)

## 2023-01-26 ENCOUNTER — OFFICE VISIT (OUTPATIENT)
Dept: UROLOGY | Facility: HOSPITAL | Age: 68
End: 2023-01-26

## 2023-01-26 VITALS
HEART RATE: 88 BPM | SYSTOLIC BLOOD PRESSURE: 100 MMHG | BODY MASS INDEX: 23.84 KG/M2 | DIASTOLIC BLOOD PRESSURE: 78 MMHG | WEIGHT: 176 LBS | OXYGEN SATURATION: 98 %

## 2023-01-26 DIAGNOSIS — G89.29 CHRONIC PAIN OF BOTH WRISTS: ICD-10-CM

## 2023-01-26 DIAGNOSIS — M25.531 CHRONIC PAIN OF BOTH WRISTS: ICD-10-CM

## 2023-01-26 DIAGNOSIS — N52.9 ERECTILE DYSFUNCTION, UNSPECIFIED ERECTILE DYSFUNCTION TYPE: ICD-10-CM

## 2023-01-26 DIAGNOSIS — M25.532 CHRONIC PAIN OF BOTH WRISTS: ICD-10-CM

## 2023-01-26 DIAGNOSIS — M17.0 PRIMARY OSTEOARTHRITIS OF BOTH KNEES: Primary | ICD-10-CM

## 2023-01-26 DIAGNOSIS — C61 PROSTATE CANCER (HCC): ICD-10-CM

## 2023-01-26 RX ORDER — SILDENAFIL 100 MG/1
100 TABLET, FILM COATED ORAL AS NEEDED
Qty: 30 TABLET | Refills: 3 | Status: SHIPPED | OUTPATIENT
Start: 2023-01-26

## 2023-01-26 RX ORDER — MULTIVIT-MIN/IRON FUM/FOLIC AC 7.5 MG-4
1 TABLET ORAL DAILY
COMMUNITY

## 2023-01-26 RX ORDER — AMLODIPINE BESYLATE 5 MG/1
5 TABLET ORAL DAILY
COMMUNITY

## 2023-01-26 RX ORDER — CHLORAL HYDRATE 500 MG
CAPSULE ORAL DAILY
COMMUNITY

## 2023-01-26 NOTE — PROGRESS NOTES
01/26/23    [facility]   1955   [id number]     Assessment  1 Stratford 7 prostate cancer s/p radiation therapy (10/2021)  2 ED      Discussion/Plan  1 Strat[place] 7 prostate cancer s/p radiation therapy (10/2021)   1/25/23 0 6              Follow up with Radiation Oncology 6/25/23  2 ED               Continue 100 mg Sildenafil PRN      Patient will return with next PSA in 3 months  All questions answered at this time      Subjective  HPI   Jose Branch is a 75year old male known to Dr Jodee Cardoza who presents in follow up  He has history of Strat[place] 7 prostate cancer  He had his first prostate biopsy on 4/28/2021 which was positive in 6/12 cores bilaterally for Rachel score 4+3=7 disease in 3 cores with the other 3 cores showing 3+4=7 disease  His bone scan and CT were negative for metastatic disease  He elected to pursue radiation therapy and he was referred to Radiation Oncology  He had fiducial markers and SpaceOAR placed in July 2021 by Dr Mabel Cooney  He was diagnosed when his PSA was 10 7   He is a current 1 ppd smoker x 40 years  Hi Gallegos takes 100 mg Sildenafil as needed for erectile dysfunction  He denies lower urinary jerome symptoms, pain, or gross hematuria  He completed Radiation in October 2021  He is accompanied by his wife today  His PSA has gone down to 0 6  He feels well  His appetite is returning and he is gaining weight  He is sleeping well  Denies pain  His biggest complaint is arthritic pain in his wrists and knees  He is having trouble with  strength and is wearing wrist braces       Component      Latest Ref Rng & Units 12/8/2020 3/12/2021 9/3/2021 12/1/2021           9:05 AM  8:51 AM 12:06 PM 11:38 AM   PSA, Total      0 0 - 4 0 ng/mL 8 0 (H) 10 7 (H) 9 1 (H) 2 8     Component      Latest Ref Rng & Units 4/21/2022 6/3/2022 10/21/2022 1/25/2023          10:01 AM  8:43 AM 11:57 AM 11:32 AM   PSA, Total      0 0 - 4 0 ng/mL 1 0 1 0 0 6 0 6       Review of Systems - History obtained from chart review and the patient  General ROS: negative  Psychological ROS: negative  Respiratory ROS: no cough, shortness of breath, or wheezing  Cardiovascular ROS: no chest pain or dyspnea on exertion  Gastrointestinal ROS: no abdominal pain, change in bowel habits, or black or bloody stools  Genito-Urinary ROS: no dysuria, trouble voiding, or hematuria  Musculoskeletal ROS: positive for - bilateral wrist pain, bilateral knee pain   Neurological ROS: no TIA or stroke symptoms  Dermatological ROS: negative       Objective   Physical Exam  Vitals and nursing note reviewed  Constitutional:       General: He is awake  He is not in acute distress  Appearance: Normal appearance  He is well-developed, well-groomed and normal weight  He is not ill-appearing, toxic-appearing or diaphoretic  Pulmonary:      Effort: Pulmonary effort is normal    Abdominal:      Tenderness: There is no right CVA tenderness or left CVA tenderness  Musculoskeletal:         General: Normal range of motion  Cervical back: Normal range of motion and neck supple  Skin:     General: Skin is warm  Neurological:      General: No focal deficit present  Mental Status: He is alert and oriented to person, place, and time  Mental status is at baseline  Psychiatric:         Attention and Perception: Attention normal          Mood and Affect: Mood normal          Speech: Speech normal          Behavior: Behavior normal  Behavior is cooperative  Thought Content: Thought content normal          Cognition and Memory: Cognition normal          Judgment: Judgment normal              Maryln Fabry, CRNP     I have spent 15 minutes with Patient and family today in which greater than 50% of this time was spent in counseling/coordination of care regarding Diagnostic results

## 2023-01-27 ENCOUNTER — TELEPHONE (OUTPATIENT)
Dept: OBGYN CLINIC | Facility: HOSPITAL | Age: 68
End: 2023-01-27

## 2023-01-27 NOTE — TELEPHONE ENCOUNTER
Caller:     Doctor: Venita Day    Reason for call: Patient would like to schedule appoint for wrist pain. Carpal tunnel sx '14 w/[place].  No records    Call back#: [phone]

## 2023-02-16 ENCOUNTER — PATIENT MESSAGE (OUTPATIENT)
Dept: FAMILY MEDICINE CLINIC | Facility: CLINIC | Age: 68
End: 2023-02-16

## 2023-02-16 DIAGNOSIS — M19.90 ARTHRITIS: ICD-10-CM

## 2023-03-27 ENCOUNTER — APPOINTMENT (OUTPATIENT)
Dept: RADIOLOGY | Facility: CLINIC | Age: 68
End: 2023-03-27

## 2023-03-27 ENCOUNTER — OFFICE VISIT (OUTPATIENT)
Dept: OBGYN CLINIC | Facility: CLINIC | Age: 68
End: 2023-03-27

## 2023-03-27 VITALS
BODY MASS INDEX: 23.57 KG/M2 | WEIGHT: 174 LBS | DIASTOLIC BLOOD PRESSURE: 80 MMHG | SYSTOLIC BLOOD PRESSURE: 103 MMHG | HEIGHT: 72 IN

## 2023-03-27 DIAGNOSIS — G89.29 CHRONIC PAIN OF BOTH WRISTS: ICD-10-CM

## 2023-03-27 DIAGNOSIS — M25.532 CHRONIC PAIN OF BOTH WRISTS: ICD-10-CM

## 2023-03-27 DIAGNOSIS — M25.531 CHRONIC PAIN OF BOTH WRISTS: ICD-10-CM

## 2023-03-27 DIAGNOSIS — M17.0 PRIMARY OSTEOARTHRITIS OF BOTH KNEES: ICD-10-CM

## 2023-03-27 DIAGNOSIS — M25.531 RIGHT WRIST PAIN: ICD-10-CM

## 2023-03-27 DIAGNOSIS — M19.132 SLAC (SCAPHOLUNATE ADVANCED COLLAPSE) OF WRIST, LEFT: Primary | ICD-10-CM

## 2023-03-27 DIAGNOSIS — M19.131 SLAC (SCAPHOLUNATE ADVANCED COLLAPSE) OF WRIST, RIGHT: ICD-10-CM

## 2023-03-27 RX ORDER — BETAMETHASONE SODIUM PHOSPHATE AND BETAMETHASONE ACETATE 3; 3 MG/ML; MG/ML
6 INJECTION, SUSPENSION INTRA-ARTICULAR; INTRALESIONAL; INTRAMUSCULAR; SOFT TISSUE
Status: COMPLETED | OUTPATIENT
Start: 2023-03-27 | End: 2023-03-27

## 2023-03-27 RX ADMIN — BETAMETHASONE SODIUM PHOSPHATE AND BETAMETHASONE ACETATE 6 MG: 3; 3 INJECTION, SUSPENSION INTRA-ARTICULAR; INTRALESIONAL; INTRAMUSCULAR; SOFT TISSUE at 15:55
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
## 2023-05-01 DIAGNOSIS — M19.131 SLAC (SCAPHOLUNATE ADVANCED COLLAPSE) OF WRIST, RIGHT: ICD-10-CM

## 2023-05-01 DIAGNOSIS — M19.132 SLAC (SCAPHOLUNATE ADVANCED COLLAPSE) OF WRIST, LEFT: Primary | ICD-10-CM

## 2023-05-15 NOTE — PROGRESS NOTES
5/17/2023    [facility]  1955  [id number]      Assessment  -Richland 7 prostate cancer s/p external radiation therapy (10/2021)  -Erectile dysfunction    Discussion/Plan  Sheree Martini is a 75 y o  male being managed by Dr Ernestina Lubin  1  Rich[place] 7 prostate cancer s/p XRT (10/2021)-unfortunately, patient did not obtain PSA prior to today's visit  YOSHI unremarkable  Plan to call patient with results  If PSA stable, he will follow-up in 6 months with repeat PSA  We will continue to closely monitor  He will continue to follow with radiation oncology  2  Erectile dysfunction-continue sildenafil as needed prior to sexual activity  Call with results of PSA  He will then follow-up in 6 months with repeat PSA  Patient advised to call sooner with any questions or issues     -All questions answered, patient and wife agrees with plan      History of Present Illness  75 y o  male with a history of Gl 7 prostate cancer presents today for follow up  Patient last seen in the office in January 2023  He is accompanied today by his wife  He underwent his initial prostate biopsy on 4/28/2021 which was positive for 6 of 12 cores of Richland 7 disease  3 cores positive for Rachel 4+3 equal 7 disease and additional 3 cores positive for Richland 3+4 equal 7 disease  Patient completed external beam radiation therapy in October 2021  His last PSA was 0 6  He continues to follow with radiation oncology  Patient denies any lower urinary tract symptoms, gross hematuria, dysuria, or incontinence  He denies any symptoms of bone pain or weight loss  Patient continues to use sildenafil as needed prior to sexual activity  Review of Systems  Review of Systems   Constitutional: Negative  HENT: Negative  Respiratory: Negative  Cardiovascular: Negative  Gastrointestinal: Negative  Genitourinary: Negative for decreased urine volume, difficulty urinating, dysuria, flank pain, frequency, hematuria and urgency  Musculoskeletal: Negative  Skin: Negative  Neurological: Negative  Psychiatric/Behavioral: Negative  Past Medical History  Past Medical History:   Diagnosis Date   • Allergic May 1 2022    Hayvever   • Arthritis     both knees   • Hypertension 10/01/20    Dr Silvia Peguero   • Pneumonia    • Prostate cancer [facility])        Past Social History  Past Surgical History:   Procedure Laterality Date   • CARPAL TUNNEL RELEASE  2015    Both wrists   • [place] PLMT INTERSTITIAL DEV RADIAT TX PROSTATE 1/MULT N/A 7/29/2021    Procedure: INSERTION OF FIDUCIAL MARKER (TRANSRECTAL ULTRASOUND GUIDANCE), SPACEOAR;  Surgeon: Albertina Eastman MD;  [facility];  Service: Urology   • TONSILLECTOMY         Past Family History  Family History   Problem Relation Age of Onset   • Breast cancer Mother    • Substance Abuse Neg Hx    • Mental illness Neg Hx    • Colon cancer Neg Hx    • Colon polyps Neg Hx        Past Social history  Social History     Socioeconomic History   • Marital status: /Civil Union     Spouse name: Not on file   • Number of children: Not on file   • Years of education: Not on file   • Highest education level: Not on file   Occupational History   • Not on file   Tobacco Use   • Smoking status: Every Day     Packs/day: 1 00     Years: 40 00     Pack years: 40 00     Types: Cigarettes     Start date: 4/27/1980   • Smokeless tobacco: Never   Vaping Use   • Vaping Use: Never used   Substance and Sexual Activity   • Alcohol use:  Yes     Alcohol/week: 28 0 standard drinks     Types: 28 Shots of liquor per week     Comment: drinks fifth of alcohol during week   • Drug use: Not Currently     Types: Marijuana     Comment: daily   • Sexual activity: Yes     Partners: Female     Birth control/protection: None   Other Topics Concern   • Not on file   Social History Narrative   • Not on file     Social Determinants of Health     Financial Resource Strain: Not on file   Food Insecurity: No Food Insecurity   • Worried About [facility] in the Last Year: Never true   • Ran Out of Food in the Last Year: Never true   Transportation Needs: No Transportation Needs   • Lack of Transportation (Medical): No   • Lack of Transportation (Non-Medical): No   Physical Activity: Not on file   Stress: Not on file   Social Connections: Not on file   Intimate Partner Violence: Not on file   Housing Stability: Low Risk    • Unable to Pay for Housing in the Last Year: No   • Number of Places Lived in the Last Year: 1   • Unstable Housing in the Last Year: No       Current Medications  Current Outpatient Medications   Medication Sig Dispense Refill   • amLODIPine (NORVASC) 5 mg tablet Take 5 mg by mouth daily     • Diclofenac Sodium (VOLTAREN) 1 % Apply 2 g topically 4 (four) times a day for 7 days 56 g 0   • Misc Natural Products (Osteo Bi-Flex Joint Shield) TABS Take by mouth in the morning     • Multiple Vitamins-Minerals (multivitamin with minerals) tablet Take 1 tablet by mouth daily     • Naproxen Sodium (ALEVE PO) Take by mouth 2 (two) times a day     • Omega-3 Fatty Acids (Omega-3 Fish Oil) 1000 MG CAPS Take by mouth in the morning     • selenium 50 MCG TABS Take 50 mcg by mouth daily     • sildenafil (VIAGRA) 100 mg tablet Take 1 tablet (100 mg total) by mouth if needed for erectile dysfunction Take on empty stomach, 1 hour prior to sexual activity, no alcohol  100 mg max dose 30 tablet 3     No current facility-administered medications for this visit  Allergies  No Known Allergies    Past Medical History, Social History, Family History, medications and allergies were reviewed  Vitals  There were no vitals filed for this visit  Physical Exam  Physical Exam  Constitutional:       Appearance: Normal appearance  He is well-developed  HENT:      Head: Normocephalic  Eyes:      Pupils: Pupils are equal, round, and reactive to light     Pulmonary:      Effort: Pulmonary effort is normal    Abdominal:      Palpations: Abdomen is soft  Genitourinary:     Prostate: Normal       Rectum: Normal       Comments: Prostate 50 g, nontender, no nodules  Musculoskeletal:         General: Normal range of motion  Cervical back: Normal range of motion  Skin:     General: Skin is warm and dry  Neurological:      General: No focal deficit present  Mental Status: He is alert and oriented to person, place, and time  Psychiatric:         Mood and Affect: Mood normal          Behavior: Behavior normal          Thought Content: Thought content normal          Judgment: Judgment normal          Results    I have personally reviewed all pertinent lab results and reviewed with patient  Lab Results   Component Value Date    PSA 0 6 01/25/2023    PSA 0 6 10/21/2022    PSA 1 0 06/03/2022     Lab Results   Component Value Date    GLUCOSE 103 05/14/2014    CALCIUM 9 8 06/24/2022     05/14/2014    K 4 4 06/24/2022    CO2 24 06/24/2022     06/24/2022    BUN 12 06/24/2022    CREATININE 1 02 06/24/2022     Lab Results   Component Value Date    WBC 8 35 06/17/2022    HGB 10 3 (L) 06/17/2022    HCT 29 6 (L) 06/17/2022    MCV 83 06/17/2022     (H) 06/17/2022     No results found for this or any previous visit (from the past 1 hour(s))

## 2023-05-16 ENCOUNTER — TELEPHONE (OUTPATIENT)
Dept: UROLOGY | Facility: MEDICAL CENTER | Age: 68
End: 2023-05-16

## 2023-05-16 NOTE — TELEPHONE ENCOUNTER
Patient seen by Jodi Dhillon at [place]    Patient called stating he was not able to do the psa level today. He will do it in am and he still wants to come in for this appointment tomorrow at 11 am.  He does not want to make until August for next appointment.      Patient can be reached at [phone]

## 2023-05-17 ENCOUNTER — APPOINTMENT (OUTPATIENT)
Dept: LAB | Facility: HOSPITAL | Age: 68
End: 2023-05-17

## 2023-05-17 ENCOUNTER — OFFICE VISIT (OUTPATIENT)
Dept: UROLOGY | Facility: HOSPITAL | Age: 68
End: 2023-05-17

## 2023-05-17 VITALS
WEIGHT: 175 LBS | OXYGEN SATURATION: 96 % | BODY MASS INDEX: 23.7 KG/M2 | SYSTOLIC BLOOD PRESSURE: 122 MMHG | DIASTOLIC BLOOD PRESSURE: 78 MMHG | HEIGHT: 72 IN | HEART RATE: 75 BPM

## 2023-05-17 DIAGNOSIS — C61 PROSTATE CANCER (HCC): ICD-10-CM

## 2023-05-17 DIAGNOSIS — C61 PROSTATE CANCER (HCC): Primary | ICD-10-CM

## 2023-05-17 LAB — PSA SERPL-MCNC: 0.58 NG/ML (ref 0–4)

## 2023-05-18 ENCOUNTER — TELEPHONE (OUTPATIENT)
Dept: UROLOGY | Facility: AMBULATORY SURGERY CENTER | Age: 68
End: 2023-05-18

## 2023-05-18 NOTE — TELEPHONE ENCOUNTER
----- Message from [id number] Alberto Best sent at 5/18/2023  8:12 AM EDT -----  Recent PSA 0 58, previously 0 6  Plan to follow up in 6 months with PSA as scheduled

## 2023-05-18 NOTE — TELEPHONE ENCOUNTER
Called patient to let him know his PSA results came back at 0 58, previously 0 6  plan to follow up in 6 months with PSA as scheduled  Spoke to pt wife and she understood everything

## 2023-05-22 ENCOUNTER — OFFICE VISIT (OUTPATIENT)
Dept: OBGYN CLINIC | Facility: CLINIC | Age: 68
End: 2023-05-22

## 2023-05-22 VITALS
SYSTOLIC BLOOD PRESSURE: 122 MMHG | DIASTOLIC BLOOD PRESSURE: 76 MMHG | WEIGHT: 181 LBS | HEIGHT: 72 IN | BODY MASS INDEX: 24.52 KG/M2

## 2023-05-22 DIAGNOSIS — M19.131 SLAC (SCAPHOLUNATE ADVANCED COLLAPSE) OF WRIST, RIGHT: Primary | ICD-10-CM

## 2023-05-22 DIAGNOSIS — M19.90 ARTHRITIS: ICD-10-CM

## 2023-05-22 DIAGNOSIS — M19.132 SLAC (SCAPHOLUNATE ADVANCED COLLAPSE) OF WRIST, LEFT: ICD-10-CM

## 2023-05-22 NOTE — PROGRESS NOTES
ASSESSMENT/PLAN:    Assessment:   Bilateral SLAC wrist arthritis     Plan:   Medrol Dosepak was prescribed and sent to his pharmacy electronically   Unable to repeat the radiocarpal joint for aprox  1 more month, at which time Kenalog would be used to see if this is more beneficial for him then Betamethasone    He is a smoker and is not interested in a 4 corner fusion at this time  Follow up in 5 weeks time for bilateral wrist radiocarpal joint CSI's using Kenalog       Follow Up:  5  week(s)    To Do Next Visit:  Bilateral radiocarpal joint CSI's using Kenalog     _____________________________________________________  CHIEF COMPLAINT:  Chief Complaint   Patient presents with   • Left Wrist - Follow-up     SLAC- injection 3/27/23 Beta    • Right Wrist - Follow-up     SLAC- injection 3/27/23 Beta          SUBJECTIVE:  Saroj Lao is a 75 y o  male who presents for follow up regarding bilateral SLAC wrist arthritis  Since last visit, Saroj Lao has tried radiocarpal joint CSI's with partial improvement in pain until aprox  1 week ago  He notes right wrist is worse then left  He will take Aleve as needed for pain control  He would like repeat CSI's today     Radiation: None  Associated symptoms: None  Handedness: right  Work status: retired     PAST MEDICAL HISTORY:  Past Medical History:   Diagnosis Date   • Allergic May 1 2022    Hayvever   • Arthritis     both knees   • Hypertension 10/01/20    Dr Amos Ace   • Pneumonia    • Prostate cancer [facility])        PAST SURGICAL HISTORY:  Past Surgical History:   Procedure Laterality Date   • CARPAL TUNNEL RELEASE  2015    Both wrists   • [place] PLMT INTERSTITIAL DEV RADIAT TX PROSTATE 1/MULT N/A 7/29/2021    Procedure: INSERTION OF FIDUCIAL MARKER (TRANSRECTAL ULTRASOUND GUIDANCE), SPACEOAR;  Surgeon: Toño Tipton MD;  [facility];  Service: Urology   • TONSILLECTOMY         FAMILY HISTORY:  Family History   Problem Relation Age of Onset   • Breast cancer Mother    • Substance Abuse Neg Hx    • Mental illness Neg Hx    • Colon cancer Neg Hx    • Colon polyps Neg Hx        SOCIAL HISTORY:  Social History     Tobacco Use   • Smoking status: Every Day     Packs/day: 1 00     Years: 40 00     Pack years: 40 00     Types: Cigarettes     Start date: 4/27/1980   • Smokeless tobacco: Never   Vaping Use   • Vaping Use: Never used   Substance Use Topics   • Alcohol use: Yes     Alcohol/week: 28 0 standard drinks     Types: 28 Shots of liquor per week     Comment: drinks fifth of alcohol during week   • Drug use: Not Currently     Types: Marijuana     Comment: daily       MEDICATIONS:    Current Outpatient Medications:   •  amLODIPine (NORVASC) 5 mg tablet, Take 5 mg by mouth daily, Disp: , Rfl:   •  Diclofenac Sodium (VOLTAREN) 1 %, Apply 2 g topically 4 (four) times a day for 7 days, Disp: 56 g, Rfl: 0  •  Misc Natural Products (Osteo Bi-Flex Joint Shield) TABS, Take by mouth in the morning, Disp: , Rfl:   •  Multiple Vitamins-Minerals (multivitamin with minerals) tablet, Take 1 tablet by mouth daily, Disp: , Rfl:   •  Naproxen Sodium (ALEVE PO), Take by mouth 2 (two) times a day, Disp: , Rfl:   •  Omega-3 Fatty Acids (Omega-3 Fish Oil) 1000 MG CAPS, Take by mouth in the morning, Disp: , Rfl:   •  selenium 50 MCG TABS, Take 50 mcg by mouth daily, Disp: , Rfl:   •  sildenafil (VIAGRA) 100 mg tablet, Take 1 tablet (100 mg total) by mouth if needed for erectile dysfunction Take on empty stomach, 1 hour prior to sexual activity, no alcohol  100 mg max dose, Disp: 30 tablet, Rfl: 3    ALLERGIES:  No Known Allergies    REVIEW OF SYSTEMS:  Pertinent items are noted in HPI  A comprehensive review of systems was negative      LABS:  HgA1c:   Lab Results   Component Value Date    HGBA1C 5 7 (H) 05/14/2014     BMP:   Lab Results   Component Value Date    GLUCOSE 103 05/14/2014    CALCIUM 9 8 06/24/2022     05/14/2014    K 4 4 06/24/2022    CO2 24 06/24/2022     06/24/2022 BUN 12 06/24/2022    CREATININE 1 02 06/24/2022           _____________________________________________________  PHYSICAL EXAMINATION:  Vital signs: /76   Ht 6' (1 829 m)   Wt 82 1 kg (181 lb)   BMI 24 55 kg/m²   General: well developed and well nourished, alert, oriented times 3 and appears comfortable  Psychiatric: Normal  HEENT: Trachea Midline, No torticollis  Cardiovascular: No discernable arrhythmia  Pulmonary: No wheezing or stridor  Abdomen: No rebound or guarding  Extremities: No peripheral edema  Skin: No masses, erythema, lacerations, fluctation, ulcerations  Neurovascular: Sensation Intact to the Median, Ulnar, Radial Nerve, Motor Intact to the Median, Ulnar, Radial Nerve and Pulses Intact    MUSCULOSKELETAL EXAMINATION:    BILATERAL SIDE:  Wrist: No erythema, ecchymosis or edema, radiocarpal joint effusion, radiocarpal joint tenderness, extension aprox  30 degrees bilaterally, flexion aprox   45 on the left and 30 on the right, full composite fist formation      _____________________________________________________  STUDIES REVIEWED:  No Studies to review      PROCEDURES PERFORMED:  Procedures  No Procedures performed today    Scribe Attestation    I,:  Doug Munoz am acting as a scribe while in the presence of the attending physician :       I,:  Suzanne Herrera MD personally performed the services described in this documentation    as scribed in my presence :

## 2023-05-23 RX ORDER — METHYLPREDNISOLONE 4 MG/1
TABLET ORAL
Qty: 21 EACH | Refills: 0 | Status: SHIPPED | OUTPATIENT
Start: 2023-05-23

## 2023-05-26 ENCOUNTER — OFFICE VISIT (OUTPATIENT)
Dept: FAMILY MEDICINE CLINIC | Facility: CLINIC | Age: 68
End: 2023-05-26

## 2023-05-26 VITALS
WEIGHT: 172 LBS | BODY MASS INDEX: 23.3 KG/M2 | HEIGHT: 72 IN | DIASTOLIC BLOOD PRESSURE: 78 MMHG | SYSTOLIC BLOOD PRESSURE: 130 MMHG | TEMPERATURE: 97.6 F | OXYGEN SATURATION: 99 % | HEART RATE: 101 BPM

## 2023-05-26 DIAGNOSIS — C61 PROSTATE CANCER (HCC): ICD-10-CM

## 2023-05-26 DIAGNOSIS — I10 ESSENTIAL HYPERTENSION: ICD-10-CM

## 2023-05-26 DIAGNOSIS — Z00.00 MEDICARE ANNUAL WELLNESS VISIT, SUBSEQUENT: Primary | ICD-10-CM

## 2023-05-26 PROBLEM — D12.6 COLON ADENOMAS: Status: ACTIVE | Noted: 2023-05-26

## 2023-05-26 RX ORDER — AMLODIPINE BESYLATE 5 MG/1
5 TABLET ORAL DAILY
Qty: 30 TABLET | Refills: 5 | Status: SHIPPED | OUTPATIENT
Start: 2023-05-26

## 2023-05-26 NOTE — PATIENT INSTRUCTIONS
Continue current medications  Get labs as ordered  Follow-up with urology  Looks like you will be requested to have repeat colonoscopy in September of this year  Continue work on decreasing cigarette use  Continue proper diet and remain active  Medicare Preventive Visit Patient Instructions  Thank you for completing your Welcome to Medicare Visit or Medicare Annual Wellness Visit today  Your next wellness visit will be due in one year (5/26/2024)  The screening/preventive services that you may require over the next 5-10 years are detailed below  Some tests may not apply to you based off risk factors and/or age  Screening tests ordered at today's visit but not completed yet may show as past due  Also, please note that scanned in results may not display below  Preventive Screenings:  Service Recommendations Previous Testing/Comments   Colorectal Cancer Screening  Colonoscopy    Fecal Occult Blood Test (FOBT)/Fecal Immunochemical Test (FIT)  Fecal DNA/Cologuard Test  Flexible Sigmoidoscopy Age: 39-70 years old   Colonoscopy: every 10 years (May be performed more frequently if at higher risk)  OR  FOBT/FIT: every 1 year  OR  Cologuard: every 3 years  OR  Sigmoidoscopy: every 5 years  Screening may be recommended earlier than age 39 if at higher risk for colorectal cancer  Also, an individualized decision between you and your healthcare provider will decide whether screening between the ages of 74-80 would be appropriate   Colonoscopy: 09/09/2022  FOBT/FIT: Not on file  Cologuard: 04/28/2022  Sigmoidoscopy: Not on file          Prostate Cancer Screening Individualized decision between patient and health care provider in men between ages of 53-78   Medicare will cover every 12 months beginning on the day after your 50th birthday PSA: 0 58 ng/mL           Hepatitis C Screening Once for adults born between [facility]  More frequently in patients at high risk for Hepatitis C Hep C Antibody: 06/15/2022 Diabetes Screening 1-2 times per year if you're at risk for diabetes or have pre-diabetes Fasting glucose: 100 mg/dL (6/24/2022)  A1C: No results in last 5 years (No results in last 5 years)      Cholesterol Screening Once every 5 years if you don't have a lipid disorder  May order more often based on risk factors  Lipid panel: 12/08/2020         Other Preventive Screenings Covered by Medicare:  Abdominal Aortic Aneurysm (AAA) Screening: covered once if your at risk  You're considered to be at risk if you have a family history of AAA or a male between the age of 73-68 who smoking at least 100 cigarettes in your lifetime  Lung Cancer Screening: covers low dose CT scan once per year if you meet all of the following conditions: (1) Age 50-69; (2) No signs or symptoms of lung cancer; (3) Current smoker or have quit smoking within the last 15 years; (4) You have a tobacco smoking history of at least 20 pack years (packs per day x number of years you smoked); (5) You get a written order from a healthcare provider  Glaucoma Screening: covered annually if you're considered high risk: (1) You have diabetes OR (2) Family history of glaucoma OR (3)  aged 48 and older OR (3)  American aged 72 and older  Osteoporosis Screening: covered every 2 years if you meet one of the following conditions: (1) Have a vertebral abnormality; (2) On glucocorticoid therapy for more than 3 months; (3) Have primary hyperparathyroidism; (4) On osteoporosis medications and need to assess response to drug therapy  HIV Screening: covered annually if you're between the age of 12-76  Also covered annually if you are younger than 13 and older than 72 with risk factors for HIV infection  For pregnant patients, it is covered up to 3 times per pregnancy      Immunizations:  Immunization Recommendations   Influenza Vaccine Annual influenza vaccination during flu season is recommended for all persons aged >= 6 months who do not have contraindications   Pneumococcal Vaccine   * Pneumococcal conjugate vaccine = PCV13 (Prevnar 13), PCV15 (Vaxneuvance), PCV20 (Prevnar 20)  * Pneumococcal polysaccharide vaccine = PPSV23 (Pneumovax) Adults 2364 years old: 1-3 doses may be recommended based on certain risk factors  Adults 72 years old: 1-2 doses may be recommended based off what pneumonia vaccine you previously received   Hepatitis B Vaccine 3 dose series if at intermediate or high risk (ex: diabetes, end stage renal disease, liver disease)   Tetanus (Td) Vaccine - COST NOT COVERED BY MEDICARE PART B Following completion of primary series, a booster dose should be given every 10 years to maintain immunity against tetanus  Td may also be given as tetanus wound prophylaxis  Tdap Vaccine - COST NOT COVERED BY MEDICARE PART B Recommended at least once for all adults  For pregnant patients, recommended with each pregnancy  Shingles Vaccine (Shingrix) - COST NOT COVERED BY MEDICARE PART B  2 shot series recommended in those aged 48 and above     Health Maintenance Due:      Topic Date Due    Colorectal Cancer Screening  09/09/2023    Lung Cancer Screening  09/13/2023    Hepatitis C Screening  Completed     Immunizations Due:      Topic Date Due    COVID-19 Vaccine (5 - Booster for Ni Peter series) 12/26/2022     Advance Directives   What are advance directives? Advance directives are legal documents that state your wishes and plans for medical care  These plans are made ahead of time in case you lose your ability to make decisions for yourself  Advance directives can apply to any medical decision, such as the treatments you want, and if you want to donate organs  What are the types of advance directives? There are many types of advance directives, and each state has rules about how to use them  You may choose a combination of any of the following:  Living will: This is a written record of the treatment you want   You can also choose which treatments you do not want, which to limit, and which to stop at a certain time  This includes surgery, medicine, IV fluid, and tube feedings  [facility] power of  for healthcare [facility]): This is a written record that states who you want to make healthcare choices for you when you are unable to make them for yourself  This person, called a proxy, is usually a family member or a friend  You may choose more than 1 proxy  Do not resuscitate (DNR) order:  A DNR order is used in case your heart stops beating or you stop breathing  It is a request not to have certain forms of treatment, such as CPR  A DNR order may be included in other types of advance directives  Medical directive: This covers the care that you want if you are in a coma, near death, or unable to make decisions for yourself  You can list the treatments you want for each condition  Treatment may include pain medicine, surgery, blood transfusions, dialysis, IV or tube feedings, and a ventilator (breathing machine)  Values history: This document has questions about your views, beliefs, and how you feel and think about life  This information can help others choose the care that you would choose  Why are advance directives important? An advance directive helps you control your care  Although spoken wishes may be used, it is better to have your wishes written down  Spoken wishes can be misunderstood, or not followed  Treatments may be given even if you do not want them  An advance directive may make it easier for your family to make difficult choices about your care  Cigarette Smoking and Your Health   Risks to your health if you smoke:  Nicotine and other chemicals found in tobacco damage every cell in your body  Even if you are a light smoker, you have an increased risk for cancer, heart disease, and lung disease  If you are pregnant or have diabetes, smoking increases your risk for complications     Benefits to your health if you stop smoking: You decrease respiratory symptoms such as coughing, wheezing, and shortness of breath  You reduce your risk for cancers of the lung, mouth, throat, kidney, bladder, pancreas, stomach, and cervix  If you already have cancer, you increase the benefits of chemotherapy  You also reduce your risk for cancer returning or a second cancer from developing  You reduce your risk for heart disease, blood clots, heart attack, and stroke  You reduce your risk for lung infections, and diseases such as pneumonia, asthma, chronic bronchitis, and emphysema  Your circulation improves  More oxygen can be delivered to your body  If you have diabetes, you lower your risk for complications, such as kidney, artery, and eye diseases  You also lower your risk for nerve damage  Nerve damage can lead to amputations, poor vision, and blindness  You improve your body's ability to heal and to fight infections  For more information and support to stop smoking:   Smokefree  [employer]  Phone: 2- 386 - 468-5647  Web Address: www [employer]  [facility] 2018 Information is for End User's use only and may not be sold, redistributed or otherwise used for commercial purposes   All illustrations and images included in CareNotes® are the copyrighted property of [employer]  or [address] [place] [address]

## 2023-05-26 NOTE — PROGRESS NOTES
Assessment and Plan:     Continue current medications  Get labs as ordered  Follow-up with urology  Looks like you will be requested to have repeat colonoscopy in September of this year  Continue work on decreasing cigarette use  Continue proper diet and remain active  Problem List Items Addressed This Visit        Cardiovascular and Mediastinum    Essential hypertension    Relevant Medications    amLODIPine (NORVASC) 5 mg tablet    Other Relevant Orders    CBC and differential    Comprehensive metabolic panel    TSH, 3rd generation       Genitourinary    Prostate cancer ([facility] [address] )   Other Visit Diagnoses     Medicare annual wellness visit, subsequent    -  Primary          Depression Screening and Follow-up Plan: Patient was screened for depression during today's encounter  They screened negative with a PHQ-2 score of 0  Tobacco Cessation Counseling: Tobacco cessation counseling was provided  The patient is sincerely urged to quit consumption of tobacco  He is not ready to quit tobacco  Medication options not discussed  Preventive health issues were discussed with patient, and age appropriate screening tests were ordered as noted in patient's After Visit Summary  Personalized health advice and appropriate referrals for health education or preventive services given if needed, as noted in patient's After Visit Summary  History of Present Illness:     Patient presents for a Medicare Wellness Visit    Here for medical follow-up and Medicare wellness  No recent routine labs  1) hypertension-good control  2) prostate cancer under treatment  Last PSA has decreased  Continues to be followed by urology  3) chronic bilateral wrist pain from previous injury    Currently being treated by Dr Alexa Pierre     Patient Care Team:  Janel Dave MD as PCP - General (Family Medicine)  Janel Dave MD (Family Medicine)     Review of Systems:     Review of Systems   Constitutional: Negative for activity change, appetite change, diaphoresis and fatigue  HENT: Negative for congestion, sinus pressure and sore throat  Respiratory: Negative for cough, chest tightness, shortness of breath and wheezing  Cardiovascular: Negative for chest pain, palpitations and leg swelling  Fast or slow heart rate   Gastrointestinal: Negative for abdominal pain, blood in stool, constipation, diarrhea, nausea and vomiting  Genitourinary: Negative for difficulty urinating, dysuria, frequency and hematuria  Musculoskeletal: Positive for arthralgias  Negative for gait problem, joint swelling and myalgias  Neurological: Negative for dizziness, light-headedness and headaches  Psychiatric/Behavioral: Negative for agitation, confusion, dysphoric mood and sleep disturbance  The patient is not nervous/anxious           Problem List:     Patient Active Problem List   Diagnosis   • Essential hypertension   • Cigarette nicotine dependence without complication   • Elevated PSA   • Prostate cancer ([facility] [address] )   • Smoking   • Primary osteoarthritis of both knees   • Hyponatremia   • Transaminitis   • Unintentional weight loss   • Hypoxemia   • Right wrist pain   • Slac (scapholunate advanced collapse) of wrist, right   • Slac (scapholunate advanced collapse) of wrist, left   • Colon adenomas      Past Medical and Surgical History:     Past Medical History:   Diagnosis Date   • Allergic May 1 2022    [facility]   • Arthritis     both knees   • Hypertension 10/01/20    Dr Rajesh Meléndez   • Pneumonia    • Prostate cancer [facility])      Past Surgical History:   Procedure Laterality Date   • CARPAL TUNNEL RELEASE  2015    Both wrists   • ME PLMT INTERSTITIAL DEV RADIAT TX PROSTATE 1/MULT N/A 7/29/2021    Procedure: INSERTION OF FIDUCIAL MARKER (TRANSRECTAL ULTRASOUND GUIDANCE), SPACEOAR;  Surgeon: Dariana Redman MD;  [facility];  Service: Urology   • TONSILLECTOMY        Family History:     Family History   Problem Relation Age of Onset   • Breast cancer Mother    • Substance Abuse Neg Hx    • Mental illness Neg Hx    • Colon cancer Neg Hx    • Colon polyps Neg Hx       Social History:     Social History     Socioeconomic History   • Marital status: /Civil Union     Spouse name: None   • Number of children: None   • Years of education: None   • Highest education level: None   Occupational History   • None   Tobacco Use   • Smoking status: Every Day     Packs/day: 1 00     Years: 40 00     Total pack years: 40 00     Types: Cigarettes     Start date: 4/27/1980   • Smokeless tobacco: Never   Vaping Use   • Vaping Use: Never used   Substance and Sexual Activity   • Alcohol use: Yes     Alcohol/week: 28 0 standard drinks of alcohol     Types: 28 Shots of liquor per week     Comment: drinks fifth of alcohol during week   • Drug use: Not Currently     Types: Marijuana     Comment: daily   • Sexual activity: Yes     Partners: Female     Birth control/protection: None   Other Topics Concern   • None   Social History Narrative   • None     Social Determinants of Health     Financial Resource Strain: Low Risk  (5/19/2023)    Overall Financial Resource Strain (CARDIA)    • Difficulty of Paying Living Expenses: Not very hard   Food Insecurity: No Food Insecurity (6/15/2022)    Hunger Vital Sign    • Worried About Running Out of Food in the Last Year: Never true    • Ran Out of Food in the Last Year: Never true   Transportation Needs: No Transportation Needs (5/19/2023)    PRAPARE - Transportation    • Lack of Transportation (Medical): No    • Lack of Transportation (Non-Medical):  No   Physical Activity: Not on file   Stress: Not on file   Social Connections: Not on file   Intimate Partner Violence: Not on file   Housing Stability: Low Risk  (6/15/2022)    Housing Stability Vital Sign    • Unable to Pay for Housing in the Last Year: No    • Number of Places Lived in the Last Year: 1    • Unstable Housing in the Last Year: No      Medications and Allergies:     Current Outpatient Medications   Medication Sig Dispense Refill   • amLODIPine (NORVASC) 5 mg tablet Take 1 tablet (5 mg total) by mouth daily 30 tablet 5   • Diclofenac Sodium (VOLTAREN) 1 % Apply 2 g topically 4 (four) times a day for 7 days 56 g 0   • methylPREDNISolone 4 MG tablet therapy pack Use as directed on package 21 each 0   • Misc Natural Products (Osteo Bi-Flex Joint Shield) TABS Take by mouth in the morning     • Multiple Vitamins-Minerals (multivitamin with minerals) tablet Take 1 tablet by mouth daily     • Naproxen Sodium (ALEVE PO) Take by mouth 2 (two) times a day     • Omega-3 Fatty Acids (Omega-3 Fish Oil) 1000 MG CAPS Take by mouth in the morning     • selenium 50 MCG TABS Take 50 mcg by mouth daily     • sildenafil (VIAGRA) 100 mg tablet Take 1 tablet (100 mg total) by mouth if needed for erectile dysfunction Take on empty stomach, 1 hour prior to sexual activity, no alcohol  100 mg max dose 30 tablet 3     No current facility-administered medications for this visit  No Known Allergies   Immunizations:     Immunization History   Administered Date(s) Administered   • COVID-19 PFIZER VACCINE 0 3 ML IM 03/25/2021, 04/15/2021, 01/22/2022, 10/31/2022   • INFLUENZA 11/05/2018, 10/31/2022   • Influenza, high dose seasonal 0 7 mL 10/23/2020, 10/14/2021   • Pneumococcal Conjugate 13-Valent 10/23/2020   • Pneumococcal Conjugate Vaccine 20-valent (Pcv20), Polysace 07/15/2022      Health Maintenance:         Topic Date Due   • Colorectal Cancer Screening  09/09/2023   • Lung Cancer Screening  09/13/2023   • Hepatitis C Screening  Completed         Topic Date Due   • COVID-19 Vaccine (5 - Booster for Pfizer series) 12/26/2022      Medicare Screening Tests and Risk Assessments:     Josh June is here for his Subsequent Wellness visit  Last Medicare Wellness visit information reviewed, patient interviewed and updates made to the record today        Health Risk Assessment:   Patient rates overall health as very good  Patient feels that their physical health rating is much better  Patient is satisfied with their life  Eyesight was rated as slightly worse  Hearing was rated as slightly worse  Patient feels that their emotional and mental health rating is much better  Patients states they are never, rarely angry  Patient states they are sometimes unusually tired/fatigued  Pain experienced in the last 7 days has been some  Patient's pain rating has been 6/10  Patient states that he has experienced no weight loss or gain in last 6 months  Chronic wrist pain  >10 years    Depression Screening:   PHQ-2 Score: 0      Fall Risk Screening: In the past year, patient has experienced: no history of falling in past year      Home Safety:  Patient does not have trouble with stairs inside or outside of their home  Patient has working smoke alarms and has working carbon monoxide detector  Home safety hazards include: none  Nutrition:   Current diet is Regular  Medications:   Patient is not currently taking any over-the-counter supplements  Patient is able to manage medications  Activities of Daily Living (ADLs)/Instrumental Activities of Daily Living (IADLs):   Walk and transfer into and out of bed and chair?: Yes  Dress and groom yourself?: Yes    Bathe or shower yourself?: Yes    Feed yourself?  Yes  Do your laundry/housekeeping?: Yes  Manage your money, pay your bills and track your expenses?: Yes  Make your own meals?: Yes    Do your own shopping?: Yes    Previous Hospitalizations:   Any hospitalizations or ED visits within the last 12 months?: No      Advance Care Planning:   Living will: No    Durable POA for healthcare: No    Advanced directive: No    Five wishes given: Yes      Cognitive Screening:   Provider or family/friend/caregiver concerned regarding cognition?: No    PREVENTIVE SCREENINGS      Cardiovascular Screening:    General: Screening Current      Diabetes Screening:     General: Screening Current Colorectal Cancer Screening:     General: Screening Current      Prostate Cancer Screening:    General: History Prostate Cancer      Osteoporosis Screening:    General: Screening Not Indicated      Abdominal Aortic Aneurysm (AAA) Screening:    Risk factors include: age between 73-69 yo and tobacco use        General: Screening Current      Lung Cancer Screening:     General: Screening Current      Hepatitis C Screening:    General: Screening Current      Preventive Screening Comments: CT abd - no AAA    Screening, Brief Intervention, and Referral to Treatment (SBIRT)    Screening  Typical number of drinks in a day: 1  Typical number of drinks in a week: 5  Interpretation: Low risk drinking behavior  AUDIT-C Screenin) How often did you have a drink containing alcohol in the past year? 2 to 3 times a week  2) How many drinks did you have on a typical day when you were drinking in the past year? 1 to 2  3) How often did you have 6 or more drinks on one occasion in the past year? never    AUDIT-C Score: 3  Interpretation: Score 0-3 (male): Negative screen for alcohol misuse    Single Item Drug Screening:  How often have you used an illegal drug (including marijuana) or a prescription medication for non-medical reasons in the past year? never    Single Item Drug Screen Score: 0  Interpretation: Negative screen for possible drug use disorder    Brief Intervention  Alcohol & drug use screenings were reviewed  No concerns regarding substance use disorder identified  Annual Depression Screening  Time spent screening and evaluating the patient for depression during today's encounter was 5 minutes  Other Counseling Topics:   Car/seat belt/driving safety and regular weightbearing exercise  No results found       Physical Exam:     /78 (BP Location: Left arm, Patient Position: Sitting, Cuff Size: Standard)   Pulse 101   Temp 97 6 °F (36 4 °C) (Tympanic)   Ht 6' (1 829 m)   Wt 78 kg (172 lb) SpO2 99%   BMI 23 33 kg/m²     Physical Exam  Vitals reviewed  Constitutional:       General: He is not in acute distress  Appearance: Normal appearance  He is not ill-appearing  HENT:      Head: Normocephalic  Right Ear: Tympanic membrane normal       Left Ear: Tympanic membrane normal       Nose: Nose normal       Mouth/Throat:      Mouth: Mucous membranes are moist    Eyes:      Extraocular Movements: Extraocular movements intact  Pupils: Pupils are equal, round, and reactive to light  Cardiovascular:      Rate and Rhythm: Normal rate and regular rhythm  Heart sounds: Normal heart sounds  Pulmonary:      Effort: Pulmonary effort is normal       Breath sounds: Normal breath sounds  Musculoskeletal:      Right lower leg: No edema  Left lower leg: No edema  Neurological:      Mental Status: He is alert     Psychiatric:         Mood and Affect: Mood normal          Behavior: Behavior normal           Goldie Arellano MD

## 2023-06-01 ENCOUNTER — APPOINTMENT (OUTPATIENT)
Dept: LAB | Facility: HOSPITAL | Age: 68
End: 2023-06-01
Payer: MEDICARE

## 2023-06-01 DIAGNOSIS — I10 ESSENTIAL HYPERTENSION: ICD-10-CM

## 2023-06-01 LAB
ALBUMIN SERPL BCP-MCNC: 3.9 G/DL (ref 3.5–5)
ALP SERPL-CCNC: 50 U/L (ref 46–116)
ALT SERPL W P-5'-P-CCNC: 19 U/L (ref 12–78)
ANION GAP SERPL CALCULATED.3IONS-SCNC: 4 MMOL/L (ref 4–13)
AST SERPL W P-5'-P-CCNC: 18 U/L (ref 5–45)
BASOPHILS # BLD AUTO: 0.03 THOUSANDS/ÂΜL (ref 0–0.1)
BASOPHILS NFR BLD AUTO: 1 % (ref 0–1)
BILIRUB SERPL-MCNC: 0.72 MG/DL (ref 0.2–1)
BUN SERPL-MCNC: 12 MG/DL (ref 5–25)
CALCIUM SERPL-MCNC: 9.6 MG/DL (ref 8.3–10.1)
CHLORIDE SERPL-SCNC: 107 MMOL/L (ref 96–108)
CO2 SERPL-SCNC: 25 MMOL/L (ref 21–32)
CREAT SERPL-MCNC: 1.11 MG/DL (ref 0.6–1.3)
EOSINOPHIL # BLD AUTO: 0.03 THOUSAND/ÂΜL (ref 0–0.61)
EOSINOPHIL NFR BLD AUTO: 1 % (ref 0–6)
ERYTHROCYTE [DISTWIDTH] IN BLOOD BY AUTOMATED COUNT: 14.8 % (ref 11.6–15.1)
GFR SERPL CREATININE-BSD FRML MDRD: 67 ML/MIN/1.73SQ M
GLUCOSE P FAST SERPL-MCNC: 97 MG/DL (ref 65–99)
HCT VFR BLD AUTO: 41.8 % (ref 36.5–49.3)
HGB BLD-MCNC: 13.2 G/DL (ref 12–17)
IMM GRANULOCYTES # BLD AUTO: 0.02 THOUSAND/UL (ref 0–0.2)
IMM GRANULOCYTES NFR BLD AUTO: 0 % (ref 0–2)
LYMPHOCYTES # BLD AUTO: 1.77 THOUSANDS/ÂΜL (ref 0.6–4.47)
LYMPHOCYTES NFR BLD AUTO: 27 % (ref 14–44)
MCH RBC QN AUTO: 28.9 PG (ref 26.8–34.3)
MCHC RBC AUTO-ENTMCNC: 31.6 G/DL (ref 31.4–37.4)
MCV RBC AUTO: 92 FL (ref 82–98)
MONOCYTES # BLD AUTO: 0.7 THOUSAND/ÂΜL (ref 0.17–1.22)
MONOCYTES NFR BLD AUTO: 11 % (ref 4–12)
NEUTROPHILS # BLD AUTO: 3.97 THOUSANDS/ÂΜL (ref 1.85–7.62)
NEUTS SEG NFR BLD AUTO: 60 % (ref 43–75)
NRBC BLD AUTO-RTO: 0 /100 WBCS
PLATELET # BLD AUTO: 333 THOUSANDS/UL (ref 149–390)
PMV BLD AUTO: 8.7 FL (ref 8.9–12.7)
POTASSIUM SERPL-SCNC: 3.8 MMOL/L (ref 3.5–5.3)
PROT SERPL-MCNC: 7.9 G/DL (ref 6.4–8.4)
RBC # BLD AUTO: 4.57 MILLION/UL (ref 3.88–5.62)
SODIUM SERPL-SCNC: 136 MMOL/L (ref 135–147)
TSH SERPL DL<=0.05 MIU/L-ACNC: 2.66 UIU/ML (ref 0.45–4.5)
WBC # BLD AUTO: 6.52 THOUSAND/UL (ref 4.31–10.16)

## 2023-06-01 PROCEDURE — 80053 COMPREHEN METABOLIC PANEL: CPT

## 2023-06-01 PROCEDURE — 84443 ASSAY THYROID STIM HORMONE: CPT

## 2023-06-01 PROCEDURE — 36415 COLL VENOUS BLD VENIPUNCTURE: CPT

## 2023-06-01 PROCEDURE — 85025 COMPLETE CBC W/AUTO DIFF WBC: CPT

## 2023-06-05 ENCOUNTER — TELEPHONE (OUTPATIENT)
Dept: FAMILY MEDICINE CLINIC | Facility: CLINIC | Age: 68
End: 2023-06-05

## 2023-06-05 NOTE — RESULT ENCOUNTER NOTE
Call patient with lab result-Labs look good  Anemia has corrected  Other mild abnormalities are now normal   TSH in good range

## 2023-06-05 NOTE — TELEPHONE ENCOUNTER
Patient wife aware    ----- Message from Any Giles MD sent at 6/5/2023 12:51 PM EDT -----  Call patient with lab result-Labs look good  Anemia has corrected  Other mild abnormalities are now normal   TSH in good range

## 2023-06-15 DIAGNOSIS — C61 PROSTATE CANCER (HCC): ICD-10-CM

## 2023-06-15 DIAGNOSIS — N52.9 ERECTILE DYSFUNCTION, UNSPECIFIED ERECTILE DYSFUNCTION TYPE: ICD-10-CM

## 2023-06-15 RX ORDER — SILDENAFIL 100 MG/1
100 TABLET, FILM COATED ORAL AS NEEDED
Qty: 30 TABLET | Refills: 3 | Status: SHIPPED | OUTPATIENT
Start: 2023-06-15

## 2023-06-15 NOTE — TELEPHONE ENCOUNTER
An Auto-fax Refill Request for Sildenafil 100mg was received from [facility]  The patient was last seen on 5/17/23 by Peter Su in the [facility] location; continuation of the medication was authorized at that time    Request for same, 30 tablets with 3 refills was queued and forwarded to the Advanced Practitioner covering the [facility] location for approval

## 2023-06-26 ENCOUNTER — TELEPHONE (OUTPATIENT)
Dept: RADIATION ONCOLOGY | Facility: CLINIC | Age: 68
End: 2023-06-26

## 2023-06-26 ENCOUNTER — TELEPHONE (OUTPATIENT)
Dept: INFUSION CENTER | Facility: CLINIC | Age: 68
End: 2023-06-26

## 2023-06-26 NOTE — TELEPHONE ENCOUNTER
Spoke with wife Tony Townsend, listed on communication consent    Advised additional bloodwork not needed for upcoming appointment with Dr Tatiana Holden as he had PSA level drawn on 5/17/23

## 2023-06-29 ENCOUNTER — RADIATION ONCOLOGY FOLLOW-UP (OUTPATIENT)
Dept: RADIATION ONCOLOGY | Facility: CLINIC | Age: 68
End: 2023-06-29
Attending: RADIOLOGY
Payer: MEDICARE

## 2023-06-29 VITALS
DIASTOLIC BLOOD PRESSURE: 85 MMHG | OXYGEN SATURATION: 97 % | BODY MASS INDEX: 24.34 KG/M2 | HEART RATE: 82 BPM | HEIGHT: 72 IN | RESPIRATION RATE: 18 BRPM | TEMPERATURE: 98.3 F | SYSTOLIC BLOOD PRESSURE: 128 MMHG | WEIGHT: 179.68 LBS

## 2023-06-29 DIAGNOSIS — C61 PROSTATE CANCER (HCC): Primary | ICD-10-CM

## 2023-06-29 PROCEDURE — 99211 OFF/OP EST MAY X REQ PHY/QHP: CPT | Performed by: RADIOLOGY

## 2023-06-29 RX ORDER — ALBUTEROL SULFATE 90 UG/1
2 AEROSOL, METERED RESPIRATORY (INHALATION) EVERY 6 HOURS PRN
COMMUNITY

## 2023-06-29 NOTE — PROGRESS NOTES
Follow-up - Radiation Oncology   [facility] 1955 75 y o  male [id number]      History of Present Illness   Cancer Staging   Prostate cancer [facility])  Staging form: Prostate, AJCC 8th Edition  - Clinical stage from 6/3/2021: Stage IIC (cT2a, cN0, cM0, PSA: 10 7, Grade Group: 3) - Signed by Sharifa Escalante MD on 6/3/2021  Histopathologic type: Adenocarcinoma, NOS  Prostate specific antigen (PSA) range: 10 to 19  Chapin primary pattern: 4  Rachel secondary pattern: 3  Rachel score: 7  Histologic grading system: 5 grade system  Location of positive needle core biopsies: Both sides  Stage used in treatment planning: Yes  National guidelines used in treatment planning: Yes      THE [facility] is a 75y o  year old male with a history of Chapin 7 (4+3) prostate cancer  Completed EBRT to prostate/proximal SV on 10/27/21  Last seen in radiation oncology on 22  Interval History:        Lab Results   Component Value Date     PSA 0 58 2023     PSA 0 6 2023     PSA 0 6 10/21/2022      1/26/23  Urology   Feeling well  Denies LUTS symptoms  PSA reviewed  F/U in 6 months     23  Urology  YOSHI is unremarkable  F/U in 6 months with PSA    Patient seen today with his wife  Reports he is feeling well  He has stable nocturia 0-3x with a weak stream at times  He is having no hematuria nor dysuria  He reports normal bowel movements without any diarrhea  He has no blood in his bowel movements other than last week when he had a hard bowel movement  He denies any hemorrhoids  He had a positive Cologuard test in 2022 and had a negative colonoscopy in 2022  He has some erectile dysfunction that is relieved with the use of Viagra 50 mg        Upcomin/15/23  Urology with PSA     Historical Information   Oncology History Overview Note   History of Rachel 7 (4+3) prostate cancer  Completed EBRT to prostate/proximal SV on 10/27/21    Last seen in radiation oncology on 22     Lab Results   Component Value Date    PSA 0 58 2023    PSA 0 6 2023    PSA 0 6 10/21/2022     1/26/23  Urology   Feeling well  Denies LUTS symptoms  PSA reviewed  F/U in 6 months    23  Urology  YOSHI is unremarkable  F/U in 6 months with PSA    Upcomin/15/23  Urology     Prostate cancer [facility])   2021 Initial Diagnosis    Prostate cancer ([facility] [address] )     2021 Biopsy    Final Diagnosis   A  Prostate, right lateral base:  - Prostatic adenocarcinoma, Rachel score 3 + 4 = 7, Prognostic Grade Group 2, discontinuously involving 7% of one core:  - see Note  * tumor is 10% grade 4   * periprostatic fat invasion:  not identified  * lymph-vascular invasion:  not identified  * perineural invasion:  not identified   - Additional pathologic findings:  N/A     B  Prostate, right lateral mid:  - Atypical small acinar proliferation in a single focus, < 5% of one core, suspicious for low grade prostatic adenocarcinoma - see Note  C  Prostate, right lateral apex x 2:  - Prostatic adenocarcinoma, Rachel score 4 + 3 = 7, Prognostic Grade Group 3, discontinuously involving 23% of one of two cores:  - see Note  * tumor is 60% grade 4, with focal cribriform morphology   * periprostatic fat invasion:  not identified  * lymph-vascular invasion:  not identified  * perineural invasion:  not identified   - Additional pathologic findings:  N/A     D  Prostate, left lateral base:  - Prostatic adenocarcinoma, Rachel score 4 + 3 = 7, Prognostic Grade Group 3, discontinuously involving 35% of one core:  - see Note  * tumor is 70% grade 4, with focal cribriform morphology   * periprostatic fat invasion:  not identified  * lymph-vascular invasion:  not identified  * perineural invasion:  not identified   - Additional pathologic findings:  N/A     Note: Block D1 is suggested if additional studies are indicated (at least 0 5 mm of tumor for Prolaris)      E   Prostate, left lateral mid:  - Atypical small acinar proliferation in a single focus, < 5% of one core, suspicious for low grade prostatic adenocarcinoma - see Note  F  Prostate, left lateral apex:  - Benign prostate tissue  G  Prostate, left base:  - Prostatic adenocarcinoma, [place] score 3 + 4 = 7, Prognostic Grade Group 2, continuously involving 5% of one core:   * tumor is 20% grade 4   * periprostatic fat invasion:  not identified  * lymph-vascular invasion:  not identified  * perineural invasion:  focally present  - Additional pathologic findings:  N/A     H  Prostate, left mid:  - High grade prostatic intraepithelial neoplasia - see Note        I  Prostate, left apex:  - Benign prostate tissue  J  Prostate, right base:  - Benign prostate tissue  K  Prostate, right mid:  - Prostatic adenocarcinoma, [place] score 4 + 3 = 7, Prognostic Grade Group 3, continuously involving 19% of one core:   * tumor is 90% grade 4 and displays focal cribriform morphology   * periprostatic fat invasion:  not identified  * lymph-vascular invasion:  not identified  * perineural invasion:  not identified   - Additional pathologic findings:  N/A     L  Prostate, right apex:  - Prostatic adenocarcinoma, [place] score 3 + 4 = 7, Prognostic Grade Group 2, discontinuously involving 19% of one core - see Note  * tumor is 40% grade 4  * periprostatic fat invasion:  not identified  * lymph-vascular invasion:  not identified  * perineural invasion:  focally present  - Additional pathologic findings:  N/A        6/3/2021 -  Cancer Staged    Staging form: Prostate, AJCC 8th Edition  - Clinical stage from 6/3/2021: Stage IIC (cT2a, cN0, cM0, PSA: 10 7, Grade Group: 3) - Signed by Richa Conrad MD on 6/3/2021  Histopathologic type:  Adenocarcinoma, NOS  Prostate specific antigen (PSA) range: 10 to 19  Rachel primary pattern: 4  [place] secondary pattern: 3  [place] score: 7  Histologic grading system: 5 grade system  Location of positive needle core biopsies: Both sides  Stage used in treatment planning: Yes  National guidelines used in treatment planning: Yes       8/23/2021 - 10/27/2021 Radiation    Prost ProxSV 6X 40 / 40 180 0 7,920 65      Treatment dates:  C1: 8/23/2021 - 10/27/2021       Past Medical History:   Diagnosis Date   • Allergic May 1 2022    [place]   • Arthritis     both knees   • Hypertension 10/01/20    Dr Cassandra Manning   • Pneumonia    • Prostate cancer [facility])      Past Surgical History:   Procedure Laterality Date   • CARPAL TUNNEL RELEASE  2015    Both wrists   • LA PLMT INTERSTITIAL DEV RADIAT TX PROSTATE 1/MULT N/A 7/29/2021    Procedure: INSERTION OF FIDUCIAL MARKER (TRANSRECTAL ULTRASOUND GUIDANCE), SPACEOAR;  Surgeon: Silvia Castro MD;  Location: [facility];  Service: Urology   • TONSILLECTOMY         Social History   Social History     Substance and Sexual Activity   Alcohol Use Yes   • Alcohol/week: 28 0 standard drinks of alcohol   • Types: 28 Shots of liquor per week    Comment: drinks fifth of alcohol during week     Social History     Substance and Sexual Activity   Drug Use Not Currently   • Types: Marijuana    Comment: daily     Social History     Tobacco Use   Smoking Status Every Day   • Packs/day: 1 00   • Years: 40 00   • Total pack years: 40 00   • Types: Cigarettes   • Start date: 4/27/1980   Smokeless Tobacco Never   Tobacco Comments    Pt refused smoking cessation     Meds/Allergies     Current Outpatient Medications:   •  albuterol (PROVENTIL HFA,VENTOLIN HFA) 90 mcg/act inhaler, Inhale 2 puffs every 6 (six) hours as needed for wheezing, Disp: , Rfl:   •  amLODIPine (NORVASC) 5 mg tablet, Take 1 tablet (5 mg total) by mouth daily, Disp: 30 tablet, Rfl: 5  •  Diclofenac Sodium (VOLTAREN) 1 %, Apply 2 g topically 4 (four) times a day for 7 days, Disp: 56 g, Rfl: 0  •  Misc Natural Products (Osteo Bi-Flex Joint Shield) TABS, Take by mouth in the morning, Disp: , Rfl:   •  Multiple Vitamins-Minerals (multivitamin with minerals) tablet, Take 1 tablet by mouth daily, Disp: , Rfl:   •  Naproxen Sodium (ALEVE PO), Take by mouth 2 (two) times a day, Disp: , Rfl:   •  Omega-3 Fatty Acids (Omega-3 Fish Oil) 1000 MG CAPS, Take by mouth in the morning, Disp: , Rfl:   •  selenium 50 MCG TABS, Take 50 mcg by mouth daily, Disp: , Rfl:   •  sildenafil (VIAGRA) 100 mg tablet, Take 1 tablet (100 mg total) by mouth if needed for erectile dysfunction Take on empty stomach, 1 hour prior to sexual activity, no alcohol  100 mg max dose, Disp: 30 tablet, Rfl: 3  •  methylPREDNISolone 4 MG tablet therapy pack, Use as directed on package (Patient not taking: Reported on 6/29/2023), Disp: 21 each, Rfl: 0  No Known Allergies    Review of Systems   Constitutional: Positive for appetite change (appetite improving) and unexpected weight change (gaining weight)  Negative for activity change, chills, fatigue and fever  HENT: Positive for congestion (uses inhaler) and sneezing (seasonal allergies)  Eyes: Negative  Negative for visual disturbance  Wears glasses   Respiratory: Positive for cough (moisat productive cough with clear phlegm)  Negative for shortness of breath  Cardiovascular: Negative  Negative for chest pain and leg swelling  Gastrointestinal: Positive for blood in stool  Negative for abdominal pain, constipation, diarrhea, nausea and vomiting  Endocrine: Negative for cold intolerance and heat intolerance  Genitourinary: Positive for frequency and urgency  Negative for dysuria and hematuria  Nocturia x0-3   Musculoskeletal: Positive for arthralgias (bilateral wrist and knee pain, needs knee replacement)  Skin: Negative  Allergic/Immunologic: Positive for environmental allergies  Neurological: Negative  Negative for dizziness, weakness, light-headedness, numbness and headaches  Hematological: Negative  Does not bruise/bleed easily     Psychiatric/Behavioral: Negative for sleep disturbance       Clinical Trial: no     IPSS Questionnaire (AUA-7): Over the past month…     1)  How often have you had a sensation of not emptying your bladder completely after you finish urinating? 1 - Less than 1 time in 5   2)  How often have you had to urinate again less than two hours after you finished urinating? 5 - Almost always   3)  How often have you found you stopped and started again several times when you urinated? 0 - Not at all   4) How difficult have you found it to postpone urination? 3 - About half the time   5) How often have you had a weak urinary stream?  0 - Not at all   6) How often have you had to push or strain to begin urination? 0 - Not at all   7) How many times did you most typically get up to urinate from the time you went to bed until the time you got up in the morning? 3 - 3 times   Total Score:  12     OBJECTIVE:   /85 (BP Location: Left arm, Patient Position: Sitting, Cuff Size: Standard)   Pulse 82   Temp 98 3 °F (36 8 °C) (Temporal)   Resp 18   Ht 6' (1 829 m)   Wt 81 5 kg (179 lb 10 8 oz)   SpO2 97%   BMI 24 37 kg/m²   Pain Assessment:  0  ECOG/Zubrod/WHO: 1 - Symptomatic but completely ambulatory    Physical Exam   Vitals and nursing note reviewed  Constitutional:       General: He is not in acute distress  Appearance: He is well-developed  He is not diaphoretic  HENT:      Head: Normocephalic and atraumatic  Mouth/Throat:      Pharynx: No oropharyngeal exudate  Eyes:      General: No scleral icterus  Conjunctiva/sclera: Conjunctivae normal       Pupils: Pupils are equal, round, and reactive to light  Neck:      Thyroid: No thyromegaly  Trachea: No tracheal deviation  Cardiovascular:      Rate and Rhythm: Normal rate and regular rhythm  Heart sounds: Normal heart sounds  Pulmonary:      Effort: Pulmonary effort is normal  No respiratory distress  Breath sounds: Normal breath sounds  No stridor  No wheezing, rhonchi or rales     Chest:      Chest wall: No tenderness  Abdominal:      General: Bowel sounds are normal  There is no distension  Palpations: Abdomen is soft  There is no mass  Tenderness: There is no abdominal tenderness  Genitourinary:     Comments: Rectal exam differed  Musculoskeletal:         General: No swelling or tenderness  Normal range of motion  Cervical back: Normal range of motion and neck supple  Lymphadenopathy:      Cervical: No cervical adenopathy  Skin:     General: Skin is warm and dry  Coloration: Skin is not jaundiced or pale  Findings: No erythema or rash  Neurological:      General: No focal deficit present  Mental Status: He is alert and oriented to person, place, and time  Cranial Nerves: No cranial nerve deficit  Coordination: Coordination normal    Psychiatric:         Mood and Affect: Mood normal          Behavior: Behavior normal          Thought Content:  Thought content normal          Judgment: Judgment normal      RESULTS    Lab Results:   Recent Results (from the past 672 hour(s))   CBC and differential    Collection Time: 06/01/23 11:46 AM   Result Value Ref Range    WBC 6 52 4 31 - 10 16 Thousand/uL    RBC 4 57 3 88 - 5 62 Million/uL    Hemoglobin 13 2 12 0 - 17 0 g/dL    Hematocrit 41 8 36 5 - 49 3 %    MCV 92 82 - 98 fL    MCH 28 9 26 8 - 34 3 pg    MCHC 31 6 31 4 - 37 4 g/dL    RDW 14 8 11 6 - 15 1 %    MPV 8 7 (L) 8 9 - 12 7 fL    Platelets 469 793 - 321 Thousands/uL    nRBC 0 /100 WBCs    Neutrophils Relative 60 43 - 75 %    Immat GRANS % 0 0 - 2 %    Lymphocytes Relative 27 14 - 44 %    Monocytes Relative 11 4 - 12 %    Eosinophils Relative 1 0 - 6 %    Basophils Relative 1 0 - 1 %    Neutrophils Absolute 3 97 1 85 - 7 62 Thousands/µL    Immature Grans Absolute 0 02 0 00 - 0 20 Thousand/uL    Lymphocytes Absolute 1 77 0 60 - 4 47 Thousands/µL    Monocytes Absolute 0 70 0 17 - 1 22 Thousand/µL    Eosinophils Absolute 0 03 0 00 - 0 61 Thousand/µL    Basophils Absolute 0 03 0 00 - 0 10 Thousands/µL   Comprehensive metabolic panel    Collection Time: 06/01/23 11:46 AM   Result Value Ref Range    Sodium 136 135 - 147 mmol/L    Potassium 3 8 3 5 - 5 3 mmol/L    Chloride 107 96 - 108 mmol/L    CO2 25 21 - 32 mmol/L    ANION GAP 4 4 - 13 mmol/L    BUN 12 5 - 25 mg/dL    Creatinine 1 11 0 60 - 1 30 mg/dL    Glucose, Fasting 97 65 - 99 mg/dL    Calcium 9 6 8 3 - 10 1 mg/dL    AST 18 5 - 45 U/L    ALT 19 12 - 78 U/L    Alkaline Phosphatase 50 46 - 116 U/L    Total Protein 7 9 6 4 - 8 4 g/dL    Albumin 3 9 3 5 - 5 0 g/dL    Total Bilirubin 0 72 0 20 - 1 00 mg/dL    eGFR 67 ml/min/1 73sq m   TSH, 3rd generation    Collection Time: 06/01/23 11:46 AM   Result Value Ref Range    TSH 3RD GENERATON 2 660 0 450 - 4 500 uIU/mL       Imaging Studies:No results found  Assessment/Plan:  No orders of the defined types were placed in this encounter         Judith Longo is a 75y o  year old male with a stage IIC Rachel score 4+3=7 prostate adenocarcinoma diagnosed in the elevated PSA 10 7 NG/mL  [facility] had his 1st prostate biopsies April 28, 2021 that were positive in 6/12 cores bilaterally for Durand score 4+3=7 disease in 3 cores with the other 3 cores showing 3+4=7 disease   His bone scan was negative for any evidence of metastatic disease and CT scan of the abdomen pelvis was also negative for evidence of metastatic disease   We discussed treatment options to include prostatectomy versus definitive external beam radiation therapy as well as observation  Manual Peto not comfortable with observation and also did not wish to pursue surgery   We discussed definitive radiation therapy alone or definitive radiation therapy with concurrent androgen deprivation therapy  [facility] declined the androgen deprivation therapy and wanted to be treated with radiation therapy alone   He had placement of fiducial markers for image guided radiation therapy as well as the placement of a space-oar to reduce dose "to the rectum  Anyi Solomon completed treatment with intensity modulated radiation therapy using RapidArc technology as well as the image guided radiation therapy in the [address] [place] [address] 0n October 27, 2021      He is seen for follow-up today with his wife  He reports he is feeling well  He has stable nocturia with an occasional weak stream   He has normal bowel movements  He had a positive Cologuard test followed by a negative colonoscopy in September 2022  PSA level had decreased down to 1 0 on April 21, 2022 and again on Payton 3, 2022  PSA level decreased further to 0 6 NG/mL October 21, 2022 and January 25, 2023  He is most recent PSA level May 17, 2023 is now 0 58 NG/mL  Results were reviewed today with the patient and his wife  He is doing well and has no evidence of any disease  He has urology follow-up scheduled for November 15, 2023 along with a repeat PSA level  Since he remains without any evidence of recurrent disease now 2-1/2 years post completion of treatment with his lowest PSA level of 0 58 NG/mL, he will be discharged from follow-up in this office  He will continue to follow-up twice including a PSA level with urology  Wing Gonzalez MD  6/42/4954,53:46 AM    Portions of the record may have been created with voice recognition software   Occasional wrong word or \"sound a like\" substitutions may have occurred due to the inherent limitations of voice recognition software   Read the chart carefully and recognize, using context, where substitutions have occurred        "

## 2023-06-29 NOTE — PROGRESS NOTES
Ashley Cotto 1955 is a 75 y o  male History of [facility] 7 (4+3) prostate cancer  Completed EBRT to prostate/proximal SV on 10/27/21  Last seen in radiation oncology on 22  Lab Results   Component Value Date    PSA 0 58 2023    PSA 0 6 2023    PSA 0 6 10/21/2022     1/26/23  Urology   Feeling well  Denies LUTS symptoms  PSA reviewed  F/U in 6 months    23  Urology  YOSHI is unremarkable  F/U in 6 months with PSA    Upcomin/15/23  Urology        Oncology History Overview Note   History of [facility] 7 (4+3) prostate cancer  Completed EBRT to prostate/proximal SV on 10/27/21  Last seen in radiation oncology on 22     Lab Results   Component Value Date    PSA 0 58 2023    PSA 0 6 2023    PSA 0 6 10/21/2022     1/26/23  Urology   Feeling well  Denies LUTS symptoms  PSA reviewed  F/U in 6 months    23  Urology  YOSHI is unremarkable  F/U in 6 months with PSA    Upcomin/15/23  Urology     Prostate cancer [facility])   2021 Initial Diagnosis    Prostate cancer ([facility] [address] )     2021 Biopsy    Final Diagnosis   A  Prostate, right lateral base:  - Prostatic adenocarcinoma, Rachel score 3 + 4 = 7, Prognostic Grade Group 2, discontinuously involving 7% of one core:  - see Note  * tumor is 10% grade 4   * periprostatic fat invasion:  not identified  * lymph-vascular invasion:  not identified  * perineural invasion:  not identified   - Additional pathologic findings:  N/A     B  Prostate, right lateral mid:  - Atypical small acinar proliferation in a single focus, < 5% of one core, suspicious for low grade prostatic adenocarcinoma - see Note  C  Prostate, right lateral apex x 2:  - Prostatic adenocarcinoma, [facility] score 4 + 3 = 7, Prognostic Grade Group 3, discontinuously involving 23% of one of two cores:  - see Note  * tumor is 60% grade 4, with focal cribriform morphology   * periprostatic fat invasion:  not identified     * lymph-vascular invasion:  not identified  * perineural invasion:  not identified   - Additional pathologic findings:  N/A     D  Prostate, left lateral base:  - Prostatic adenocarcinoma, [place] score 4 + 3 = 7, Prognostic Grade Group 3, discontinuously involving 35% of one core:  - see Note  * tumor is 70% grade 4, with focal cribriform morphology   * periprostatic fat invasion:  not identified  * lymph-vascular invasion:  not identified  * perineural invasion:  not identified   - Additional pathologic findings:  N/A     Note: Block D1 is suggested if additional studies are indicated (at least 0 5 mm of tumor for Prolaris)      E  Prostate, left lateral mid:  - Atypical small acinar proliferation in a single focus, < 5% of one core, suspicious for low grade prostatic adenocarcinoma - see Note  F  Prostate, left lateral apex:  - Benign prostate tissue  G  Prostate, left base:  - Prostatic adenocarcinoma, Rachel score 3 + 4 = 7, Prognostic Grade Group 2, continuously involving 5% of one core:   * tumor is 20% grade 4   * periprostatic fat invasion:  not identified  * lymph-vascular invasion:  not identified  * perineural invasion:  focally present  - Additional pathologic findings:  N/A     H  Prostate, left mid:  - High grade prostatic intraepithelial neoplasia - see Note        I  Prostate, left apex:  - Benign prostate tissue  J  Prostate, right base:  - Benign prostate tissue  K  Prostate, right mid:  - Prostatic adenocarcinoma, [place] score 4 + 3 = 7, Prognostic Grade Group 3, continuously involving 19% of one core:   * tumor is 90% grade 4 and displays focal cribriform morphology   * periprostatic fat invasion:  not identified  * lymph-vascular invasion:  not identified  * perineural invasion:  not identified   - Additional pathologic findings:  N/A     L   Prostate, right apex:  - Prostatic adenocarcinoma, [place] score 3 + 4 = 7, Prognostic Grade Group 2, discontinuously involving 19% of one core - see Note  * tumor is 40% grade 4  * periprostatic fat invasion:  not identified  * lymph-vascular invasion:  not identified  * perineural invasion:  focally present  - Additional pathologic findings:  N/A        6/3/2021 -  Cancer Staged    Staging form: Prostate, AJCC 8th Edition  - Clinical stage from 6/3/2021: Stage IIC (cT2a, cN0, cM0, PSA: 10 7, Grade Group: 3) - Signed by Ish Del Rio MD on 6/3/2021  Histopathologic type: Adenocarcinoma, NOS  Prostate specific antigen (PSA) range: 10 to 19  Rachel primary pattern: 4  Rachel secondary pattern: 3  Rachel score: 7  Histologic grading system: 5 grade system  Location of positive needle core biopsies: Both sides  Stage used in treatment planning: Yes  National guidelines used in treatment planning: Yes       8/23/2021 - 10/27/2021 Radiation    Prost ProxSV 6X 44 / 44 180 0 7,920 65      Treatment dates:  C1: 8/23/2021 - 10/27/2021         Review of Systems:  Review of Systems   Constitutional: Positive for appetite change (appetite improving) and unexpected weight change (gaining weight)  Negative for activity change, chills, fatigue and fever  HENT: Positive for congestion (uses inhaler) and sneezing (seasonal allergies)  Eyes: Negative  Negative for visual disturbance  Wears glasses   Respiratory: Positive for cough (moisat productive cough with clear phlegm)  Negative for shortness of breath  Cardiovascular: Negative  Negative for chest pain and leg swelling  Gastrointestinal: Positive for blood in stool  Negative for abdominal pain, constipation, diarrhea, nausea and vomiting  Endocrine: Negative for cold intolerance and heat intolerance  Genitourinary: Positive for frequency and urgency  Negative for dysuria and hematuria  Nocturia x0-3   Musculoskeletal: Positive for arthralgias (bilateral wrist and knee pain, needs knee replacement)  Skin: Negative      Allergic/Immunologic: Positive for environmental allergies  Neurological: Negative  Negative for dizziness, weakness, light-headedness, numbness and headaches  Hematological: Negative  Does not bruise/bleed easily  Psychiatric/Behavioral: Negative for sleep disturbance  Clinical Trial: no    IPSS Questionnaire (AUA-7): Over the past month…    1)  How often have you had a sensation of not emptying your bladder completely after you finish urinating? 1 - Less than 1 time in 5   2)  How often have you had to urinate again less than two hours after you finished urinating? 5 - Almost always   3)  How often have you found you stopped and started again several times when you urinated? 0 - Not at all   4) How difficult have you found it to postpone urination? 3 - About half the time   5) How often have you had a weak urinary stream?  0 - Not at all   6) How often have you had to push or strain to begin urination? 0 - Not at all   7) How many times did you most typically get up to urinate from the time you went to bed until the time you got up in the morning?   3 - 3 times   Total Score:  12       Teaching completed    Health Maintenance   Topic Date Due   • COVID-19 Vaccine (5 - Booster for Pfizer series) 12/26/2022   • Colorectal Cancer Screening  09/09/2023   • Lung Cancer Screening  09/13/2023   • Fall Risk  05/26/2024   • Medicare Annual Wellness Visit (AWV)  05/26/2024   • Depression Screening  06/29/2024   • BMI: Adult  06/29/2024   • Hepatitis C Screening  Completed   • Pneumococcal Vaccine: 65+ Years  Completed   • Influenza Vaccine  Completed   • HIB Vaccine  Aged Out   • IPV Vaccine  Aged Out   • Hepatitis A Vaccine  Aged Out   • Meningococcal ACWY Vaccine  Aged Out   • HPV Vaccine  Aged Out     Patient Active Problem List   Diagnosis   • Essential hypertension   • Cigarette nicotine dependence without complication   • Elevated PSA   • Prostate cancer ([facility] [address] )   • Smoking   • Primary osteoarthritis of both knees   • Hyponatremia   • Transaminitis • Unintentional weight loss   • Hypoxemia   • Right wrist pain   • Slac (scapholunate advanced collapse) of wrist, right   • Slac (scapholunate advanced collapse) of wrist, left   • Colon adenomas     Past Medical History:   Diagnosis Date   • Allergic May 1 2022    Hayvever   • Arthritis     both knees   • Hypertension 10/01/20    Dr Brigido Mead   • Pneumonia    • Prostate cancer [facility])      Past Surgical History:   Procedure Laterality Date   • CARPAL TUNNEL RELEASE  2015    Both wrists   • [place] PLMT INTERSTITIAL DEV RADIAT TX PROSTATE 1/MULT N/A 7/29/2021    Procedure: INSERTION OF FIDUCIAL MARKER (TRANSRECTAL ULTRASOUND GUIDANCE), SPACEOAR;  Surgeon: Candy Sr MD;  [facility];  Service: Urology   • TONSILLECTOMY       Family History   Problem Relation Age of Onset   • Breast cancer Mother    • Substance Abuse Neg Hx    • Mental illness Neg Hx    • Colon cancer Neg Hx    • Colon polyps Neg Hx      Social History     Socioeconomic History   • Marital status: /Civil Union     Spouse name: Not on file   • Number of children: Not on file   • Years of education: Not on file   • Highest education level: Not on file   Occupational History   • Not on file   Tobacco Use   • Smoking status: Every Day     Packs/day: 1 00     Years: 40 00     Total pack years: 40 00     Types: Cigarettes     Start date: 4/27/1980   • Smokeless tobacco: Never   • Tobacco comments:     Pt refused smoking cessation   Vaping Use   • Vaping Use: Never used   Substance and Sexual Activity   • Alcohol use:  Yes     Alcohol/week: 28 0 standard drinks of alcohol     Types: 28 Shots of liquor per week     Comment: drinks fifth of alcohol during week   • Drug use: Not Currently     Types: Marijuana     Comment: daily   • Sexual activity: Yes     Partners: Female     Birth control/protection: None   Other Topics Concern   • Not on file   Social History Narrative   • Not on file     Social Determinants of Health     Financial Resource Strain: Low Risk  (5/19/2023)    Overall Financial Resource Strain (CARDIA)    • Difficulty of Paying Living Expenses: Not very hard   Food Insecurity: No Food Insecurity (6/15/2022)    Hunger Vital Sign    • Worried About Running Out of Food in the Last Year: Never true    • Ran Out of Food in the Last Year: Never true   Transportation Needs: No Transportation Needs (5/19/2023)    PRAPARE - Transportation    • Lack of Transportation (Medical): No    • Lack of Transportation (Non-Medical): No   Physical Activity: Not on file   Stress: Not on file   Social Connections: Not on file   Intimate Partner Violence: Not on file   Housing Stability: Low Risk  (6/15/2022)    Housing Stability Vital Sign    • Unable to Pay for Housing in the Last Year: No    • Number of Places Lived in the Last Year: 1    • Unstable Housing in the Last Year: No       Current Outpatient Medications:   •  albuterol (PROVENTIL HFA,VENTOLIN HFA) 90 mcg/act inhaler, Inhale 2 puffs every 6 (six) hours as needed for wheezing, Disp: , Rfl:   •  amLODIPine (NORVASC) 5 mg tablet, Take 1 tablet (5 mg total) by mouth daily, Disp: 30 tablet, Rfl: 5  •  Diclofenac Sodium (VOLTAREN) 1 %, Apply 2 g topically 4 (four) times a day for 7 days, Disp: 56 g, Rfl: 0  •  Misc Natural Products (Osteo Bi-Flex Joint Shield) TABS, Take by mouth in the morning, Disp: , Rfl:   •  Multiple Vitamins-Minerals (multivitamin with minerals) tablet, Take 1 tablet by mouth daily, Disp: , Rfl:   •  Naproxen Sodium (ALEVE PO), Take by mouth 2 (two) times a day, Disp: , Rfl:   •  Omega-3 Fatty Acids (Omega-3 Fish Oil) 1000 MG CAPS, Take by mouth in the morning, Disp: , Rfl:   •  selenium 50 MCG TABS, Take 50 mcg by mouth daily, Disp: , Rfl:   •  sildenafil (VIAGRA) 100 mg tablet, Take 1 tablet (100 mg total) by mouth if needed for erectile dysfunction Take on empty stomach, 1 hour prior to sexual activity, no alcohol   100 mg max dose, Disp: 30 tablet, Rfl: 3  •  methylPREDNISolone 4 MG tablet therapy pack, Use as directed on package (Patient not taking: Reported on 6/29/2023), Disp: 21 each, Rfl: 0  No Known Allergies  Vitals:    06/29/23 1017   BP: 128/85   BP Location: Left arm   Patient Position: Sitting   Cuff Size: Standard   Pulse: 82   Resp: 18   Temp: 98 3 °F (36 8 °C)   TempSrc: Temporal   SpO2: 97%   Weight: 81 5 kg (179 lb 10 8 oz)   Height: 6' (1 829 m)      Pain Score: 0-No pain

## 2023-07-03 ENCOUNTER — OFFICE VISIT (OUTPATIENT)
Dept: OBGYN CLINIC | Facility: CLINIC | Age: 68
End: 2023-07-03
Payer: MEDICARE

## 2023-07-03 VITALS
HEIGHT: 72 IN | SYSTOLIC BLOOD PRESSURE: 118 MMHG | DIASTOLIC BLOOD PRESSURE: 80 MMHG | BODY MASS INDEX: 23.57 KG/M2 | WEIGHT: 174 LBS

## 2023-07-03 DIAGNOSIS — M19.131 SLAC (SCAPHOLUNATE ADVANCED COLLAPSE) OF WRIST, RIGHT: Primary | ICD-10-CM

## 2023-07-03 DIAGNOSIS — M19.132 SLAC (SCAPHOLUNATE ADVANCED COLLAPSE) OF WRIST, LEFT: ICD-10-CM

## 2023-07-03 PROCEDURE — 99214 OFFICE O/P EST MOD 30 MIN: CPT | Performed by: PHYSICIAN ASSISTANT

## 2023-07-03 PROCEDURE — 20605 DRAIN/INJ JOINT/BURSA W/O US: CPT | Performed by: PHYSICIAN ASSISTANT

## 2023-07-03 RX ORDER — TRIAMCINOLONE ACETONIDE 40 MG/ML
40 INJECTION, SUSPENSION INTRA-ARTICULAR; INTRAMUSCULAR
Status: COMPLETED | OUTPATIENT
Start: 2023-07-03 | End: 2023-07-03

## 2023-07-03 RX ORDER — LIDOCAINE HYDROCHLORIDE 10 MG/ML
1 INJECTION, SOLUTION INFILTRATION; PERINEURAL
Status: COMPLETED | OUTPATIENT
Start: 2023-07-03 | End: 2023-07-03

## 2023-07-03 RX ADMIN — TRIAMCINOLONE ACETONIDE 40 MG: 40 INJECTION, SUSPENSION INTRA-ARTICULAR; INTRAMUSCULAR at 14:45

## 2023-07-03 RX ADMIN — LIDOCAINE HYDROCHLORIDE 1 ML: 10 INJECTION, SOLUTION INFILTRATION; PERINEURAL at 14:45

## 2023-07-03 NOTE — PROGRESS NOTES
ASSESSMENT/PLAN:    Assessment:   Bilateral SLAC wrist arthritis    Plan:   Patient was given bilateral cortisone injections with Kenalog for or SLAC wrist.  He tolerated well. He was advised that we can repeat the cortisone injections every 3 to 4 months as needed for pain  He is a smoker and would require him to quit if we were to discuss a 4 corner fusion  He will follow-up in 3 months for reevaluation    Follow Up:  3 months    To Do Next Visit:  Reevaluation    General Discussions:     Osteoarthritis:  The anatomy and physiology of osteoarthritis was discussed with the patient today in the office. Deterioration of the articular cartilage eventually leads to altered mobility at the joint, resulting in joint subluxation, osteophyte formation, cystic changes, as well as subchondral sclerosis. Eventually, pain, limited mobility, and compensatory hypermobility at surrounding joints may develop. While normal activity and usage of the joint may provide a painful experience to the patient, this typically does not result in damage to the limb. Treatment options include splints to decreased joint edema, pain, and inflammation. Therapy exercises to strengthen the surrounding musculature may relieve pain, but do not alter the overall continued development of osteoarthritis. Oral medications, topical medications, corticosteroid injections may decrease pain and increase overall function. Eventually, some patients may require surgical intervention. _____________________________________________________  CHIEF COMPLAINT:  Chief Complaint   Patient presents with   • Left Wrist - Follow-up     SLAC wrist   • Right Wrist - Follow-up     SLAC wrist          SUBJECTIVE:  Oziel Flannery is a 75 y.o. male who presents for follow-up regarding bilateral SLAC wrist arthritis. He previously had injections with betamethasone which provided minimal relief.   He was given a Medrol Dosepak at his last appointment as it was too early to repeat the injections. He states that the steroids seem to provide some relief but it was only temporary. He notes pain in both of his wrist with range of motion. He also notes some grinding as he is moving his wrist around. He denies any numbness or tingling. PAST MEDICAL HISTORY:  Past Medical History:   Diagnosis Date   • Allergic May 1 2022    Hayvever   • Arthritis     both knees   • Hypertension 10/01/20    Dr. Jiles Shone   • Pneumonia    • Prostate cancer [facility])        PAST SURGICAL HISTORY:  Past Surgical History:   Procedure Laterality Date   • CARPAL TUNNEL RELEASE  2015    Both wrists   • [place] PLMT INTERSTITIAL DEV RADIAT TX PROSTATE 1/MULT N/A 7/29/2021    Procedure: INSERTION OF FIDUCIAL MARKER (TRANSRECTAL ULTRASOUND GUIDANCE), SPACEOAR;  Surgeon: Gillermo Bloch, MD;  [facility];  Service: Urology   • TONSILLECTOMY         FAMILY HISTORY:  Family History   Problem Relation Age of Onset   • Breast cancer Mother    • Substance Abuse Neg Hx    • Mental illness Neg Hx    • Colon cancer Neg Hx    • Colon polyps Neg Hx        SOCIAL HISTORY:  Social History     Tobacco Use   • Smoking status: Every Day     Packs/day: 1.00     Years: 40.00     Total pack years: 40.00     Types: Cigarettes     Start date: 4/27/1980   • Smokeless tobacco: Never   • Tobacco comments:     Pt refused smoking cessation   Vaping Use   • Vaping Use: Never used   Substance Use Topics   • Alcohol use:  Yes     Alcohol/week: 28.0 standard drinks of alcohol     Types: 28 Shots of liquor per week     Comment: drinks fifth of alcohol during week   • Drug use: Not Currently     Types: Marijuana     Comment: daily       MEDICATIONS:    Current Outpatient Medications:   •  albuterol (PROVENTIL HFA,VENTOLIN HFA) 90 mcg/act inhaler, Inhale 2 puffs every 6 (six) hours as needed for wheezing, Disp: , Rfl:   •  amLODIPine (NORVASC) 5 mg tablet, Take 1 tablet (5 mg total) by mouth daily, Disp: 30 tablet, Rfl: 5  •  Diclofenac Sodium (VOLTAREN) 1 %, Apply 2 g topically 4 (four) times a day for 7 days, Disp: 56 g, Rfl: 0  •  methylPREDNISolone 4 MG tablet therapy pack, Use as directed on package (Patient not taking: Reported on 6/29/2023), Disp: 21 each, Rfl: 0  •  Misc Natural Products (Osteo Bi-Flex Joint Shield) TABS, Take by mouth in the morning, Disp: , Rfl:   •  Multiple Vitamins-Minerals (multivitamin with minerals) tablet, Take 1 tablet by mouth daily, Disp: , Rfl:   •  Naproxen Sodium (ALEVE PO), Take by mouth 2 (two) times a day, Disp: , Rfl:   •  Omega-3 Fatty Acids (Omega-3 Fish Oil) 1000 MG CAPS, Take by mouth in the morning, Disp: , Rfl:   •  selenium 50 MCG TABS, Take 50 mcg by mouth daily, Disp: , Rfl:   •  sildenafil (VIAGRA) 100 mg tablet, Take 1 tablet (100 mg total) by mouth if needed for erectile dysfunction Take on empty stomach, 1 hour prior to sexual activity, no alcohol. 100 mg max dose, Disp: 30 tablet, Rfl: 3    ALLERGIES:  No Known Allergies    REVIEW OF SYSTEMS:  Pertinent items are noted in HPI. A comprehensive review of systems was negative.     LABS:  HgA1c:   Lab Results   Component Value Date    HGBA1C 5.7 (H) 05/14/2014     BMP:   Lab Results   Component Value Date    GLUCOSE 103 05/14/2014    CALCIUM 9.6 06/01/2023     05/14/2014    K 3.8 06/01/2023    CO2 25 06/01/2023     06/01/2023    BUN 12 06/01/2023    CREATININE 1.11 06/01/2023       _____________________________________________________  PHYSICAL EXAMINATION:  Vital signs: /80   Ht 6' (1.829 m)   Wt 78.9 kg (174 lb)   BMI 23.60 kg/m²   General: well developed and well nourished, alert, oriented times 3 and appears comfortable  Psychiatric: Normal  HEENT: Trachea Midline, No torticollis  Cardiovascular: No discernable arrhythmia  Pulmonary: No wheezing or stridor  Abdomen: No rebound or guarding  Extremities: No peripheral edema  Skin: No masses, erythema, lacerations, fluctation, ulcerations  Neurovascular: Sensation Intact to the Median, Ulnar, Radial Nerve, Motor Intact to the Median, Ulnar, Radial Nerve and Pulses Intact    MUSCULOSKELETAL EXAMINATION:  Bilateral wrist  No erythema edema or ecchymosis noted, skin is warm to touch  Radiocarpal joint effusion appreciated on the right, minimal on the left. Radiocarpal joint tenderness is appreciated  Extension 30 degrees bilaterally, flexion 45 degrees bilaterally  He is able to make a full composite fist  Crepitation appreciated with wrist circumduction  2+ radial pulse  Capillary refill less than 2 seconds    _____________________________________________________  STUDIES REVIEWED:  No Studies to review      PROCEDURES PERFORMED:  Medium joint arthrocentesis: bilateral radiocarpal  Universal Protocol:  Consent: Verbal consent obtained.   Risks and benefits: risks, benefits and alternatives were discussed  Consent given by: patient  Patient understanding: patient states understanding of the procedure being performed  Patient consent: the patient's understanding of the procedure matches consent given  Site marked: the operative site was marked  Patient identity confirmed: verbally with patient    Supporting Documentation  Indications: pain   Procedure Details  Location: wrist - bilateral radiocarpal  Preparation: Patient was prepped and draped in the usual sterile fashion  Needle size: 25 G  Approach: dorsal    Medications (Right): 1 mL lidocaine 1 %; 40 mg triamcinolone acetonide 40 mg/mLMedications (Left): 1 mL lidocaine 1 %; 40 mg triamcinolone acetonide 40 mg/mL   Patient tolerance: patient tolerated the procedure well with no immediate complications  Dressing:  Sterile dressing applied

## 2023-08-17 ENCOUNTER — TELEPHONE (OUTPATIENT)
Age: 68
End: 2023-08-17

## 2023-08-17 ENCOUNTER — PREP FOR PROCEDURE (OUTPATIENT)
Age: 68
End: 2023-08-17

## 2023-08-17 DIAGNOSIS — Z86.010 HISTORY OF COLON POLYPS: Primary | ICD-10-CM

## 2023-08-17 NOTE — TELEPHONE ENCOUNTER
Scheduled date of colonoscopy (as of today): 10-   Physician performing colonoscopy: Dr. Higinio Jackson  Location of colonoscopy: [address] [facility]   Bowel prep reviewed with patient: ROOM 1  Instructions reviewed with patient by: patient would like to be called to discuss the prep kit option that was provided in the office.    Clearances: n/a

## 2023-08-17 NOTE — TELEPHONE ENCOUNTER
[address] [facility] Assessment    Name: Amanda Izquierdo  YOB: 1955  Last Height: 6' (1.829 m)  Last weight: 78.9 kg (174 lb)  BMI: 23.60 kg/m²  Procedure: colonoscopy  Diagnosis:hx of polyps   Date of procedure: 10-   Prep:  Responsible : yes Wife Ted Wright  Phone#: [phone]  Name completing form: Hermelindo Nino  Date form completed: 08/17/23      If the patient answers yes to any of these questions, schedule in a hospital  Are you pregnant: No  Do you rely on a wheelchair for mobility: No  Have you been diagnosed with End Stage Renal Disease (ESRD): No  Do you need oxygen during the day: No  Have you had a heart attack or stroke within the past three months: No  Have you had a seizure within the past three months: No  Have you ever been informed by anesthesia that you have a difficult airway: No  Additional Questions  Have you had any cardiac testing or are under the care of a Cardiologist (see cardiac list): No  Cardiac list:   Do you have an implanted cardiac defibrillator: No (Comment:  This patient should be scheduled in the hospital)    Have any bleeding problems, such as anemia or hemophilia (If patient has H&H result below 8, schedule in hospital.  H&H must be within 30 days of procedure): No    Had an organ transplant within the past 3 months: No    Do you have any present infections: No  Do you get short of breath when walking a few blocks: No  Have you been diagnosed with diabetes: No  Comments (provide cardiac provider information if applicable):

## 2023-08-17 NOTE — TELEPHONE ENCOUNTER
Date:    Screened by Chi Diaz     Referring Provider:      Pre- Screening:  BMI: [ x ] Has patient been referred for a routine screening Colonoscopy? [ Yes ]    Is the patient between the age of 43-73 years old? Yes   Past Colonoscopy? If yes - Date: [     ]   Physician/Facility: [     ]  Reason:  [                                Madiha Lambert: If the patient is between 39yrs-51yrs, please advise patient to confirm benefits/coverage with their insurance company for a routine screening colonoscopy, some insurance carriers will only cover at [facility] or [facility]. If the patient is over 66years old, please schedule an office visit. • Does the patient want to see a Gastroenterologist prior to their procedure OR are they having any GI symptoms? NO  • Has the patient been hospitalized or had abdominal surgery in the past 6 months? NO  • Does the patient use supplemental oxygen? NO  • Do you take [ Coumadin ], [ Lovenox ], [ Plavix ], [ Nubia Sung ], [ Guy Galla ], or other blood thinning medication? [ No ]   • Has had stroke, cardiac event or stent placed in the past year? NO     SCHEDULING STAFF: If patient answers NO to above questions, then schedule procedure. If patient answers YES to above questions, then schedule office appointment. If patient is between 45yrs - 49yrs, please advise patient that we will have to confirm benefits & coverage with their insurance company for a routine screening colonoscopy.

## 2023-10-06 ENCOUNTER — TELEPHONE (OUTPATIENT)
Dept: GASTROENTEROLOGY | Facility: CLINIC | Age: 68
End: 2023-10-06

## 2023-10-06 NOTE — TELEPHONE ENCOUNTER
Patients GI provider:  Dr. Gosia De La Fuente    Number to return call: [phone]    Reason for call: Pt wife, Alma Romero called in stating pt used clenpiq for colonoscopy last year and states it worked well with pt. She is requesting clenpiq be sent in instead of prep that has been ordered. If okay please send instructions to [facility] as well and call pts wife and let her know.     Scheduled procedure/appointment date if applicable: procedure 67/52

## 2023-10-09 ENCOUNTER — OFFICE VISIT (OUTPATIENT)
Dept: OBGYN CLINIC | Facility: CLINIC | Age: 68
End: 2023-10-09
Payer: MEDICARE

## 2023-10-09 VITALS
BODY MASS INDEX: 24.38 KG/M2 | SYSTOLIC BLOOD PRESSURE: 107 MMHG | HEIGHT: 72 IN | DIASTOLIC BLOOD PRESSURE: 80 MMHG | WEIGHT: 180 LBS

## 2023-10-09 DIAGNOSIS — M19.90 ARTHRITIS: ICD-10-CM

## 2023-10-09 DIAGNOSIS — M19.132 SLAC (SCAPHOLUNATE ADVANCED COLLAPSE) OF WRIST, LEFT: Primary | ICD-10-CM

## 2023-10-09 DIAGNOSIS — M19.131 SLAC (SCAPHOLUNATE ADVANCED COLLAPSE) OF WRIST, RIGHT: ICD-10-CM

## 2023-10-09 PROCEDURE — 99213 OFFICE O/P EST LOW 20 MIN: CPT | Performed by: ORTHOPAEDIC SURGERY

## 2023-10-09 PROCEDURE — 20605 DRAIN/INJ JOINT/BURSA W/O US: CPT | Performed by: ORTHOPAEDIC SURGERY

## 2023-10-09 RX ORDER — LIDOCAINE HYDROCHLORIDE 10 MG/ML
1 INJECTION, SOLUTION INFILTRATION; PERINEURAL
Status: COMPLETED | OUTPATIENT
Start: 2023-10-09 | End: 2023-10-09

## 2023-10-09 RX ORDER — TRIAMCINOLONE ACETONIDE 40 MG/ML
40 INJECTION, SUSPENSION INTRA-ARTICULAR; INTRAMUSCULAR
Status: COMPLETED | OUTPATIENT
Start: 2023-10-09 | End: 2023-10-09

## 2023-10-09 RX ADMIN — LIDOCAINE HYDROCHLORIDE 1 ML: 10 INJECTION, SOLUTION INFILTRATION; PERINEURAL at 12:30

## 2023-10-09 RX ADMIN — TRIAMCINOLONE ACETONIDE 40 MG: 40 INJECTION, SUSPENSION INTRA-ARTICULAR; INTRAMUSCULAR at 12:30

## 2023-10-09 NOTE — PROGRESS NOTES
ASSESSMENT/PLAN:    Assessment:   Bilateral SLAC wrist arthritis  Current smoker     Plan:   Bilateral wrist radiocarpal joint CSI's were performed in the office without complication using Kenalog   The CSI's may be repeated on a 3 month basis as needed   We discussed stem cell treatment would likely not be beneficial for him  Refill of Voltaren gel was prescribed and sent to his pharmacy electronically    He would have to quit smoking in order to undergo a 4 corner fusion     Follow Up:  3  month(s)    To Do Next Visit:  Bilateral radiocarpal joint CSI's using Kenalog       _____________________________________________________  CHIEF COMPLAINT:  Chief Complaint   Patient presents with   • Left Wrist - Follow-up     SLAC wrist arthritis   • Right Wrist - Follow-up     SLAC wrist arthritis         SUBJECTIVE:  Anmol Betancourt is a 70 y.o. male who presents for follow up regarding bilateral SLAC wrist.  Since last visit, Anmol Betancourt has tried a CSI with improvement. Today there is pain to the dorsal aspect of both wrists. He states pain started to return approx. 1 week ago. He will take Aleve for pain control and will also use Voltaren Gel 2x a day.        PAST MEDICAL HISTORY:  Past Medical History:   Diagnosis Date   • Allergic May 1 2022    Hayvever   • Arthritis     both knees   • Hypertension 10/01/20    Dr. Osacr Patel   • Pneumonia    • Prostate cancer [facility])        PAST SURGICAL HISTORY:  Past Surgical History:   Procedure Laterality Date   • CARPAL TUNNEL RELEASE  2015    Both wrists   • [place] PLMT INTERSTITIAL DEV RADIAT TX PROSTATE 1/MULT N/A 7/29/2021    Procedure: INSERTION OF FIDUCIAL MARKER (TRANSRECTAL ULTRASOUND GUIDANCE), SPACEOAR;  Surgeon: Ovidio Puckett MD;  Location[facility];  Service: Urology   • TONSILLECTOMY         FAMILY HISTORY:  Family History   Problem Relation Age of Onset   • Breast cancer Mother    • Substance Abuse Neg Hx    • Mental illness Neg Hx    • Colon cancer Neg Hx    • Colon polyps Neg Hx        SOCIAL HISTORY:  Social History     Tobacco Use   • Smoking status: Every Day     Packs/day: 1.00     Years: 40.00     Total pack years: 40.00     Types: Cigarettes     Start date: 4/27/1980   • Smokeless tobacco: Never   • Tobacco comments:     Pt refused smoking cessation   Vaping Use   • Vaping Use: Never used   Substance Use Topics   • Alcohol use: Yes     Alcohol/week: 28.0 standard drinks of alcohol     Types: 28 Shots of liquor per week     Comment: drinks fifth of alcohol during week   • Drug use: Not Currently     Types: Marijuana     Comment: daily       MEDICATIONS:    Current Outpatient Medications:   •  albuterol (PROVENTIL HFA,VENTOLIN HFA) 90 mcg/act inhaler, Inhale 2 puffs every 6 (six) hours as needed for wheezing, Disp: , Rfl:   •  amLODIPine (NORVASC) 5 mg tablet, Take 1 tablet (5 mg total) by mouth daily, Disp: 30 tablet, Rfl: 5  •  Diclofenac Sodium (VOLTAREN) 1 %, Apply 2 g topically 4 (four) times a day for 7 days, Disp: 56 g, Rfl: 0  •  Misc Natural Products (Osteo Bi-Flex Joint Shield) TABS, Take by mouth in the morning, Disp: , Rfl:   •  Multiple Vitamins-Minerals (multivitamin with minerals) tablet, Take 1 tablet by mouth daily, Disp: , Rfl:   •  Naproxen Sodium (ALEVE PO), Take by mouth 2 (two) times a day, Disp: , Rfl:   •  Omega-3 Fatty Acids (Omega-3 Fish Oil) 1000 MG CAPS, Take by mouth in the morning, Disp: , Rfl:   •  selenium 50 MCG TABS, Take 50 mcg by mouth daily, Disp: , Rfl:   •  sildenafil (VIAGRA) 100 mg tablet, Take 1 tablet (100 mg total) by mouth if needed for erectile dysfunction Take on empty stomach, 1 hour prior to sexual activity, no alcohol. 100 mg max dose, Disp: 30 tablet, Rfl: 3    ALLERGIES:  No Known Allergies    REVIEW OF SYSTEMS:  Pertinent items are noted in HPI. A comprehensive review of systems was negative.     LABS:  HgA1c:   Lab Results   Component Value Date    HGBA1C 5.7 (H) 05/14/2014     BMP:   Lab Results   Component Value Date GLUCOSE 103 05/14/2014    CALCIUM 9.6 06/01/2023     05/14/2014    K 3.8 06/01/2023    CO2 25 06/01/2023     06/01/2023    BUN 12 06/01/2023    CREATININE 1.11 06/01/2023           _____________________________________________________  PHYSICAL EXAMINATION:  Vital signs: /80   Ht 6' (1.829 m)   Wt 81.6 kg (180 lb)   BMI 24.41 kg/m²   General: well developed and well nourished, alert, oriented times 3 and appears comfortable  Psychiatric: Normal  HEENT: Trachea Midline, No torticollis  Cardiovascular: No discernable arrhythmia  Pulmonary: No wheezing or stridor  Abdomen: No rebound or guarding  Extremities: No peripheral edema  Skin: No masses, erythema, lacerations, fluctation, ulcerations  Neurovascular: Sensation Intact to the Median, Ulnar, Radial Nerve, Motor Intact to the Median, Ulnar, Radial Nerve and Pulses Intact    MUSCULOSKELETAL EXAMINATION:    LEFT SIDE:  Wrist: No erythema or ecchymosis, radiocarpal joint effusion, radiocarpal joint tenderness, extension approx. 30 degrees, flexion approx. 45 degrees     RIGHT SIDE:  Wrist:  No erythema or ecchymosis, radiocarpal joint effusion(moderate), radiocarpal joint tenderness, extension approx. 30 degrees, flexion approx. 45 degrees     _____________________________________________________  STUDIES REVIEWED:  No Studies to review      PROCEDURES PERFORMED:  Medium joint arthrocentesis: bilateral radiocarpal  Universal Protocol:  Consent: Verbal consent obtained. Written consent not obtained. Risks and benefits: risks, benefits and alternatives were discussed  Consent given by: patient  Time out: Immediately prior to procedure a "time out" was called to verify the correct patient, procedure, equipment, support staff and site/side marked as required.   Patient understanding: patient states understanding of the procedure being performed  Site marked: the operative site was marked  Patient identity confirmed: verbally with patient    Supporting Documentation  Indications: pain   Procedure Details  Location: wrist - bilateral radiocarpal  Preparation: Patient was prepped and draped in the usual sterile fashion  Needle size: 25 G  Ultrasound guidance: no    Medications (Right): 1 mL lidocaine 1 %; 40 mg triamcinolone acetonide 40 mg/mLMedications (Left): 1 mL lidocaine 1 %; 40 mg triamcinolone acetonide 40 mg/mL   Patient tolerance: patient tolerated the procedure well with no immediate complications  Dressing:  Sterile dressing applied             Scribe Attestation    I,:  Sabi Wolf am acting as a scribe while in the presence of the attending physician.:       I,:  Jonn Ramos MD personally performed the services described in this documentation    as scribed in my presence.:

## 2023-10-16 ENCOUNTER — ANESTHESIA EVENT (OUTPATIENT)
Dept: GASTROENTEROLOGY | Facility: AMBULATORY SURGERY CENTER | Age: 68
End: 2023-10-16

## 2023-10-16 ENCOUNTER — HOSPITAL ENCOUNTER (OUTPATIENT)
Dept: GASTROENTEROLOGY | Facility: AMBULATORY SURGERY CENTER | Age: 68
Discharge: HOME | End: 2023-10-16
Payer: MEDICARE

## 2023-10-16 ENCOUNTER — ANESTHESIA (OUTPATIENT)
Dept: GASTROENTEROLOGY | Facility: AMBULATORY SURGERY CENTER | Age: 68
End: 2023-10-16

## 2023-10-16 ENCOUNTER — TELEPHONE (OUTPATIENT)
Age: 68
End: 2023-10-16

## 2023-10-16 VITALS
OXYGEN SATURATION: 99 % | RESPIRATION RATE: 14 BRPM | WEIGHT: 180 LBS | DIASTOLIC BLOOD PRESSURE: 79 MMHG | SYSTOLIC BLOOD PRESSURE: 116 MMHG | TEMPERATURE: 96.7 F | BODY MASS INDEX: 23.86 KG/M2 | HEIGHT: 73 IN | HEART RATE: 69 BPM

## 2023-10-16 DIAGNOSIS — Z86.010 HISTORY OF COLON POLYPS: ICD-10-CM

## 2023-10-16 PROCEDURE — G0105 COLORECTAL SCRN; HI RISK IND: HCPCS | Performed by: INTERNAL MEDICINE

## 2023-10-16 RX ORDER — SODIUM CHLORIDE, SODIUM LACTATE, POTASSIUM CHLORIDE, CALCIUM CHLORIDE 600; 310; 30; 20 MG/100ML; MG/100ML; MG/100ML; MG/100ML
50 INJECTION, SOLUTION INTRAVENOUS CONTINUOUS
Status: DISCONTINUED | OUTPATIENT
Start: 2023-10-16 | End: 2023-10-20 | Stop reason: HOSPADM

## 2023-10-16 RX ORDER — SODIUM CHLORIDE 9 MG/ML
50 INJECTION, SOLUTION INTRAVENOUS CONTINUOUS
Status: DISCONTINUED | OUTPATIENT
Start: 2023-10-16 | End: 2023-10-20 | Stop reason: HOSPADM

## 2023-10-16 RX ORDER — PROPOFOL 10 MG/ML
INJECTION, EMULSION INTRAVENOUS AS NEEDED
Status: DISCONTINUED | OUTPATIENT
Start: 2023-10-16 | End: 2023-10-16

## 2023-10-16 RX ORDER — LIDOCAINE HYDROCHLORIDE 10 MG/ML
INJECTION, SOLUTION EPIDURAL; INFILTRATION; INTRACAUDAL; PERINEURAL AS NEEDED
Status: DISCONTINUED | OUTPATIENT
Start: 2023-10-16 | End: 2023-10-16

## 2023-10-16 RX ORDER — SODIUM CHLORIDE, SODIUM LACTATE, POTASSIUM CHLORIDE, CALCIUM CHLORIDE 600; 310; 30; 20 MG/100ML; MG/100ML; MG/100ML; MG/100ML
INJECTION, SOLUTION INTRAVENOUS CONTINUOUS PRN
Status: DISCONTINUED | OUTPATIENT
Start: 2023-10-16 | End: 2023-10-16

## 2023-10-16 RX ADMIN — PROPOFOL 150 MG: 10 INJECTION, EMULSION INTRAVENOUS at 12:33

## 2023-10-16 RX ADMIN — SODIUM CHLORIDE 50 ML/HR: 9 INJECTION, SOLUTION INTRAVENOUS at 11:40

## 2023-10-16 RX ADMIN — PROPOFOL 30 MG: 10 INJECTION, EMULSION INTRAVENOUS at 12:48

## 2023-10-16 RX ADMIN — PROPOFOL 50 MG: 10 INJECTION, EMULSION INTRAVENOUS at 12:38

## 2023-10-16 RX ADMIN — PROPOFOL 50 MG: 10 INJECTION, EMULSION INTRAVENOUS at 12:35

## 2023-10-16 RX ADMIN — LIDOCAINE HYDROCHLORIDE 50 MG: 10 INJECTION, SOLUTION EPIDURAL; INFILTRATION; INTRACAUDAL; PERINEURAL at 12:33

## 2023-10-16 RX ADMIN — PROPOFOL 50 MG: 10 INJECTION, EMULSION INTRAVENOUS at 12:34

## 2023-10-16 RX ADMIN — PROPOFOL 30 MG: 10 INJECTION, EMULSION INTRAVENOUS at 12:45

## 2023-10-16 RX ADMIN — PROPOFOL 20 MG: 10 INJECTION, EMULSION INTRAVENOUS at 12:40

## 2023-10-16 RX ADMIN — PROPOFOL 20 MG: 10 INJECTION, EMULSION INTRAVENOUS at 12:50

## 2023-10-16 RX ADMIN — PROPOFOL 30 MG: 10 INJECTION, EMULSION INTRAVENOUS at 12:42

## 2023-10-16 RX ADMIN — PROPOFOL 30 MG: 10 INJECTION, EMULSION INTRAVENOUS at 12:46

## 2023-10-16 RX ADMIN — SODIUM CHLORIDE, SODIUM LACTATE, POTASSIUM CHLORIDE, CALCIUM CHLORIDE: 600; 310; 30; 20 INJECTION, SOLUTION INTRAVENOUS at 12:30

## 2023-10-16 NOTE — ANESTHESIA PREPROCEDURE EVALUATION
Procedure:  COLONOSCOPY    Relevant Problems   CARDIO   (+) Essential hypertension      /RENAL   (+) Prostate cancer (HCC)      MUSCULOSKELETAL   (+) Primary osteoarthritis of both knees      PULMONARY   (+) Smoking      Heavy EtOH use  Physical Exam    Airway    Mallampati score: II  TM Distance: <3 FB  Neck ROM: full     Dental   Comment: None loose - poor dentition    Many chipped teeth     Cardiovascular      Pulmonary      Other Findings        Anesthesia Plan  ASA Score- 3     Anesthesia Type- IV sedation with anesthesia with ASA Monitors. Additional Monitors:     Airway Plan:     Comment: Last of PO bowel prep: 05:00    Patient educated on the possibility for awareness under sedation and of the possibility of airway intervention in the event of an airway or procedural emergency  . Plan Factors-Exercise tolerance (METS): <4 METS. Chart reviewed. Patient summary reviewed. Patient is a current smoker. Patient instructed to abstain from smoking on day of procedure. Patient smoked on day of surgery. Induction- intravenous. Postoperative Plan-     Informed Consent- Anesthetic plan and risks discussed with patient. I personally reviewed this patient with the CRNA. Discussed and agreed on the Anesthesia Plan with the CRNA. Lidia Phalen

## 2023-10-16 NOTE — ANESTHESIA POSTPROCEDURE EVALUATION
Post-Op Assessment Note    CV Status:  Stable    Pain management: adequate     Mental Status:  Alert and awake   Hydration Status:  Euvolemic   PONV Controlled:  Controlled   Airway Patency:  Patent      Post Op Vitals Reviewed: Yes      Staff: Anesthesiologist, CRNA   Comments: report given to RN; VSS; RA      No notable events documented.     BP   99/79   Temp     Pulse  82   Resp   16   SpO2   97

## 2023-10-16 NOTE — H&P
History and Physical - [facility]  [facility] 70 y.o. male MRN: [id number]    HPI: [facility] is a 70y.o. year old male who presents for colonoscopy for history of polyps    REVIEW OF SYSTEMS: Per the HPI, and otherwise unremarkable.     Historical Information   Past Medical History:   Diagnosis Date    Allergic May 1 2022    Hayvever    Arthritis     both knees and wrists    Hypertension 10/01/20    Dr. Martinez Young    Pneumonia     Prostate cancer [facility])      Past Surgical History:   Procedure Laterality Date    CARPAL TUNNEL RELEASE  2015    Both wrists    COLONOSCOPY      [place] PLMT INTERSTITIAL DEV RADIAT TX PROSTATE 1/MULT N/A 07/29/2021    Procedure: INSERTION OF FIDUCIAL MARKER (TRANSRECTAL ULTRASOUND GUIDANCE), SPACEOAR;  Surgeon: Ela Diaz MD;  Location[facility];  Service: Urology    TONSILLECTOMY       Social History   Social History     Substance and Sexual Activity   Alcohol Use Yes    Alcohol/week: 28.0 standard drinks of alcohol    Types: 28 Shots of liquor per week    Comment: drinks fifth of alcohol during week     Social History     Substance and Sexual Activity   Drug Use Not Currently    Types: Marijuana    Comment: daily     Social History     Tobacco Use   Smoking Status Every Day    Packs/day: 1.00    Years: 40.00    Total pack years: 40.00    Types: Cigarettes    Start date: 4/27/1980   Smokeless Tobacco Never   Tobacco Comments    Pt refused smoking cessation     Family History   Problem Relation Age of Onset    Breast cancer Mother     Substance Abuse Neg Hx     Mental illness Neg Hx     Colon cancer Neg Hx     Colon polyps Neg Hx        Meds/Allergies       Current Outpatient Medications:     selenium 50 MCG TABS    albuterol (PROVENTIL HFA,VENTOLIN HFA) 90 mcg/act inhaler    amLODIPine (NORVASC) 5 mg tablet    Diclofenac Sodium (VOLTAREN) 1 %    Misc Natural Products (Osteo Bi-Flex Joint Shield) TABS    Multiple Vitamins-Minerals (multivitamin with minerals) tablet    Naproxen Sodium (ALEVE PO)    Omega-3 Fatty Acids (Omega-3 Fish Oil) 1000 MG CAPS    sildenafil (VIAGRA) 100 mg tablet    Current Facility-Administered Medications:     lactated ringers infusion, 50 mL/hr, Intravenous, Continuous    sodium chloride 0.9 % infusion, 50 mL/hr, Intravenous, Continuous, 50 mL/hr at 10/16/23 1140    No Known Allergies    Objective     /96   Pulse 88   Temp (!) 96.7 °F (35.9 °C) (Temporal)   Resp 17   Ht 6' 1" (1.854 m)   Wt 81.6 kg (180 lb)   SpO2 99%   BMI 23.75 kg/m²     PHYSICAL EXAM    Gen: NAD AAOx3  Head: Normocephalic, Atraumatic  CV: S1S2 RRR no m/r/g  CHEST: Clear b/l no c/r/w  ABD: soft, +BS NT/ND  EXT: no edema    ASSESSMENT/PLAN:  This is a 70y.o. year old male here for colonoscopy, and he is stable and optimized for his procedure.

## 2023-10-16 NOTE — TELEPHONE ENCOUNTER
Pts wife called to ask if patient was able to drink black coffee before his procedure today. Advice nothing to eat or drink after midnight per [employer] Instructions.  Thank you

## 2023-10-19 ENCOUNTER — TELEPHONE (OUTPATIENT)
Dept: UROLOGY | Facility: AMBULATORY SURGERY CENTER | Age: 68
End: 2023-10-19

## 2023-10-19 NOTE — TELEPHONE ENCOUNTER
sildenafil (VIAGRA) 100 mg tablet [[id number]]         [facility] [address] [place] [address] - [employer]  [employer] [phone]

## 2023-10-20 DIAGNOSIS — C61 PROSTATE CANCER (HCC): ICD-10-CM

## 2023-10-20 DIAGNOSIS — N52.9 ERECTILE DYSFUNCTION, UNSPECIFIED ERECTILE DYSFUNCTION TYPE: ICD-10-CM

## 2023-10-20 RX ORDER — SILDENAFIL 100 MG/1
100 TABLET, FILM COATED ORAL AS NEEDED
Qty: 30 TABLET | Refills: 3 | Status: SHIPPED | OUTPATIENT
Start: 2023-10-20

## 2023-10-29 ENCOUNTER — TELEPHONE (OUTPATIENT)
Dept: UROLOGY | Facility: CLINIC | Age: 68
End: 2023-10-29

## 2023-11-01 NOTE — TELEPHONE ENCOUNTER
LVM to let pt know that his 11/15 apt had to canceled due to the provider not with this office. Clinical reviewed chart and stated that he can be seen at his 1y apt with blood work being done now and before that visit as well. A letter is being sent to also let him know of this change.

## 2024-01-05 ENCOUNTER — OFFICE VISIT (OUTPATIENT)
Dept: FAMILY MEDICINE CLINIC | Facility: CLINIC | Age: 69
End: 2024-01-05
Payer: MEDICARE

## 2024-01-05 VITALS
SYSTOLIC BLOOD PRESSURE: 113 MMHG | BODY MASS INDEX: 22.03 KG/M2 | WEIGHT: 167 LBS | OXYGEN SATURATION: 98 % | HEART RATE: 113 BPM | DIASTOLIC BLOOD PRESSURE: 83 MMHG | TEMPERATURE: 96.3 F

## 2024-01-05 DIAGNOSIS — C61 PROSTATE CANCER (HCC): ICD-10-CM

## 2024-01-05 DIAGNOSIS — Z01.818 PRE-OPERATIVE CLEARANCE: Primary | ICD-10-CM

## 2024-01-05 PROCEDURE — 99213 OFFICE O/P EST LOW 20 MIN: CPT | Performed by: NURSE PRACTITIONER

## 2024-01-05 NOTE — PROGRESS NOTES
[facility]        NAME: Vincent Clemons is a 68 y.o. male  : 1955    MRN: [id number]  DATE: 2024  TIME: 1:44 PM    Assessment and Plan   Pre-operative clearance [Z01.818]  1. Pre-operative clearance        2. Prostate cancer (HCC)              Patient Instructions     Patient Instructions   Patient is medically cleared for surgery  Forms completed and faxed to surgeon at [phone]        Chief Complaint     Chief Complaint   Patient presents with    Pre-op Exam         History of Present Illness       Here for pre-op clearance for right eye retina surgery schedule 24  Hx of prostate cancer treated with radiation. Patient has no problems/complaints. No orders for pre-op labs/EKG. BP is stable.        Review of Systems   Review of Systems   Constitutional:  Negative for activity change, appetite change, chills, diaphoresis, fatigue and fever.   HENT:  Negative for congestion, dental problem, drooling, ear discharge, ear pain, facial swelling, hearing loss, mouth sores, nosebleeds, postnasal drip, rhinorrhea, sinus pressure, sinus pain, sneezing, sore throat, tinnitus, trouble swallowing and voice change.    Eyes:  Negative for discharge.   Respiratory:  Negative for apnea, cough, choking, chest tightness, shortness of breath, wheezing and stridor.    Cardiovascular:  Negative for chest pain, palpitations and leg swelling.   Gastrointestinal:  Negative for abdominal distention, abdominal pain, anal bleeding, blood in stool, constipation, diarrhea, nausea, rectal pain and vomiting.   Endocrine: Negative for cold intolerance, heat intolerance, polydipsia, polyphagia and polyuria.   Genitourinary:  Negative for difficulty urinating, dysuria, flank pain, frequency, hematuria and urgency.   Musculoskeletal:  Negative for arthralgias, back pain, gait problem, joint swelling, myalgias, neck pain and neck stiffness.   Skin:  Negative for color change, pallor, rash and  wound.   Neurological:  Negative for dizziness, tremors, seizures, syncope, facial asymmetry, speech difficulty, weakness, light-headedness, numbness and headaches.   Hematological:  Negative for adenopathy. Does not bruise/bleed easily.   Psychiatric/Behavioral:  Negative for agitation, behavioral problems, confusion, decreased concentration, dysphoric mood, hallucinations, self-injury, sleep disturbance and suicidal ideas. The patient is not nervous/anxious and is not hyperactive.          Current Medications       Current Outpatient Medications:     albuterol (PROVENTIL HFA,VENTOLIN HFA) 90 mcg/act inhaler, Inhale 2 puffs every 6 (six) hours as needed for wheezing, Disp: , Rfl:     amLODIPine (NORVASC) 5 mg tablet, Take 1 tablet (5 mg total) by mouth daily, Disp: 30 tablet, Rfl: 5    Misc Natural Products (Osteo Bi-Flex Joint Shield) TABS, Take by mouth in the morning, Disp: , Rfl:     Multiple Vitamins-Minerals (multivitamin with minerals) tablet, Take 1 tablet by mouth daily, Disp: , Rfl:     Naproxen Sodium (ALEVE PO), Take by mouth 2 (two) times a day, Disp: , Rfl:     Omega-3 Fatty Acids (Omega-3 Fish Oil) 1000 MG CAPS, Take by mouth in the morning, Disp: , Rfl:     selenium 50 MCG TABS, Take 50 mcg by mouth daily, Disp: , Rfl:     sildenafil (VIAGRA) 100 mg tablet, Take 1 tablet (100 mg total) by mouth if needed for erectile dysfunction Take on empty stomach, 1 hour prior to sexual activity, no alcohol. 100 mg max dose, Disp: 30 tablet, Rfl: 3    Diclofenac Sodium (VOLTAREN) 1 %, Apply 2 g topically 4 (four) times a day for 7 days, Disp: 56 g, Rfl: 0    Current Allergies     Allergies as of 01/05/2024    (No Known Allergies)            The following portions of the patient's history were reviewed and updated as appropriate: allergies, current medications, past family history, past medical history, past social history, past surgical history and problem list.     Past Medical History:   Diagnosis Date     Allergic May 1 2022    Hayvever    Arthritis     both knees and wrists    Hypertension 10/01/20    Dr. Maradiaga    Pneumonia     Prostate cancer (HCC)        Past Surgical History:   Procedure Laterality Date    CARPAL TUNNEL RELEASE  2015    Both wrists    COLONOSCOPY      [place] PLMT INTERSTITIAL DEV RADIAT TX PROSTATE 1/MULT N/A 07/29/2021    Procedure: INSERTION OF FIDUCIAL MARKER (TRANSRECTAL ULTRASOUND GUIDANCE), SPACEOAR;  Surgeon: Fito Lynn MD;  Location[facility];  Service: Urology    TONSILLECTOMY         Family History   Problem Relation Age of Onset    Breast cancer Mother 50    Substance Abuse Neg Hx     Mental illness Neg Hx     Colon cancer Neg Hx     Colon polyps Neg Hx          Medications have been verified.        Objective   /83 (BP Location: Left arm, Patient Position: Sitting, Cuff Size: Standard)   Pulse (!) 113   Temp (!) 96.3 °F (35.7 °C) (Tympanic)   Wt 75.8 kg (167 lb)   SpO2 98%   BMI 22.03 kg/m²        Physical Exam     Physical Exam  Vitals and nursing note reviewed.   Constitutional:       General: He is not in acute distress.     Appearance: Normal appearance. He is well-developed and normal weight. He is not ill-appearing, toxic-appearing or diaphoretic.   HENT:      Head: Normocephalic and atraumatic.      Right Ear: Tympanic membrane, ear canal and external ear normal. There is no impacted cerumen.      Left Ear: Tympanic membrane, ear canal and external ear normal. There is no impacted cerumen.      Nose: Nose normal. No congestion or rhinorrhea.      Right Sinus: No maxillary sinus tenderness or frontal sinus tenderness.      Left Sinus: No maxillary sinus tenderness or frontal sinus tenderness.      Mouth/Throat:      Mouth: Mucous membranes are moist.      Pharynx: Uvula midline. No oropharyngeal exudate or posterior oropharyngeal erythema.   Eyes:      General:         Right eye: No discharge.         Left eye: No discharge.      Extraocular Movements: Extraocular  movements intact.      Conjunctiva/sclera: Conjunctivae normal.      Pupils: Pupils are equal, round, and reactive to light.   Neck:      Thyroid: No thyromegaly.      Vascular: No carotid bruit.      Trachea: No tracheal deviation.   Cardiovascular:      Rate and Rhythm: Normal rate and regular rhythm.      Heart sounds: Normal heart sounds. No murmur heard.     No friction rub. No gallop.   Pulmonary:      Effort: Pulmonary effort is normal. No respiratory distress.      Breath sounds: Normal breath sounds. No stridor. No wheezing, rhonchi or rales.   Chest:      Chest wall: No tenderness.   Abdominal:      General: Bowel sounds are normal. There is no distension.      Palpations: Abdomen is soft. There is no mass.      Tenderness: There is no abdominal tenderness. There is no right CVA tenderness, left CVA tenderness, guarding or rebound.      Hernia: No hernia is present.   Musculoskeletal:         General: No swelling, tenderness, deformity or signs of injury. Normal range of motion.      Cervical back: Normal range of motion and neck supple. No rigidity or tenderness.      Right lower leg: No edema.      Left lower leg: No edema.   Lymphadenopathy:      Cervical: No cervical adenopathy.   Skin:     General: Skin is warm and dry.      Coloration: Skin is not pale.      Findings: No bruising, erythema or rash.   Neurological:      General: No focal deficit present.      Mental Status: He is alert and oriented to person, place, and time.      Cranial Nerves: No cranial nerve deficit.      Sensory: No sensory deficit.      Motor: No weakness.      Coordination: Coordination normal.      Gait: Gait normal.      Deep Tendon Reflexes: Reflexes normal.   Psychiatric:         Mood and Affect: Mood normal.         Speech: Speech normal.         Behavior: Behavior normal.         Thought Content: Thought content normal.         Judgment: Judgment normal.       PHQ-2/9 Depression Screening    Little interest or pleasure  in doing things: 0 - not at all  Feeling down, depressed, or hopeless: 0 - not at all  PHQ-2 Score: 0  PHQ-2 Interpretation: Negative depression screen

## 2024-01-08 ENCOUNTER — OFFICE VISIT (OUTPATIENT)
Dept: OBGYN CLINIC | Facility: CLINIC | Age: 69
End: 2024-01-08
Payer: MEDICARE

## 2024-01-08 VITALS
HEIGHT: 73 IN | BODY MASS INDEX: 23.46 KG/M2 | SYSTOLIC BLOOD PRESSURE: 126 MMHG | DIASTOLIC BLOOD PRESSURE: 78 MMHG | WEIGHT: 177 LBS

## 2024-01-08 DIAGNOSIS — M19.131 SLAC (SCAPHOLUNATE ADVANCED COLLAPSE) OF WRIST, RIGHT: ICD-10-CM

## 2024-01-08 DIAGNOSIS — M19.132 SLAC (SCAPHOLUNATE ADVANCED COLLAPSE) OF WRIST, LEFT: Primary | ICD-10-CM

## 2024-01-08 PROCEDURE — 99213 OFFICE O/P EST LOW 20 MIN: CPT | Performed by: ORTHOPAEDIC SURGERY

## 2024-01-08 NOTE — PROGRESS NOTES
ASSESSMENT/PLAN:    Assessment:   Bilateral SLAC wrist arthritis  Current smoker     Plan:   Will differ repeat radiocarpal joint CSI's at this time as he is undergoing retina surgery in 3 days   Follow up in the office 2-4 weeks after his eyes are healed for bilateral radiocarpal joint CSIs using Kenalog     Follow Up:  PRN    To Do Next Visit:  Bilateral radiocarpal joint CSI's using Kenalog     _____________________________________________________  CHIEF COMPLAINT:  Chief Complaint   Patient presents with    Left Wrist - Follow-up     SLAC - Kenalog     Right Wrist - Follow-up     SLAC - Kenalog          SUBJECTIVE:  Vincent Clemons is a 68 y.o. male who presents for follow up regarding bilateral SLAC wrist.  Since last visit, Vincent Clemons has tried steroid injections with relief.  Today there is pain to the dorsal aspect of both wrists. He is taking Aleve and using Voltaren Gel for pain control. He will be undergoing retina surgery in 3 days.       PAST MEDICAL HISTORY:  Past Medical History:   Diagnosis Date    Allergic May 1 2022    Hayvever    Arthritis     both knees and wrists    Hypertension 10/01/20    Dr. Maradiaga    Pneumonia     Prostate cancer (HCC)        PAST SURGICAL HISTORY:  Past Surgical History:   Procedure Laterality Date    CARPAL TUNNEL RELEASE  2015    Both wrists    COLONOSCOPY      [place] PLMT INTERSTITIAL DEV RADIAT TX PROSTATE 1/MULT N/A 07/29/2021    Procedure: INSERTION OF FIDUCIAL MARKER (TRANSRECTAL ULTRASOUND GUIDANCE), SPACEOAR;  Surgeon: Fito Lynn MD;  Location: [facility];  Service: Urology    TONSILLECTOMY         FAMILY HISTORY:  Family History   Problem Relation Age of Onset    Breast cancer Mother 50    Substance Abuse Neg Hx     Mental illness Neg Hx     Colon cancer Neg Hx     Colon polyps Neg Hx        SOCIAL HISTORY:  Social History     Tobacco Use    Smoking status: Every Day     Current packs/day: 1.00     Average packs/day: 1 pack/day for 43.7 years (43.7 ttl  pk-yrs)     Types: Cigarettes     Start date: 4/27/1980    Smokeless tobacco: Never    Tobacco comments:     Pt refused smoking cessation   Vaping Use    Vaping status: Never Used   Substance Use Topics    Alcohol use: Yes     Alcohol/week: 28.0 standard drinks of alcohol     Types: 28 Shots of liquor per week     Comment: drinks fifth of alcohol during week    Drug use: Not Currently     Types: Marijuana     Comment: daily       MEDICATIONS:    Current Outpatient Medications:     albuterol (PROVENTIL HFA,VENTOLIN HFA) 90 mcg/act inhaler, Inhale 2 puffs every 6 (six) hours as needed for wheezing, Disp: , Rfl:     amLODIPine (NORVASC) 5 mg tablet, Take 1 tablet (5 mg total) by mouth daily, Disp: 30 tablet, Rfl: 5    Diclofenac Sodium (VOLTAREN) 1 %, Apply 2 g topically 4 (four) times a day for 7 days, Disp: 56 g, Rfl: 0    Misc Natural Products (Osteo Bi-Flex Joint Shield) TABS, Take by mouth in the morning, Disp: , Rfl:     Multiple Vitamins-Minerals (multivitamin with minerals) tablet, Take 1 tablet by mouth daily, Disp: , Rfl:     Naproxen Sodium (ALEVE PO), Take by mouth 2 (two) times a day, Disp: , Rfl:     Omega-3 Fatty Acids (Omega-3 Fish Oil) 1000 MG CAPS, Take by mouth in the morning, Disp: , Rfl:     selenium 50 MCG TABS, Take 50 mcg by mouth daily, Disp: , Rfl:     sildenafil (VIAGRA) 100 mg tablet, Take 1 tablet (100 mg total) by mouth if needed for erectile dysfunction Take on empty stomach, 1 hour prior to sexual activity, no alcohol. 100 mg max dose, Disp: 30 tablet, Rfl: 3    ALLERGIES:  No Known Allergies    REVIEW OF SYSTEMS:  Pertinent items are noted in HPI.  A comprehensive review of systems was negative.    LABS:  HgA1c:   Lab Results   Component Value Date    HGBA1C 5.7 (H) 05/14/2014     BMP:   Lab Results   Component Value Date    GLUCOSE 103 05/14/2014    CALCIUM 9.6 06/01/2023     05/14/2014    K 3.8 06/01/2023    CO2 25 06/01/2023     06/01/2023    BUN 12 06/01/2023     "CREATININE 1.11 06/01/2023           _____________________________________________________  PHYSICAL EXAMINATION:  Vital signs: /78   Ht 6' 1\" (1.854 m)   Wt 80.3 kg (177 lb)   BMI 23.35 kg/m²   General: well developed and well nourished, alert, oriented times 3, and appears comfortable  Psychiatric: Normal  HEENT: Trachea Midline, No torticollis  Cardiovascular: No discernable arrhythmia  Pulmonary: No wheezing or stridor  Abdomen: No rebound or guarding  Extremities: No peripheral edema  Skin: No masses, erythema, lacerations, fluctation, ulcerations  Neurovascular: Sensation Intact to the Median, Ulnar, Radial Nerve, Motor Intact to the Median, Ulnar, Radial Nerve, and Pulses Intact    MUSCULOSKELETAL EXAMINATION:    BILATERAL SIDE: Radiocarpal joint effusions, radiocarpal joint tenderness, approx. 30 degrees of extension and 45 degrees of flexion   Good composite fist formation noted.  Utilizing both hands to put splints on.  Small compressive wrap noticed today around his left wrist.  No signs of infection noted.  _____________________________________________________  STUDIES REVIEWED:  No Studies to review      PROCEDURES PERFORMED:  Procedures  No Procedures performed today    Scribe Attestation      I,:   am acting as a scribe while in the presence of the attending physician.:       I,:   personally performed the services described in this documentation    as scribed in my presence.:             "

## 2024-02-14 DIAGNOSIS — N52.9 ERECTILE DYSFUNCTION, UNSPECIFIED ERECTILE DYSFUNCTION TYPE: ICD-10-CM

## 2024-02-14 DIAGNOSIS — C61 PROSTATE CANCER (HCC): ICD-10-CM

## 2024-02-14 DIAGNOSIS — I10 ESSENTIAL HYPERTENSION: ICD-10-CM

## 2024-02-14 NOTE — TELEPHONE ENCOUNTER
Please review to see if the refill is appropriate.  Last prescriber Dr Kessler (inpatient only now)

## 2024-02-15 RX ORDER — AMLODIPINE BESYLATE 5 MG/1
5 TABLET ORAL DAILY
Qty: 30 TABLET | Refills: 5 | Status: SHIPPED | OUTPATIENT
Start: 2024-02-15

## 2024-02-15 RX ORDER — SILDENAFIL 100 MG/1
TABLET, FILM COATED ORAL
Qty: 30 TABLET | Refills: 3 | Status: SHIPPED | OUTPATIENT
Start: 2024-02-15

## 2024-05-08 DIAGNOSIS — I10 ESSENTIAL HYPERTENSION: ICD-10-CM

## 2024-05-08 RX ORDER — AMLODIPINE BESYLATE 5 MG/1
5 TABLET ORAL DAILY
Qty: 30 TABLET | Refills: 0 | OUTPATIENT
Start: 2024-05-08

## 2024-05-08 NOTE — TELEPHONE ENCOUNTER
From: Vincent Clemons  To: Office of Mateo Maradiaga MD  Sent: 5/3/2024 2:08 PM EDT  Subject: Medication Renewal Request    Refills have been requested for the following medications:     amLODIPine (NORVASC) 5 mg tablet [SEGUN Hazel]    Preferred pharmacy: [facility] - [address] [place] [address]

## 2024-05-13 ENCOUNTER — OFFICE VISIT (OUTPATIENT)
Dept: OBGYN CLINIC | Facility: CLINIC | Age: 69
End: 2024-05-13
Payer: MEDICARE

## 2024-05-13 VITALS
DIASTOLIC BLOOD PRESSURE: 84 MMHG | BODY MASS INDEX: 23.46 KG/M2 | WEIGHT: 177 LBS | SYSTOLIC BLOOD PRESSURE: 118 MMHG | HEIGHT: 73 IN

## 2024-05-13 DIAGNOSIS — M19.131 SLAC (SCAPHOLUNATE ADVANCED COLLAPSE) OF WRIST, RIGHT: ICD-10-CM

## 2024-05-13 DIAGNOSIS — M19.132 SLAC (SCAPHOLUNATE ADVANCED COLLAPSE) OF WRIST, LEFT: Primary | ICD-10-CM

## 2024-05-13 PROCEDURE — 20605 DRAIN/INJ JOINT/BURSA W/O US: CPT | Performed by: PHYSICIAN ASSISTANT

## 2024-05-13 PROCEDURE — 99213 OFFICE O/P EST LOW 20 MIN: CPT | Performed by: ORTHOPAEDIC SURGERY

## 2024-05-13 RX ORDER — TRIAMCINOLONE ACETONIDE 40 MG/ML
40 INJECTION, SUSPENSION INTRA-ARTICULAR; INTRAMUSCULAR
Status: COMPLETED | OUTPATIENT
Start: 2024-05-13 | End: 2024-05-13

## 2024-05-13 RX ORDER — ROPIVACAINE HYDROCHLORIDE 5 MG/ML
10 INJECTION, SOLUTION EPIDURAL; INFILTRATION; PERINEURAL
Status: COMPLETED | OUTPATIENT
Start: 2024-05-13 | End: 2024-05-13

## 2024-05-13 RX ADMIN — TRIAMCINOLONE ACETONIDE 40 MG: 40 INJECTION, SUSPENSION INTRA-ARTICULAR; INTRAMUSCULAR at 17:15

## 2024-05-13 RX ADMIN — ROPIVACAINE HYDROCHLORIDE 10 ML: 5 INJECTION, SOLUTION EPIDURAL; INFILTRATION; PERINEURAL at 17:15

## 2024-05-13 NOTE — PROGRESS NOTES
ASSESSMENT/PLAN:    Assessment:   Bilateral SLAC wrist arthritis  Current smoker     Plan:   Patient was provided bilateral wrist cortisone injections with Kenalog today.  He tolerated well.  He was advised that we can repeat the cortisone injections every 3 to 4 months as needed for pain  He will follow-up in 3 months for reevaluation    Follow Up:  3 months    To Do Next Visit:  Bilateral radiocarpal joint CSI's using Kenalog     _____________________________________________________  CHIEF COMPLAINT:  Chief Complaint   Patient presents with    Left Wrist - Follow-up     SLAC - Kenalog     Right Wrist - Follow-up     SLAC - Kenalog          SUBJECTIVE:  Vincent Clemons is a 68 y.o. male who presents for follow up regarding bilateral SLAC wrist.  Since last visit, Vincent Clemons has tried steroid injections with relief.  Today there is pain to the dorsal aspect of both wrists. He is taking Aleve and using Voltaren Gel for pain control.  He states that he would like to proceed with cortisone injections today.      PAST MEDICAL HISTORY:  Past Medical History:   Diagnosis Date    Allergic May 1 2022    [facility]vever    Arthritis     both knees and wrists    Hypertension 10/01/20    Dr. Maradiaga    Pneumonia     Prostate cancer (HCC)        PAST SURGICAL HISTORY:  Past Surgical History:   Procedure Laterality Date    CARPAL TUNNEL RELEASE  2015    Both wrists    COLONOSCOPY      [place] PLMT INTERSTITIAL DEV RADIAT TX PROSTATE 1/MULT N/A 07/29/2021    Procedure: INSERTION OF FIDUCIAL MARKER (TRANSRECTAL ULTRASOUND GUIDANCE), SPACEOAR;  Surgeon: Fito Lynn MD;  Location: [facility];  Service: Urology    TONSILLECTOMY         FAMILY HISTORY:  Family History   Problem Relation Age of Onset    Breast cancer Mother 50    Substance Abuse Neg Hx     Mental illness Neg Hx     Colon cancer Neg Hx     Colon polyps Neg Hx        SOCIAL HISTORY:  Social History     Tobacco Use    Smoking status: Every Day     Current packs/day: 1.00      Average packs/day: 1 pack/day for 44.0 years (44.0 ttl pk-yrs)     Types: Cigarettes     Start date: 4/27/1980    Smokeless tobacco: Never    Tobacco comments:     Pt refused smoking cessation   Vaping Use    Vaping status: Never Used   Substance Use Topics    Alcohol use: Yes     Alcohol/week: 28.0 standard drinks of alcohol     Types: 28 Shots of liquor per week     Comment: drinks fifth of alcohol during week    Drug use: Not Currently     Types: Marijuana     Comment: daily       MEDICATIONS:    Current Outpatient Medications:     albuterol (PROVENTIL HFA,VENTOLIN HFA) 90 mcg/act inhaler, Inhale 2 puffs every 6 (six) hours as needed for wheezing, Disp: , Rfl:     amLODIPine (NORVASC) 5 mg tablet, TAKE ONE TABLET BY MOUTH EVERY DAY, Disp: 30 tablet, Rfl: 5    Misc Natural Products (Osteo Bi-Flex Joint Shield) TABS, Take by mouth in the morning, Disp: , Rfl:     Multiple Vitamins-Minerals (multivitamin with minerals) tablet, Take 1 tablet by mouth daily, Disp: , Rfl:     Naproxen Sodium (ALEVE PO), Take by mouth 2 (two) times a day, Disp: , Rfl:     Omega-3 Fatty Acids (Omega-3 Fish Oil) 1000 MG CAPS, Take by mouth in the morning, Disp: , Rfl:     selenium 50 MCG TABS, Take 50 mcg by mouth daily, Disp: , Rfl:     sildenafil (VIAGRA) 100 mg tablet, TAKE ONE TABLET BY MOUTH IF NEEDED FOR ERECTILE DYSFUNCTION. TAKE ON AN EMPTY STOMACH 1 HOUR PRIOR TO SEXUAL ACTIVITY. NO ALCOHOL, Disp: 30 tablet, Rfl: 3    Diclofenac Sodium (VOLTAREN) 1 %, Apply 2 g topically 4 (four) times a day for 7 days, Disp: 56 g, Rfl: 0    ALLERGIES:  No Known Allergies    REVIEW OF SYSTEMS:  Pertinent items are noted in HPI.  A comprehensive review of systems was negative.    LABS:  HgA1c:   Lab Results   Component Value Date    HGBA1C 5.7 (H) 05/14/2014     BMP:   Lab Results   Component Value Date    GLUCOSE 103 05/14/2014    CALCIUM 9.6 06/01/2023     05/14/2014    K 3.8 06/01/2023    CO2 25 06/01/2023     06/01/2023    BUN 12  "06/01/2023    CREATININE 1.11 06/01/2023           _____________________________________________________  PHYSICAL EXAMINATION:  Vital signs: /84   Ht 6' 1\" (1.854 m)   Wt 80.3 kg (177 lb)   BMI 23.35 kg/m²   General: well developed and well nourished, alert, oriented times 3, and appears comfortable  Psychiatric: Normal  HEENT: Trachea Midline, No torticollis  Cardiovascular: No discernable arrhythmia  Pulmonary: No wheezing or stridor  Abdomen: No rebound or guarding  Extremities: No peripheral edema  Skin: No masses, erythema, lacerations, fluctation, ulcerations  Neurovascular: Sensation Intact to the Median, Ulnar, Radial Nerve, Motor Intact to the Median, Ulnar, Radial Nerve, and Pulses Intact    MUSCULOSKELETAL EXAMINATION:    BILATERAL SIDE: Radiocarpal joint effusions, radiocarpal joint tenderness, approx. 30 degrees of extension and 45 degrees of flexion   Good composite fist formation noted.  Utilizing both hands to put splints on.  Small compressive wrap noticed today around his left wrist.  No signs of infection noted.  _____________________________________________________  STUDIES REVIEWED:  No Studies to review      PROCEDURES PERFORMED:  Medium joint arthrocentesis: bilateral radiocarpal  Universal Protocol:  Consent: Verbal consent obtained.  Risks and benefits: risks, benefits and alternatives were discussed  Consent given by: patient  Patient understanding: patient states understanding of the procedure being performed  Patient consent: the patient's understanding of the procedure matches consent given  Site marked: the operative site was marked  Patient identity confirmed: verbally with patient  Supporting Documentation  Indications: pain   Procedure Details  Location: wrist - bilateral radiocarpal  Preparation: Patient was prepped and draped in the usual sterile fashion  Needle size: 25 G  Approach: dorsal    Medications (Right): 40 mg triamcinolone acetonide 40 mg/mL; 10 mL ropivacaine " 0.5 %Medications (Left): 40 mg triamcinolone acetonide 40 mg/mL; 10 mL ropivacaine 0.5 %   Patient tolerance: patient tolerated the procedure well with no immediate complications  Dressing:  Sterile dressing applied

## 2024-06-22 DIAGNOSIS — N52.9 ERECTILE DYSFUNCTION, UNSPECIFIED ERECTILE DYSFUNCTION TYPE: ICD-10-CM

## 2024-06-22 DIAGNOSIS — C61 PROSTATE CANCER (HCC): ICD-10-CM

## 2024-06-25 NOTE — TELEPHONE ENCOUNTER
LVM to try and schedule FU apt with a AP in the [place] office to renew medication.  Asked him to give us a call to get scheduled

## 2024-06-27 RX ORDER — SILDENAFIL 100 MG/1
TABLET, FILM COATED ORAL
Qty: 30 TABLET | Refills: 3 | Status: SHIPPED | OUTPATIENT
Start: 2024-06-27

## 2024-06-27 NOTE — TELEPHONE ENCOUNTER
2ND ATTEMPT:    Called the patent and left a VM, we received a medication refill request from [facility], in order for us to refill this medication a follow-up appointment will need to be scheduled with an AP, please call us at [phone] to schedule an appointment thank-you.

## 2024-07-05 NOTE — TELEPHONE ENCOUNTER
3RD ATTEMPT    Called patient to set up appt due to medication request. LVM for patient to return call.

## 2024-08-12 ENCOUNTER — OFFICE VISIT (OUTPATIENT)
Dept: OBGYN CLINIC | Facility: CLINIC | Age: 69
End: 2024-08-12
Payer: MEDICARE

## 2024-08-12 VITALS — BODY MASS INDEX: 22.93 KG/M2 | HEIGHT: 73 IN | WEIGHT: 173 LBS

## 2024-08-12 DIAGNOSIS — M19.132 SLAC (SCAPHOLUNATE ADVANCED COLLAPSE) OF WRIST, LEFT: Primary | ICD-10-CM

## 2024-08-12 DIAGNOSIS — M19.131 SLAC (SCAPHOLUNATE ADVANCED COLLAPSE) OF WRIST, RIGHT: ICD-10-CM

## 2024-08-12 PROCEDURE — 99213 OFFICE O/P EST LOW 20 MIN: CPT | Performed by: ORTHOPAEDIC SURGERY

## 2024-08-12 PROCEDURE — 20605 DRAIN/INJ JOINT/BURSA W/O US: CPT

## 2024-08-12 RX ORDER — ROPIVACAINE HYDROCHLORIDE 2 MG/ML
1 INJECTION, SOLUTION EPIDURAL; INFILTRATION; PERINEURAL
Status: COMPLETED | OUTPATIENT
Start: 2024-08-12 | End: 2024-08-12

## 2024-08-12 RX ORDER — TRIAMCINOLONE ACETONIDE 40 MG/ML
40 INJECTION, SUSPENSION INTRA-ARTICULAR; INTRAMUSCULAR
Status: COMPLETED | OUTPATIENT
Start: 2024-08-12 | End: 2024-08-12

## 2024-08-12 RX ADMIN — ROPIVACAINE HYDROCHLORIDE 1 ML: 2 INJECTION, SOLUTION EPIDURAL; INFILTRATION; PERINEURAL at 11:30

## 2024-08-12 RX ADMIN — TRIAMCINOLONE ACETONIDE 40 MG: 40 INJECTION, SUSPENSION INTRA-ARTICULAR; INTRAMUSCULAR at 11:30

## 2024-08-12 NOTE — PROGRESS NOTES
ASSESSMENT/PLAN:    Assessment:   Bilateral SLAC wrist arthritis  Current smoker     Plan:   Patient was provided bilateral wrist cortisone injections with Kenalog today.  He tolerated well.  He was advised that we can repeat the cortisone injections every 3 to 4 months as needed for pain  He will follow-up in 3 months for reevaluation    Follow Up:  3 months    To Do Next Visit:  Bilateral radiocarpal joint CSI's using Kenalog     _____________________________________________________  CHIEF COMPLAINT:  Chief Complaint   Patient presents with    Left Wrist - Follow-up     SLAC - Kenalog     Right Wrist - Follow-up     SLAC - Kenalog          SUBJECTIVE:  Vincent Clemons is a 68 y.o. male who presents for follow up regarding bilateral SLAC wrist.  Since last visit, Vincent Clemons has tried steroid injections with excellent relief.  Today there is pain to the dorsal aspect of both wrists.  He is taking Aleve and using Voltaren Gel for pain control.  These do help, but not significantly.  He states that he would like to proceed with cortisone injections today.      PAST MEDICAL HISTORY:  Past Medical History:   Diagnosis Date    Allergic May 1 2022    Hayvever    Arthritis     both knees and wrists    Hypertension 10/01/20    Dr. Maradiaga    Pneumonia     Prostate cancer (HCC)        PAST SURGICAL HISTORY:  Past Surgical History:   Procedure Laterality Date    CARPAL TUNNEL RELEASE  2015    Both wrists    COLONOSCOPY      [place] PLMT INTERSTITIAL DEV RADIAT TX PROSTATE 1/MULT N/A 07/29/2021    Procedure: INSERTION OF FIDUCIAL MARKER (TRANSRECTAL ULTRASOUND GUIDANCE), SPACEOAR;  Surgeon: Fito Lynn MD;  Location: [facility];  Service: Urology    TONSILLECTOMY         FAMILY HISTORY:  Family History   Problem Relation Age of Onset    Breast cancer Mother 50    Substance Abuse Neg Hx     Mental illness Neg Hx     Colon cancer Neg Hx     Colon polyps Neg Hx        SOCIAL HISTORY:  Social History     Tobacco Use     Smoking status: Every Day     Current packs/day: 1.00     Average packs/day: 1 pack/day for 44.3 years (44.3 ttl pk-yrs)     Types: Cigarettes     Start date: 4/27/1980    Smokeless tobacco: Never    Tobacco comments:     Pt refused smoking cessation   Vaping Use    Vaping status: Never Used   Substance Use Topics    Alcohol use: Yes     Alcohol/week: 28.0 standard drinks of alcohol     Types: 28 Shots of liquor per week     Comment: drinks fifth of alcohol during week    Drug use: Not Currently     Types: Marijuana     Comment: daily       MEDICATIONS:    Current Outpatient Medications:     albuterol (PROVENTIL HFA,VENTOLIN HFA) 90 mcg/act inhaler, Inhale 2 puffs every 6 (six) hours as needed for wheezing, Disp: , Rfl:     amLODIPine (NORVASC) 5 mg tablet, TAKE ONE TABLET BY MOUTH EVERY DAY, Disp: 30 tablet, Rfl: 5    Misc Natural Products (Osteo Bi-Flex Joint Shield) TABS, Take by mouth in the morning, Disp: , Rfl:     Multiple Vitamins-Minerals (multivitamin with minerals) tablet, Take 1 tablet by mouth daily, Disp: , Rfl:     Naproxen Sodium (ALEVE PO), Take by mouth 2 (two) times a day, Disp: , Rfl:     Omega-3 Fatty Acids (Omega-3 Fish Oil) 1000 MG CAPS, Take by mouth in the morning, Disp: , Rfl:     selenium 50 MCG TABS, Take 50 mcg by mouth daily, Disp: , Rfl:     sildenafil (VIAGRA) 100 mg tablet, TAKE ONE TABLET BY MOUTH ON AN EMPTY STOMACH 1 HOUR PRIOR TO SEXUAL ACTIVITY IF NEEDED FOR ERECTILE DYSFUNCTION, Disp: 30 tablet, Rfl: 3    Diclofenac Sodium (VOLTAREN) 1 %, Apply 2 g topically 4 (four) times a day for 7 days, Disp: 56 g, Rfl: 0    ALLERGIES:  No Known Allergies    REVIEW OF SYSTEMS:  Pertinent items are noted in HPI.  A comprehensive review of systems was negative.    LABS:  HgA1c:   Lab Results   Component Value Date    HGBA1C 5.7 (H) 05/14/2014     BMP:   Lab Results   Component Value Date    GLUCOSE 103 05/14/2014    CALCIUM 9.6 06/01/2023     05/14/2014    K 3.8 06/01/2023    CO2 25  "06/01/2023     06/01/2023    BUN 12 06/01/2023    CREATININE 1.11 06/01/2023           _____________________________________________________  PHYSICAL EXAMINATION:  Vital signs: Ht 6' 1\" (1.854 m)   Wt 78.5 kg (173 lb)   BMI 22.82 kg/m²   General: well developed and well nourished, alert, oriented times 3, and appears comfortable  Psychiatric: Normal  HEENT: Trachea Midline, No torticollis  Cardiovascular: No discernable arrhythmia  Pulmonary: No wheezing or stridor  Abdomen: No rebound or guarding  Extremities: No peripheral edema  Skin: No masses, erythema, lacerations, fluctation, ulcerations  Neurovascular: Sensation Intact to the Median, Ulnar, Radial Nerve, Motor Intact to the Median, Ulnar, Radial Nerve, and Pulses Intact    MUSCULOSKELETAL EXAMINATION:    BILATERAL SIDE: Radiocarpal joint tenderness, approx. 30 degrees of extension and 45 degrees of flexion.  Swelling noted over the radiocarpal joints bilaterally.  Good composite fist formation noted.  No signs of infection noted.  _____________________________________________________  STUDIES REVIEWED:  No Studies to review      PROCEDURES PERFORMED:  Medium joint arthrocentesis: bilateral radiocarpal  Universal Protocol:  Consent: Verbal consent obtained.  Risks and benefits: risks, benefits and alternatives were discussed  Consent given by: patient  Patient understanding: patient states understanding of the procedure being performed  Patient consent: the patient's understanding of the procedure matches consent given  Site marked: the operative site was marked  Patient identity confirmed: verbally with patient  Supporting Documentation  Indications: pain   Procedure Details  Location: wrist - bilateral radiocarpal  Preparation: Patient was prepped and draped in the usual sterile fashion  Needle size: 25 G  Approach: dorsal    Medications (Right): 40 mg triamcinolone acetonide 40 mg/mL; 1 mL ropivacaine 0.2 %Medications (Left): 40 mg triamcinolone " acetonide 40 mg/mL; 1 mL ropivacaine 0.2 %   Patient tolerance: patient tolerated the procedure well with no immediate complications  Dressing:  Sterile dressing applied        Scribe Attestation      I,:  Lissette Quinteros PA-C am acting as a scribe while in the presence of the attending physician.:       I,:  Bryson Esparza MD personally performed the services described in this documentation    as scribed in my presence.:           Scribe Attestation      I,:  Lissette Quinteros PA-C am acting as a scribe while in the presence of the attending physician.:       I,:  Bryson Esparza MD personally performed the services described in this documentation    as scribed in my presence.:

## 2024-08-20 DIAGNOSIS — I10 ESSENTIAL HYPERTENSION: ICD-10-CM

## 2024-08-21 RX ORDER — AMLODIPINE BESYLATE 5 MG/1
5 TABLET ORAL DAILY
Qty: 30 TABLET | Refills: 5 | Status: SHIPPED | OUTPATIENT
Start: 2024-08-21

## 2024-09-11 ENCOUNTER — OFFICE VISIT (OUTPATIENT)
Dept: FAMILY MEDICINE CLINIC | Facility: CLINIC | Age: 69
End: 2024-09-11
Payer: MEDICARE

## 2024-09-11 VITALS
HEIGHT: 73 IN | HEART RATE: 83 BPM | BODY MASS INDEX: 23.25 KG/M2 | TEMPERATURE: 98 F | DIASTOLIC BLOOD PRESSURE: 86 MMHG | SYSTOLIC BLOOD PRESSURE: 130 MMHG | WEIGHT: 175.4 LBS | OXYGEN SATURATION: 98 %

## 2024-09-11 DIAGNOSIS — M17.0 PRIMARY OSTEOARTHRITIS OF BOTH KNEES: ICD-10-CM

## 2024-09-11 DIAGNOSIS — Z12.2 ENCOUNTER FOR SCREENING FOR LUNG CANCER: Primary | ICD-10-CM

## 2024-09-11 DIAGNOSIS — I10 ESSENTIAL HYPERTENSION: ICD-10-CM

## 2024-09-11 DIAGNOSIS — C61 PROSTATE CANCER (HCC): ICD-10-CM

## 2024-09-11 DIAGNOSIS — F17.210 SMOKING GREATER THAN 20 PACK YEARS: ICD-10-CM

## 2024-09-11 DIAGNOSIS — Z00.00 MEDICARE ANNUAL WELLNESS VISIT, SUBSEQUENT: ICD-10-CM

## 2024-09-11 PROCEDURE — G0439 PPPS, SUBSEQ VISIT: HCPCS | Performed by: FAMILY MEDICINE

## 2024-09-11 PROCEDURE — 99214 OFFICE O/P EST MOD 30 MIN: CPT | Performed by: FAMILY MEDICINE

## 2024-09-11 NOTE — PATIENT INSTRUCTIONS
Medicare Preventive Visit Patient Instructions  Thank you for completing your Welcome to Medicare Visit or Medicare Annual Wellness Visit today. Your next wellness visit will be due in one year (9/12/2025).  The screening/preventive services that you may require over the next 5-10 years are detailed below. Some tests may not apply to you based off risk factors and/or age. Screening tests ordered at today's visit but not completed yet may show as past due. Also, please note that scanned in results may not display below.  Preventive Screenings:  Service Recommendations Previous Testing/Comments   Colorectal Cancer Screening  Colonoscopy    Fecal Occult Blood Test (FOBT)/Fecal Immunochemical Test (FIT)  Fecal DNA/Cologuard Test  Flexible Sigmoidoscopy Age: 45-75 years old   Colonoscopy: every 10 years (May be performed more frequently if at higher risk)  OR  FOBT/FIT: every 1 year  OR  Cologuard: every 3 years  OR  Sigmoidoscopy: every 5 years  Screening may be recommended earlier than age 45 if at higher risk for colorectal cancer. Also, an individualized decision between you and your healthcare provider will decide whether screening between the ages of 76-85 would be appropriate. Colonoscopy: 10/16/2023  FOBT/FIT: Not on file  Cologuard: 04/28/2022  Sigmoidoscopy: Not on file          Prostate Cancer Screening Individualized decision between patient and health care provider in men between ages of 55-69   Medicare will cover every 12 months beginning on the day after your 50th birthday PSA: 0.58 ng/mL           Hepatitis C Screening Once for adults born between 1945 and 1965  More frequently in patients at high risk for Hepatitis C Hep C Antibody: 06/15/2022        Diabetes Screening 1-2 times per year if you're at risk for diabetes or have pre-diabetes Fasting glucose: 97 mg/dL (6/1/2023)  A1C: No results in last 5 years (No results in last 5 years)      Cholesterol Screening Once every 5 years if you don't have a  lipid disorder. May order more often based on risk factors. Lipid panel: 12/08/2020         Other Preventive Screenings Covered by Medicare:  Abdominal Aortic Aneurysm (AAA) Screening: covered once if your at risk. You're considered to be at risk if you have a family history of AAA or a male between the age of 65-75 who smoking at least 100 cigarettes in your lifetime.  Lung Cancer Screening: covers low dose CT scan once per year if you meet all of the following conditions: (1) Age 55-77; (2) No signs or symptoms of lung cancer; (3) Current smoker or have quit smoking within the last 15 years; (4) You have a tobacco smoking history of at least 20 pack years (packs per day x number of years you smoked); (5) You get a written order from a healthcare provider.  Glaucoma Screening: covered annually if you're considered high risk: (1) You have diabetes OR (2) Family history of glaucoma OR (3)  aged 50 and older OR (4)  American aged 65 and older  Osteoporosis Screening: covered every 2 years if you meet one of the following conditions: (1) Have a vertebral abnormality; (2) On glucocorticoid therapy for more than 3 months; (3) Have primary hyperparathyroidism; (4) On osteoporosis medications and need to assess response to drug therapy.  HIV Screening: covered annually if you're between the age of 15-65. Also covered annually if you are younger than 15 and older than 65 with risk factors for HIV infection. For pregnant patients, it is covered up to 3 times per pregnancy.    Immunizations:  Immunization Recommendations   Influenza Vaccine Annual influenza vaccination during flu season is recommended for all persons aged >= 6 months who do not have contraindications   Pneumococcal Vaccine   * Pneumococcal conjugate vaccine = PCV13 (Prevnar 13), PCV15 (Vaxneuvance), PCV20 (Prevnar 20)  * Pneumococcal polysaccharide vaccine = PPSV23 (Pneumovax) Adults 19-65 yo with certain risk factors or if 65+ yo  If  never received any pneumonia vaccine: recommend Prevnar 20 (PCV20)  Give PCV20 if previously received 1 dose of PCV13 or PPSV23   Hepatitis B Vaccine 3 dose series if at intermediate or high risk (ex: diabetes, end stage renal disease, liver disease)   Respiratory syncytial virus (RSV) Vaccine - COVERED BY MEDICARE PART D  * RSVPreF3 (Arexvy) CDC recommends that adults 60 years of age and older may receive a single dose of RSV vaccine using shared clinical decision-making (SCDM)   Tetanus (Td) Vaccine - COST NOT COVERED BY MEDICARE PART B Following completion of primary series, a booster dose should be given every 10 years to maintain immunity against tetanus. Td may also be given as tetanus wound prophylaxis.   Tdap Vaccine - COST NOT COVERED BY MEDICARE PART B Recommended at least once for all adults. For pregnant patients, recommended with each pregnancy.   Shingles Vaccine (Shingrix) - COST NOT COVERED BY MEDICARE PART B  2 shot series recommended in those 19 years and older who have or will have weakened immune systems or those 50 years and older     Health Maintenance Due:      Topic Date Due   • Lung Cancer Screening  09/13/2023   • Colorectal Cancer Screening  10/15/2026   • Hepatitis C Screening  Completed     Immunizations Due:      Topic Date Due   • COVID-19 Vaccine (6 - 2023-24 season) 09/01/2024   • Influenza Vaccine (1) 09/01/2024     Advance Directives   What are advance directives?  Advance directives are legal documents that state your wishes and plans for medical care. These plans are made ahead of time in case you lose your ability to make decisions for yourself. Advance directives can apply to any medical decision, such as the treatments you want, and if you want to donate organs.   What are the types of advance directives?  There are many types of advance directives, and each state has rules about how to use them. You may choose a combination of any of the following:  Living will:  This is a  written record of the treatment you want. You can also choose which treatments you do not want, which to limit, and which to stop at a certain time. This includes surgery, medicine, IV fluid, and tube feedings.   Durable power of  for healthcare (DPAHC):  This is a written record that states who you want to make healthcare choices for you when you are unable to make them for yourself. This person, called a proxy, is usually a family member or a friend. You may choose more than 1 proxy.  Do not resuscitate (DNR) order:  A DNR order is used in case your heart stops beating or you stop breathing. It is a request not to have certain forms of treatment, such as CPR. A DNR order may be included in other types of advance directives.  Medical directive:  This covers the care that you want if you are in a coma, near death, or unable to make decisions for yourself. You can list the treatments you want for each condition. Treatment may include pain medicine, surgery, blood transfusions, dialysis, IV or tube feedings, and a ventilator (breathing machine).  Values history:  This document has questions about your views, beliefs, and how you feel and think about life. This information can help others choose the care that you would choose.  Why are advance directives important?  An advance directive helps you control your care. Although spoken wishes may be used, it is better to have your wishes written down. Spoken wishes can be misunderstood, or not followed. Treatments may be given even if you do not want them. An advance directive may make it easier for your family to make difficult choices about your care.   Cigarette Smoking and Your Health   Risks to your health if you smoke:  Nicotine and other chemicals found in tobacco damage every cell in your body. Even if you are a light smoker, you have an increased risk for cancer, heart disease, and lung disease. If you are pregnant or have diabetes, smoking increases your  risk for complications.   Benefits to your health if you stop smoking:   You decrease respiratory symptoms such as coughing, wheezing, and shortness of breath.   You reduce your risk for cancers of the lung, mouth, throat, kidney, bladder, pancreas, stomach, and cervix. If you already have cancer, you increase the benefits of chemotherapy. You also reduce your risk for cancer returning or a second cancer from developing.   You reduce your risk for heart disease, blood clots, heart attack, and stroke.   You reduce your risk for lung infections, and diseases such as pneumonia, asthma, chronic bronchitis, and emphysema.  Your circulation improves. More oxygen can be delivered to your body. If you have diabetes, you lower your risk for complications, such as kidney, artery, and eye diseases. You also lower your risk for nerve damage. Nerve damage can lead to amputations, poor vision, and blindness.  You improve your body's ability to heal and to fight infections.  For more information and support to stop smoking:   Smokefree.gov  Phone: 5- 125 - 672-8983  Web Address: www.[employer]     © Copyright [employer] 2018 Information is for End User's use only and may not be sold, redistributed or otherwise used for commercial purposes. All illustrations and images included in CareNotes® are the copyrighted property of [employer].A[employer]M., Inc[employer] or [employer]

## 2024-09-11 NOTE — PROGRESS NOTES
Ambulatory Visit  Name: Vincent Clemons      : 1955      MRN: [id number]  Encounter Provider: Henrry Leonardo MD  Encounter Date: 2024   Encounter department: [facility]    Assessment & Plan  Medicare annual wellness visit, subsequent         Essential hypertension         Primary osteoarthritis of both knees         Prostate cancer (HCC)           Depression Screening and Follow-up Plan: Patient was screened for depression during today's encounter. They screened negative with a PHQ-2 score of 0.    Tobacco Cessation Counseling: The patient is sincerely urged to quit consumption of tobacco. He is not ready to quit tobacco.       Preventive health issues were discussed with patient, and age appropriate screening tests were ordered as noted in patient's After Visit Summary. Personalized health advice and appropriate referrals for health education or preventive services given if needed, as noted in patient's After Visit Summary.    History of Present Illness     HPI   Patient Care Team:  Henrry Leonardo MD as PCP - General (Family Medicine)  Mateo Maradiaga MD (Family Medicine)  Andre Tanner MD (Radiation Oncology)    Review of Systems   Constitutional:  Negative for fatigue, fever and unexpected weight change.   HENT:  Negative for congestion, sinus pain and sore throat.    Eyes:  Negative for visual disturbance.   Respiratory:  Negative for shortness of breath and wheezing.    Cardiovascular:  Negative for chest pain and palpitations.   Gastrointestinal:  Negative for abdominal pain, nausea and vomiting.   Musculoskeletal: Negative.  Negative for arthralgias and myalgias.   Neurological:  Negative for syncope, weakness and numbness.   Psychiatric/Behavioral: Negative.  Negative for confusion, dysphoric mood and suicidal ideas.      Medical History Reviewed by provider this encounter:       Annual Wellness Visit Questionnaire   Vincent is here for his  Subsequent Wellness visit. Last Medicare Wellness visit information reviewed, patient interviewed and updates made to the record today.      Health Risk Assessment:   Patient rates overall health as good. Patient feels that their physical health rating is slightly better. Patient is very satisfied with their life. Eyesight was rated as same. Hearing was rated as same. Patient feels that their emotional and mental health rating is same. Patients states they are never, rarely angry. Patient states they are never, rarely unusually tired/fatigued. Pain experienced in the last 7 days has been some. Patient's pain rating has been 4/10. Patient states that he has experienced no weight loss or gain in last 6 months.     Depression Screening:   PHQ-2 Score: 0  PHQ-9 Score: 0      Fall Risk Screening:   In the past year, patient has experienced: no history of falling in past year      Home Safety:  Patient does not have trouble with stairs inside or outside of their home. Patient has working smoke alarms and has no working carbon monoxide detector. Home safety hazards include: none.     Nutrition:   Current diet is Regular.     Medications:   Patient is currently taking over-the-counter supplements. OTC medications include: see medication list. Patient is able to manage medications.     Activities of Daily Living (ADLs)/Instrumental Activities of Daily Living (IADLs):   Walk and transfer into and out of bed and chair?: Yes  Dress and groom yourself?: Yes    Bathe or shower yourself?: Yes    Feed yourself? Yes  Do your laundry/housekeeping?: Yes  Manage your money, pay your bills and track your expenses?: Yes  Make your own meals?: Yes    Do your own shopping?: Yes    Previous Hospitalizations:   Any hospitalizations or ED visits within the last 12 months?: Yes    How many hospitalizations have you had in the last year?: 1-2    Advance Care Planning:   Living will: Yes    Durable POA for healthcare: Yes    Advanced directive:  Yes    Provider agrees with end of life decisions: Yes      Cognitive Screening:   Provider or family/friend/caregiver concerned regarding cognition?: No    PREVENTIVE SCREENINGS      Cardiovascular Screening:    General: Screening Current      Colorectal Cancer Screening:     General: Screening Current      Prostate Cancer Screening:    General: History Prostate Cancer      Osteoporosis Screening:    General: Screening Not Indicated      Abdominal Aortic Aneurysm (AAA) Screening:    Risk factors include: age between 65-74 yo and tobacco use        Lung Cancer Screening:     General: Risks and Benefits Discussed      Hepatitis C Screening:    General: Screening Current    Screening, Brief Intervention, and Referral to Treatment (SBIRT)    Screening  Typical number of drinks in a day: 0  Typical number of drinks in a week: 15  Interpretation: Low risk drinking behavior.    Single Item Drug Screening:  How often have you used an illegal drug (including marijuana) or a prescription medication for non-medical reasons in the past year? never    Single Item Drug Screen Score: 0  Interpretation: Negative screen for possible drug use disorder    Social Determinants of Health     Financial Resource Strain: Low Risk  (9/11/2024)    Overall Financial Resource Strain (CARDIA)     Difficulty of Paying Living Expenses: Not hard at all   Food Insecurity: No Food Insecurity (9/11/2024)    Hunger Vital Sign     Worried About Running Out of Food in the Last Year: Never true     Ran Out of Food in the Last Year: Never true   Transportation Needs: No Transportation Needs (9/11/2024)    PRAPARE - Transportation     Lack of Transportation (Medical): No     Lack of Transportation (Non-Medical): No   Housing Stability: Unknown (9/11/2024)    Housing Stability Vital Sign     Unable to Pay for Housing in the Last Year: No     Homeless in the Last Year: No   Utilities: Not At Risk (9/11/2024)    [facility] Utilities     Threatened with loss of  "utilities: No     No results found.    Objective     /86 (BP Location: Right arm, Patient Position: Sitting, Cuff Size: Standard)   Pulse 83   Temp 98 °F (36.7 °C) (Tympanic)   Ht 6' 1\" (1.854 m)   Wt 79.6 kg (175 lb 6.4 oz)   SpO2 98%   BMI 23.14 kg/m²     Physical Exam  Neck:      Thyroid: No thyromegaly.   Cardiovascular:      Rate and Rhythm: Normal rate and regular rhythm.   Pulmonary:      Effort: Pulmonary effort is normal.      Breath sounds: Normal breath sounds.   Abdominal:      Palpations: Abdomen is soft. There is no mass.      Tenderness: There is no abdominal tenderness.   Musculoskeletal:         General: Normal range of motion.      Cervical back: Normal range of motion and neck supple.   Lymphadenopathy:      Cervical: No cervical adenopathy.         "

## 2024-09-12 ENCOUNTER — APPOINTMENT (OUTPATIENT)
Dept: LAB | Facility: HOSPITAL | Age: 69
End: 2024-09-12
Payer: MEDICARE

## 2024-09-12 DIAGNOSIS — I10 ESSENTIAL HYPERTENSION: ICD-10-CM

## 2024-09-12 DIAGNOSIS — C61 PROSTATE CANCER (HCC): ICD-10-CM

## 2024-09-12 LAB
ALBUMIN SERPL BCG-MCNC: 4.1 G/DL (ref 3.5–5)
ALP SERPL-CCNC: 45 U/L (ref 34–104)
ALT SERPL W P-5'-P-CCNC: 13 U/L (ref 7–52)
ANION GAP SERPL CALCULATED.3IONS-SCNC: 7 MMOL/L (ref 4–13)
AST SERPL W P-5'-P-CCNC: 20 U/L (ref 13–39)
BILIRUB SERPL-MCNC: 0.69 MG/DL (ref 0.2–1)
BUN SERPL-MCNC: 7 MG/DL (ref 5–25)
CALCIUM SERPL-MCNC: 9.1 MG/DL (ref 8.4–10.2)
CHLORIDE SERPL-SCNC: 105 MMOL/L (ref 96–108)
CHOLEST SERPL-MCNC: 185 MG/DL
CO2 SERPL-SCNC: 27 MMOL/L (ref 21–32)
CREAT SERPL-MCNC: 1.08 MG/DL (ref 0.6–1.3)
GFR SERPL CREATININE-BSD FRML MDRD: 69 ML/MIN/1.73SQ M
GLUCOSE P FAST SERPL-MCNC: 99 MG/DL (ref 65–99)
HDLC SERPL-MCNC: 55 MG/DL
LDLC SERPL CALC-MCNC: 113 MG/DL (ref 0–100)
POTASSIUM SERPL-SCNC: 4.1 MMOL/L (ref 3.5–5.3)
PROT SERPL-MCNC: 6.6 G/DL (ref 6.4–8.4)
PSA FREE MFR SERPL: 10 %
PSA FREE SERPL-MCNC: 0.06 NG/ML
PSA SERPL-MCNC: 0.6 NG/ML (ref 0–4)
SODIUM SERPL-SCNC: 139 MMOL/L (ref 135–147)
TRIGL SERPL-MCNC: 86 MG/DL

## 2024-09-12 PROCEDURE — 36415 COLL VENOUS BLD VENIPUNCTURE: CPT

## 2024-09-12 PROCEDURE — 80053 COMPREHEN METABOLIC PANEL: CPT

## 2024-09-12 PROCEDURE — 84154 ASSAY OF PSA FREE: CPT

## 2024-09-12 PROCEDURE — 80061 LIPID PANEL: CPT

## 2024-09-12 PROCEDURE — 84153 ASSAY OF PSA TOTAL: CPT

## 2024-10-31 ENCOUNTER — TELEPHONE (OUTPATIENT)
Age: 69
End: 2024-10-31

## 2024-10-31 NOTE — TELEPHONE ENCOUNTER
Pt's wife called because she misplaced the CT lung script Dr. Leonardo gave to pt when it was ordered on 9/11 and wanted to get another one. I advised her the order is still active in pt's chart and can call Central Scheduling to schedule the appt which I provided the phone # for.

## 2024-11-08 ENCOUNTER — HOSPITAL ENCOUNTER (OUTPATIENT)
Dept: CT IMAGING | Facility: HOSPITAL | Age: 69
Discharge: HOME | End: 2024-11-08

## 2024-11-11 ENCOUNTER — OFFICE VISIT (OUTPATIENT)
Dept: OBGYN CLINIC | Facility: CLINIC | Age: 69
End: 2024-11-11
Payer: MEDICARE

## 2024-11-11 VITALS — BODY MASS INDEX: 23.19 KG/M2 | WEIGHT: 175 LBS | HEIGHT: 73 IN

## 2024-11-11 DIAGNOSIS — M19.131 SLAC (SCAPHOLUNATE ADVANCED COLLAPSE) OF WRIST, RIGHT: ICD-10-CM

## 2024-11-11 DIAGNOSIS — M19.132 SLAC (SCAPHOLUNATE ADVANCED COLLAPSE) OF WRIST, LEFT: Primary | ICD-10-CM

## 2024-11-11 DIAGNOSIS — M19.90 ARTHRITIS: ICD-10-CM

## 2024-11-11 PROCEDURE — 99213 OFFICE O/P EST LOW 20 MIN: CPT | Performed by: ORTHOPAEDIC SURGERY

## 2024-11-11 PROCEDURE — 20605 DRAIN/INJ JOINT/BURSA W/O US: CPT | Performed by: ORTHOPAEDIC SURGERY

## 2024-11-11 RX ORDER — TRIAMCINOLONE ACETONIDE 40 MG/ML
40 INJECTION, SUSPENSION INTRA-ARTICULAR; INTRAMUSCULAR
Status: COMPLETED | OUTPATIENT
Start: 2024-11-11 | End: 2024-11-11

## 2024-11-11 RX ORDER — LIDOCAINE HYDROCHLORIDE 10 MG/ML
1 INJECTION, SOLUTION INFILTRATION; PERINEURAL
Status: COMPLETED | OUTPATIENT
Start: 2024-11-11 | End: 2024-11-11

## 2024-11-11 RX ADMIN — LIDOCAINE HYDROCHLORIDE 1 ML: 10 INJECTION, SOLUTION INFILTRATION; PERINEURAL at 11:45

## 2024-11-11 RX ADMIN — TRIAMCINOLONE ACETONIDE 40 MG: 40 INJECTION, SUSPENSION INTRA-ARTICULAR; INTRAMUSCULAR at 11:45

## 2024-11-11 NOTE — PROGRESS NOTES
ASSESSMENT/PLAN:    Assessment:   Bilateral SLAC wrist arthritis  Current smoker     Plan:   Patient was provided bilateral wrist cortisone injections with Kenalog today.  He tolerated well.  He was advised that we can repeat the cortisone injections every 3 to 4 months as needed for pain  He will follow-up in 3 months for reevaluation    Follow Up:  3 months    To Do Next Visit:  Bilateral radiocarpal joint CSI's using Kenalog     _____________________________________________________  CHIEF COMPLAINT:  Chief Complaint   Patient presents with    Left Wrist - Follow-up     SLAC - Kenalog     Right Wrist - Follow-up     SLAC - Kenalog          SUBJECTIVE:  Vincent Clemons is a 69 y.o. male who presents for follow up regarding bilateral SLAC wrist.  Since last visit, Vincent Clemons has tried steroid injections with excellent relief. Today there is pain to the dorsal aspect of both wrists.  He is taking Aleve and using Voltaren Gel for pain control. He does use bracing for both of his wrist. These do help, but not significantly.  He states that he would like to proceed with cortisone injections today.      PAST MEDICAL HISTORY:  Past Medical History:   Diagnosis Date    Allergic May 1 2022    [facility]    Arthritis     both knees and wrists    Hypertension 10/01/20    Dr. Maradiaga    Pneumonia     Prostate cancer (HCC)        PAST SURGICAL HISTORY:  Past Surgical History:   Procedure Laterality Date    CARPAL TUNNEL RELEASE  2015    Both wrists    COLONOSCOPY      EYE SURGERY  01/01/2024    SC PLMT INTERSTITIAL DEV RADIAT TX PROSTATE 1/MULT N/A 07/29/2021    Procedure: INSERTION OF FIDUCIAL MARKER (TRANSRECTAL ULTRASOUND GUIDANCE), SPACEOAR;  Surgeon: Fito Lynn MD;  Location: [facility];  Service: Urology    TONSILLECTOMY         FAMILY HISTORY:  Family History   Problem Relation Age of Onset    Breast cancer Mother 50    Substance Abuse Neg Hx     Mental illness Neg Hx     Colon cancer Neg Hx     Colon polyps Neg  Hx        SOCIAL HISTORY:  Social History     Tobacco Use    Smoking status: Every Day     Current packs/day: 1.00     Average packs/day: 1 pack/day for 44.5 years (44.5 ttl pk-yrs)     Types: Cigarettes     Start date: 4/27/1980    Smokeless tobacco: Never    Tobacco comments:     Pt refused smoking cessation   Vaping Use    Vaping status: Never Used   Substance Use Topics    Alcohol use: Yes     Alcohol/week: 28.0 standard drinks of alcohol     Types: 28 Shots of liquor per week     Comment: drinks fifth of alcohol during week    Drug use: Not Currently     Types: Marijuana     Comment: daily       MEDICATIONS:    Current Outpatient Medications:     albuterol (PROVENTIL HFA,VENTOLIN HFA) 90 mcg/act inhaler, Inhale 2 puffs every 6 (six) hours as needed for wheezing, Disp: , Rfl:     amLODIPine (NORVASC) 5 mg tablet, TAKE ONE TABLET BY MOUTH EVERY DAY, Disp: 30 tablet, Rfl: 5    Misc Natural Products (Osteo Bi-Flex Joint Shield) TABS, Take by mouth in the morning, Disp: , Rfl:     Multiple Vitamins-Minerals (multivitamin with minerals) tablet, Take 1 tablet by mouth daily, Disp: , Rfl:     Naproxen Sodium (ALEVE PO), Take by mouth 2 (two) times a day, Disp: , Rfl:     Omega-3 Fatty Acids (Omega-3 Fish Oil) 1000 MG CAPS, Take by mouth in the morning, Disp: , Rfl:     selenium 50 MCG TABS, Take 50 mcg by mouth daily, Disp: , Rfl:     sildenafil (VIAGRA) 100 mg tablet, TAKE ONE TABLET BY MOUTH ON AN EMPTY STOMACH 1 HOUR PRIOR TO SEXUAL ACTIVITY IF NEEDED FOR ERECTILE DYSFUNCTION, Disp: 30 tablet, Rfl: 3    Diclofenac Sodium (VOLTAREN) 1 %, Apply 2 g topically 4 (four) times a day for 7 days, Disp: 56 g, Rfl: 0    ALLERGIES:  No Known Allergies    REVIEW OF SYSTEMS:  Pertinent items are noted in HPI.  A comprehensive review of systems was negative.    LABS:  HgA1c:   Lab Results   Component Value Date    HGBA1C 5.7 (H) 05/14/2014     BMP:   Lab Results   Component Value Date    GLUCOSE 103 05/14/2014    CALCIUM 9.1  "09/12/2024     05/14/2014    K 4.1 09/12/2024    CO2 27 09/12/2024     09/12/2024    BUN 7 09/12/2024    CREATININE 1.08 09/12/2024           _____________________________________________________  PHYSICAL EXAMINATION:  Vital signs: Ht 6' 1\" (1.854 m)   Wt 79.4 kg (175 lb)   BMI 23.09 kg/m²   General: well developed and well nourished, alert, oriented times 3, and appears comfortable  Psychiatric: Normal  HEENT: Trachea Midline, No torticollis  Cardiovascular: No discernable arrhythmia  Pulmonary: No wheezing or stridor  Abdomen: No rebound or guarding  Extremities: No peripheral edema  Skin: No masses, erythema, lacerations, fluctation, ulcerations  Neurovascular: Sensation Intact to the Median, Ulnar, Radial Nerve, Motor Intact to the Median, Ulnar, Radial Nerve, and Pulses Intact    MUSCULOSKELETAL EXAMINATION:    BILATERAL SIDE: Radiocarpal joint tenderness, approx. 30 degrees of extension and 45 degrees of flexion.  Swelling noted over the radiocarpal joints bilaterally.  Good composite fist formation noted.  No signs of infection noted. Radiocarpal effusion bilaterally.   _____________________________________________________  STUDIES REVIEWED:  No Studies to review      PROCEDURES PERFORMED:  Medium joint arthrocentesis: bilateral radiocarpal  Universal Protocol:  procedure performed by consultantRisks and benefits: risks, benefits and alternatives were discussed  Consent given by: patient  Patient understanding: patient states understanding of the procedure being performed  Site marked: the operative site was marked  Patient identity confirmed: verbally with patient  Supporting Documentation  Indications: pain   Procedure Details  Location: wrist - bilateral radiocarpal  Needle size: 22 G  Ultrasound guidance: no  Approach: dorsal    Medications (Right): 40 mg triamcinolone acetonide 40 mg/mL; 1 mL lidocaine 1 %Medications (Left): 40 mg triamcinolone acetonide 40 mg/mL; 1 mL lidocaine 1 % "   Patient tolerance: patient tolerated the procedure well with no immediate complications  Dressing:  Sterile dressing applied              Scribe Attestation      I,:  Petr Howard am acting as a scribe while in the presence of the attending physician.:       I,:  Bryson Esparza MD personally performed the services described in this documentation    as scribed in my presence.:                 Scribe Attestation      I,:  Petr Howard am acting as a scribe while in the presence of the attending physician.:       I,:  Bryson Esparza MD personally performed the services described in this documentation    as scribed in my presence.:

## 2024-11-14 ENCOUNTER — RESULTS FOLLOW-UP (OUTPATIENT)
Dept: FAMILY MEDICINE CLINIC | Facility: CLINIC | Age: 69
End: 2024-11-14

## 2024-12-18 NOTE — TELEPHONE ENCOUNTER
LVM stating appointment moved from 2/17 with Dr. Esparza to 2/24 to DAGOBERTO Da Silva.  Provided number if needed to change appointment.

## 2024-12-22 DIAGNOSIS — N52.9 ERECTILE DYSFUNCTION, UNSPECIFIED ERECTILE DYSFUNCTION TYPE: ICD-10-CM

## 2024-12-22 DIAGNOSIS — C61 PROSTATE CANCER (HCC): ICD-10-CM

## 2024-12-23 NOTE — TELEPHONE ENCOUNTER
Called the patient and left a VM, we received a medication refill request from [facility]. In order for us to provide a medication refill a follow up appt with AP is required. Please call us at [phone] to schedule, thank-you.

## 2024-12-27 NOTE — TELEPHONE ENCOUNTER
2ND ATTEMPT:    Called the patient and left a VM, we received a medication refill request from [facility]. In order for us to provide a medication refill a follow up appt with AP is required. Please call us at [phone] to schedule, thank-you.

## 2024-12-31 RX ORDER — SILDENAFIL 100 MG/1
TABLET, FILM COATED ORAL
Qty: 30 TABLET | Refills: 3 | Status: SHIPPED | OUTPATIENT
Start: 2024-12-31

## 2024-12-31 NOTE — TELEPHONE ENCOUNTER
3RD ATTEMPT:     Called the patient and left a VM, we received a medication refill request from [facility]. In order for us to provide a medication refill a follow up appt with AP is required. Please call us at [phone] to schedule, thank-you.     ATTEMPT TO REACH LETTER SENT

## 2025-02-24 ENCOUNTER — OFFICE VISIT (OUTPATIENT)
Dept: OBGYN CLINIC | Facility: CLINIC | Age: 70
End: 2025-02-24
Payer: MEDICARE

## 2025-02-24 VITALS — HEIGHT: 73 IN | BODY MASS INDEX: 23.11 KG/M2 | WEIGHT: 174.4 LBS

## 2025-02-24 DIAGNOSIS — M19.131 SLAC (SCAPHOLUNATE ADVANCED COLLAPSE) OF WRIST, RIGHT: ICD-10-CM

## 2025-02-24 DIAGNOSIS — M19.132 SLAC (SCAPHOLUNATE ADVANCED COLLAPSE) OF WRIST, LEFT: Primary | ICD-10-CM

## 2025-02-24 PROCEDURE — 20605 DRAIN/INJ JOINT/BURSA W/O US: CPT | Performed by: PHYSICIAN ASSISTANT

## 2025-02-24 PROCEDURE — 99213 OFFICE O/P EST LOW 20 MIN: CPT | Performed by: PHYSICIAN ASSISTANT

## 2025-02-24 RX ORDER — TRIAMCINOLONE ACETONIDE 40 MG/ML
40 INJECTION, SUSPENSION INTRA-ARTICULAR; INTRAMUSCULAR
Status: COMPLETED | OUTPATIENT
Start: 2025-02-24 | End: 2025-02-24

## 2025-02-24 RX ORDER — LIDOCAINE HYDROCHLORIDE 10 MG/ML
1 INJECTION, SOLUTION INFILTRATION; PERINEURAL
Status: COMPLETED | OUTPATIENT
Start: 2025-02-24 | End: 2025-02-24

## 2025-02-24 RX ADMIN — LIDOCAINE HYDROCHLORIDE 1 ML: 10 INJECTION, SOLUTION INFILTRATION; PERINEURAL at 11:45

## 2025-02-24 RX ADMIN — TRIAMCINOLONE ACETONIDE 40 MG: 40 INJECTION, SUSPENSION INTRA-ARTICULAR; INTRAMUSCULAR at 11:45

## 2025-02-24 NOTE — ASSESSMENT & PLAN NOTE
Patient was given bilateral wrist radiocarpal cortisone injections today with Kenalog.  He tolerated well.  He was advised that we can repeat the cortisone injections every 3 to 4 months as needed for pain  He will follow-up in 3 months for reevaluation

## 2025-02-24 NOTE — PROGRESS NOTES
ORTHOPAEDIC HAND, WRIST, AND ELBOW OFFICE  VISIT     Name: Vincent Clemons      : 1955      MRN: [id number]  Encounter Provider: Daniel Da Silva PA-C  Encounter Date: 2025   Encounter department: [facility]  :  Assessment & Plan  Slac (scapholunate advanced collapse) of wrist, left  Patient was given bilateral wrist radiocarpal cortisone injections today with Kenalog.  He tolerated well.  He was advised that we can repeat the cortisone injections every 3 to 4 months as needed for pain  He will follow-up in 3 months for reevaluation       Slac (scapholunate advanced collapse) of wrist, right  Patient was given bilateral wrist radiocarpal cortisone injections today with Kenalog.  He tolerated well.  He was advised that we can repeat the cortisone injections every 3 to 4 months as needed for pain  He will follow-up in 3 months for reevaluation               History of Present Illness   HPI  Chief Complaint   Patient presents with    Right Wrist - Follow-up     SLAC- Kenalog    Left Wrist - Follow-up     SLAC- Kenalog       Vincent Clemons is a 69 y.o. male who presents for follow-up regarding bilateral SLAC wrist.  He was previously given cortisone injections which lasted for about 2 months.  He states the pain started to return in his wrist as he has been working on things around the house.  He states he would like to try repeat cortisone injections for this issue.      REVIEW OF SYSTEMS:  General: no fever, no chills  HEENT:  No loss of hearing or eyesight problems  Eyes:  No red eyes  Respiratory:  No coughing, shortness of breath or wheezing  Cardiovascular:  No chest pain, no palpitations  GI:  Abdomen soft nontender, denies nausea  Endocrine:  No muscle weakness, no frequent urination, no excessive thirst  Urinary:  No dysuria, no incontinence  Musculoskeletal: see HPI and PE  SKIN:  No skin rash, no dry skin  Neurological:  No headaches, no  "confusion  Psychiatric:  No suicide thoughts, no anxiety, no depression  Review of all other systems is negative    Objective   Ht 6' 1\" (1.854 m)   Wt 79.1 kg (174 lb 6.4 oz)   BMI 23.01 kg/m²      General: well developed and well nourished, alert, oriented times 3, and appears comfortable  Psychiatric: Normal  HEENT: Trachea Midline, No torticollis  Cardiovascular: No discernable arrhythmia  Pulmonary: No wheezing or stridor  Abdomen: No rebound or guarding  Extremities: No peripheral edema  Skin: No masses, erythema, lacerations, fluctation, ulcerations  Neurovascular: Sensation Intact to the Median, Ulnar, Radial Nerve, Motor Intact to the Median, Ulnar, Radial Nerve, and Pulses Intact    Musculoskeletal exam:  Bilateral wrist  Mild swelling noted over the wrist at the radiocarpal joint  No erythema or ecchymosis noted  Tenderness to palpation over the radiocarpal joint  He has limited range of motion with wrist flexion and extension  Sensation is intact to light touch  Capillary refill less than 2 seconds      STUDIES REVIEWED:  No Studies to review      PROCEDURES PERFORMED:  Medium joint arthrocentesis: bilateral radiocarpal  Universal Protocol:  Consent: Verbal consent obtained.  Risks and benefits: risks, benefits and alternatives were discussed  Consent given by: patient  Patient understanding: patient states understanding of the procedure being performed  Patient consent: the patient's understanding of the procedure matches consent given  Site marked: the operative site was marked  Patient identity confirmed: verbally with patient  Supporting Documentation  Indications: pain   Procedure Details  Location: wrist - bilateral radiocarpal  Preparation: Patient was prepped and draped in the usual sterile fashion  Needle size: 25 G  Approach: dorsal    Medications (Right): 1 mL lidocaine 1 %; 40 mg triamcinolone acetonide 40 mg/mLMedications (Left): 1 mL lidocaine 1 %; 40 mg triamcinolone acetonide 40 mg/mL "   Patient tolerance: patient tolerated the procedure well with no immediate complications  Dressing:  Sterile dressing applied

## 2025-05-18 DIAGNOSIS — I10 ESSENTIAL HYPERTENSION: ICD-10-CM

## 2025-05-18 DIAGNOSIS — N52.9 ERECTILE DYSFUNCTION, UNSPECIFIED ERECTILE DYSFUNCTION TYPE: ICD-10-CM

## 2025-05-18 DIAGNOSIS — C61 PROSTATE CANCER (HCC): ICD-10-CM

## 2025-05-18 RX ORDER — AMLODIPINE BESYLATE 5 MG/1
5 TABLET ORAL DAILY
Qty: 30 TABLET | Refills: 5 | Status: SHIPPED | OUTPATIENT
Start: 2025-05-18

## 2025-05-19 RX ORDER — SILDENAFIL 100 MG/1
TABLET, FILM COATED ORAL
Qty: 30 TABLET | Refills: 3 | Status: SHIPPED | OUTPATIENT
Start: 2025-05-19

## 2025-06-09 ENCOUNTER — OFFICE VISIT (OUTPATIENT)
Dept: OBGYN CLINIC | Facility: CLINIC | Age: 70
End: 2025-06-09
Payer: MEDICARE

## 2025-06-09 VITALS — WEIGHT: 174 LBS | HEIGHT: 73 IN | BODY MASS INDEX: 23.06 KG/M2

## 2025-06-09 DIAGNOSIS — M19.132 SLAC (SCAPHOLUNATE ADVANCED COLLAPSE) OF WRIST, LEFT: ICD-10-CM

## 2025-06-09 DIAGNOSIS — M19.131 SLAC (SCAPHOLUNATE ADVANCED COLLAPSE) OF WRIST, RIGHT: Primary | ICD-10-CM

## 2025-06-09 PROCEDURE — 99213 OFFICE O/P EST LOW 20 MIN: CPT | Performed by: ORTHOPAEDIC SURGERY

## 2025-06-09 PROCEDURE — 20605 DRAIN/INJ JOINT/BURSA W/O US: CPT | Performed by: ORTHOPAEDIC SURGERY

## 2025-06-09 RX ORDER — TRIAMCINOLONE ACETONIDE 40 MG/ML
40 INJECTION, SUSPENSION INTRA-ARTICULAR; INTRAMUSCULAR
Status: COMPLETED | OUTPATIENT
Start: 2025-06-09 | End: 2025-06-09

## 2025-06-09 RX ORDER — LIDOCAINE HYDROCHLORIDE 10 MG/ML
1 INJECTION, SOLUTION INFILTRATION; PERINEURAL
Status: COMPLETED | OUTPATIENT
Start: 2025-06-09 | End: 2025-06-09

## 2025-06-09 RX ADMIN — LIDOCAINE HYDROCHLORIDE 1 ML: 10 INJECTION, SOLUTION INFILTRATION; PERINEURAL at 11:30

## 2025-06-09 RX ADMIN — TRIAMCINOLONE ACETONIDE 40 MG: 40 INJECTION, SUSPENSION INTRA-ARTICULAR; INTRAMUSCULAR at 11:30

## 2025-06-09 NOTE — ASSESSMENT & PLAN NOTE
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  Patient was offered, and accepted, Kenalog and lidocaine injection(s) to the right wrist for relief of pain and inflammation  Patient tolerated treatment(s) well  He will be seen in 3 to 4 months for re-evaluation and consideration for repeat injections as necessary  Patient expresses understanding and is in agreement with this treatment plan    Orders:  •  Medium joint arthrocentesis: bilateral radiocarpal  •  lidocaine (XYLOCAINE) 1 % injection 1 mL  •  lidocaine (XYLOCAINE) 1 % injection 1 mL  •  triamcinolone acetonide (Kenalog-40) 40 mg/mL injection 40 mg  •  triamcinolone acetonide (Kenalog-40) 40 mg/mL injection 40 mg

## 2025-06-09 NOTE — ASSESSMENT & PLAN NOTE
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  Patient was offered, and accepted, Kenalog and lidocaine injection(s) to the left wrist for relief of pain and inflammation  Patient tolerated treatment(s) well  He will be seen in 3 to 4 months for re-evaluation and consideration for repeat injections as necessary  Patient expresses understanding and is in agreement with this treatment plan  Orders:  •  Medium joint arthrocentesis: bilateral radiocarpal  •  lidocaine (XYLOCAINE) 1 % injection 1 mL  •  lidocaine (XYLOCAINE) 1 % injection 1 mL  •  triamcinolone acetonide (Kenalog-40) 40 mg/mL injection 40 mg  •  triamcinolone acetonide (Kenalog-40) 40 mg/mL injection 40 mg

## 2025-06-09 NOTE — PROGRESS NOTES
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  Patient was offered, and accepted, Kenalog and lidocaine injection(s) to the right wrist for relief of pain and inflammation  Patient tolerated treatment(s) well  He will be seen in 3 to 4 months for re-evaluation and consideration for repeat injections as necessary  Patient expresses understanding and is in agreement with this treatment plan    Orders:  •  Medium joint arthrocentesis: bilateral radiocarpal  •  lidocaine (XYLOCAINE) 1 % injection 1 mL  •  lidocaine (XYLOCAINE) 1 % injection 1 mL  •  triamcinolone acetonide (Kenalog-40) 40 mg/mL injection 40 mg  •  triamcinolone acetonide (Kenalog-40) 40 mg/mL injection 40 mg    Slac (scapholunate advanced collapse) of wrist, left  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  Patient was offered, and accepted, Kenalog and lidocaine injection(s) to the left wrist for relief of pain and inflammation  Patient tolerated treatment(s) well  He will be seen in 3 to 4 months for re-evaluation and consideration for repeat injections as necessary  Patient expresses understanding and is in agreement with this treatment plan  Orders:  •  Medium joint arthrocentesis: bilateral radiocarpal  •  lidocaine (XYLOCAINE) 1 % injection 1 mL  •  lidocaine (XYLOCAINE) 1 % injection 1 mL  •  triamcinolone acetonide (Kenalog-40) 40 mg/mL injection 40 mg  •  triamcinolone acetonide (Kenalog-40) 40 mg/mL injection 40  "mg      General Discussions:       Operative Discussions:       History of Present Illness   HPI  Chief Complaint   Patient presents with   • Left Wrist - Follow-up     SLAC - Kenalog    • Right Wrist - Follow-up     SLAC - Kenalog        Vincent Clemons is a 69 y.o. male who presents for follow-up evaluation regarding bilateral SLAC wrist. He was last seen in regards to this issue on 2/24/2025, at which time he received bilateral Kenalog injections. Patient states that he experienced significant relief following these injections. His symptoms began return approximately 3 to 4 weeks ago. On today's presentation he reports achy pain that he rates at 5-6/10. He denies any new onset bruising, swelling, numbness, or tingling. He desires repeat injections today.      REVIEW OF SYSTEMS:  General: no fever, no chills  HEENT:  No loss of hearing or eyesight problems  Eyes:  No red eyes  Respiratory:  No coughing, shortness of breath or wheezing  Cardiovascular:  No chest pain, no palpitations  GI:  Abdomen soft nontender, denies nausea  Endocrine:  No muscle weakness, no frequent urination, no excessive thirst  Urinary:  No dysuria, no incontinence  Musculoskeletal: see HPI and PE  SKIN:  No skin rash, no dry skin  Neurological:  No headaches, no confusion  Psychiatric:  No suicide thoughts, no anxiety, no depression  Review of all other systems is negative    Objective   Ht 6' 1\" (1.854 m)   Wt 78.9 kg (174 lb)   BMI 22.96 kg/m²      General: well developed and well nourished, alert, oriented times 3, and appears comfortable  Psychiatric: Normal  HEENT: Trachea Midline, No torticollis  Cardiovascular: No discernable arrhythmia  Pulmonary: No wheezing or stridor  Abdomen: No rebound or guarding  Extremities: No peripheral edema  Skin: No masses, erythema, lacerations, fluctation, ulcerations  Neurovascular: Sensation Intact to the Median, Ulnar, Radial Nerve, Motor Intact to the Median, Ulnar, Radial Nerve, and Pulses " Intact    Musculoskeletal exam:  Bilateral wrists -   Patient presents with bilateral SLAC wrist deformity  Skin is warm dry to touch no signs of erythema, ecchymosis, infection  Mild generalized soft tissue swelling noted, no effusion  TTP over radiocarpal joint  Demonstrates limited range of motion with wrist flexion and extension  2+ distal radial pulse of brisk cap refill to the fingers  Radial, median, and ulnar motor and sensory distributions intact  Sensation to light touch intact distally    STUDIES REVIEWED:  No Studies to review      PROCEDURES PERFORMED:  Medium joint arthrocentesis: bilateral radiocarpal    Performed by: Bryson Esparza MD  Authorized by: Bryson Esparza MD    Universal Protocol:  procedure performed by consultantConsent: Verbal consent obtained  Risks and benefits: risks, benefits and alternatives were discussed  Consent given by: patient  Timeout called at: 6/9/2025 11:55 AM.  Patient understanding: patient states understanding of the procedure being performed  Site marked: the operative site was marked  Radiology Images displayed and confirmed. If images not available, report reviewed: imaging studies available  Patient identity confirmed: verbally with patient  Supporting Documentation  Indications: pain   Procedure Details  Location: wrist - bilateral radiocarpal  Needle size: 25 G  Ultrasound guidance: no    Medications (Right): 1 mL lidocaine 1 %; 40 mg triamcinolone acetonide 40 mg/mLMedications (Left): 1 mL lidocaine 1 %; 40 mg triamcinolone acetonide 40 mg/mL   Patient tolerance: patient tolerated the procedure well with no immediate complications  Dressing:  Sterile dressing applied            Scribe Attestation    I,:  Max Mayes am acting as a scribe while in the presence of the attending physician.:       I,:  Bryson Esparza MD personally performed the services described in this documentation    as scribed in my presence.:

## 2025-08-06 ENCOUNTER — TELEPHONE (OUTPATIENT)
Dept: OBGYN CLINIC | Facility: CLINIC | Age: 70
End: 2025-08-06

## (undated) DEVICE — SPACEOAR SYSTEMS: Brand: SPACEOAR VUE™ SYSTEM - 10ML